# Patient Record
Sex: FEMALE | Race: WHITE | NOT HISPANIC OR LATINO | Employment: OTHER | URBAN - METROPOLITAN AREA
[De-identification: names, ages, dates, MRNs, and addresses within clinical notes are randomized per-mention and may not be internally consistent; named-entity substitution may affect disease eponyms.]

---

## 2017-01-19 ENCOUNTER — HOSPITAL ENCOUNTER (OUTPATIENT)
Dept: RADIOLOGY | Facility: MEDICAL CENTER | Age: 72
End: 2017-01-19
Attending: NURSE PRACTITIONER
Payer: MEDICARE

## 2017-01-19 DIAGNOSIS — E55.9 UNSPECIFIED VITAMIN D DEFICIENCY: ICD-10-CM

## 2017-01-19 DIAGNOSIS — Z13.820 SCREENING FOR OSTEOPOROSIS: ICD-10-CM

## 2017-01-19 PROCEDURE — 77080 DXA BONE DENSITY AXIAL: CPT

## 2017-02-06 ENCOUNTER — HOSPITAL ENCOUNTER (OUTPATIENT)
Dept: RADIOLOGY | Facility: MEDICAL CENTER | Age: 72
End: 2017-02-06
Attending: NURSE PRACTITIONER
Payer: MEDICARE

## 2017-02-06 DIAGNOSIS — R92.8 ABNORMAL MAMMOGRAM OF RIGHT BREAST: ICD-10-CM

## 2017-02-06 PROCEDURE — G0204 DX MAMMO INCL CAD BI: HCPCS

## 2017-08-16 ENCOUNTER — HOSPITAL ENCOUNTER (OUTPATIENT)
Dept: RADIOLOGY | Facility: MEDICAL CENTER | Age: 72
End: 2017-08-16
Attending: NURSE PRACTITIONER
Payer: MEDICARE

## 2017-08-16 DIAGNOSIS — E04.1 NONTOXIC UNINODULAR GOITER: ICD-10-CM

## 2017-08-16 PROCEDURE — 76536 US EXAM OF HEAD AND NECK: CPT

## 2017-08-29 ENCOUNTER — HOSPITAL ENCOUNTER (OUTPATIENT)
Dept: RADIOLOGY | Facility: MEDICAL CENTER | Age: 72
End: 2017-08-29
Attending: NURSE PRACTITIONER
Payer: MEDICARE

## 2017-08-29 DIAGNOSIS — E04.1 THYROID NODULE: ICD-10-CM

## 2017-08-29 PROCEDURE — 10022 IR-FINE NEEDLE ASPIR W/GUIDE(FL): CPT

## 2017-08-29 PROCEDURE — 88112 CYTOPATH CELL ENHANCE TECH: CPT

## 2017-08-29 ASSESSMENT — PAIN SCALES - GENERAL: PAINLEVEL_OUTOF10: 0

## 2017-08-29 NOTE — PROGRESS NOTES
"Patient given [facility] \"Preventing the Spread of Infection\" Brochure upon being checked in.     US guided Bilateral thyroid nodule fine needle aspiration done by Dr. Cunningham; bilateral anterior aspect of neck access site; procedural RN: Raenan; 2 jars of cytolyt obtained and sent to pathology lab; pt tolerated the procedure well; pt hemodynamically stable pre/intra/post procedure; all questions and concerns answered prior to being d/c; patient provided with appropriate education for procedure; pt d/c home.     "

## 2018-08-17 ENCOUNTER — HOSPITAL ENCOUNTER (OUTPATIENT)
Dept: RADIOLOGY | Facility: MEDICAL CENTER | Age: 73
End: 2018-08-17
Attending: FAMILY MEDICINE
Payer: MEDICARE

## 2018-08-17 DIAGNOSIS — E04.9 NON-TOXIC NODULAR GOITER: ICD-10-CM

## 2018-08-17 PROCEDURE — 76536 US EXAM OF HEAD AND NECK: CPT

## 2018-09-13 ENCOUNTER — HOSPITAL ENCOUNTER (OUTPATIENT)
Dept: RADIOLOGY | Facility: MEDICAL CENTER | Age: 73
End: 2018-09-13
Attending: FAMILY MEDICINE
Payer: MEDICARE

## 2018-09-13 DIAGNOSIS — Z12.31 BREAST CANCER SCREENING BY MAMMOGRAM: ICD-10-CM

## 2018-09-13 PROCEDURE — 77067 SCR MAMMO BI INCL CAD: CPT

## 2018-12-18 DIAGNOSIS — Z01.810 PRE-OPERATIVE CARDIOVASCULAR EXAMINATION: ICD-10-CM

## 2018-12-18 DIAGNOSIS — Z01.812 PRE-OPERATIVE LABORATORY EXAMINATION: ICD-10-CM

## 2018-12-18 LAB
ANION GAP SERPL CALC-SCNC: 7 MMOL/L (ref 0–11.9)
BUN SERPL-MCNC: 14 MG/DL (ref 8–22)
CALCIUM SERPL-MCNC: 10.2 MG/DL (ref 8.5–10.5)
CHLORIDE SERPL-SCNC: 107 MMOL/L (ref 96–112)
CO2 SERPL-SCNC: 26 MMOL/L (ref 20–33)
CREAT SERPL-MCNC: 0.82 MG/DL (ref 0.5–1.4)
EKG IMPRESSION: NORMAL
ERYTHROCYTE [DISTWIDTH] IN BLOOD BY AUTOMATED COUNT: 41.5 FL (ref 35.9–50)
GLUCOSE SERPL-MCNC: 108 MG/DL (ref 65–99)
HCT VFR BLD AUTO: 41.2 % (ref 37–47)
HGB BLD-MCNC: 13.5 G/DL (ref 12–16)
MCH RBC QN AUTO: 29.1 PG (ref 27–33)
MCHC RBC AUTO-ENTMCNC: 32.8 G/DL (ref 33.6–35)
MCV RBC AUTO: 88.8 FL (ref 81.4–97.8)
PLATELET # BLD AUTO: 289 K/UL (ref 164–446)
PMV BLD AUTO: 10.1 FL (ref 9–12.9)
POTASSIUM SERPL-SCNC: 4.2 MMOL/L (ref 3.6–5.5)
RBC # BLD AUTO: 4.64 M/UL (ref 4.2–5.4)
SODIUM SERPL-SCNC: 140 MMOL/L (ref 135–145)
WBC # BLD AUTO: 3.9 K/UL (ref 4.8–10.8)

## 2018-12-18 PROCEDURE — 93010 ELECTROCARDIOGRAM REPORT: CPT | Performed by: INTERNAL MEDICINE

## 2018-12-18 PROCEDURE — 93005 ELECTROCARDIOGRAM TRACING: CPT

## 2018-12-18 PROCEDURE — 85027 COMPLETE CBC AUTOMATED: CPT

## 2018-12-18 PROCEDURE — 36415 COLL VENOUS BLD VENIPUNCTURE: CPT

## 2018-12-18 PROCEDURE — 80048 BASIC METABOLIC PNL TOTAL CA: CPT

## 2018-12-18 RX ORDER — FAMOTIDINE 20 MG
TABLET ORAL DAILY
COMMUNITY

## 2018-12-18 RX ORDER — CHLORAL HYDRATE 500 MG
1000 CAPSULE ORAL DAILY
COMMUNITY

## 2018-12-18 RX ORDER — FENOFIBRATE 160 MG/1
160 TABLET ORAL DAILY
COMMUNITY

## 2018-12-18 RX ORDER — LANOLIN ALCOHOL/MO/W.PET/CERES
1000 CREAM (GRAM) TOPICAL DAILY
COMMUNITY

## 2018-12-18 RX ORDER — VIT C/B6/B5/MAGNESIUM/HERB 173 50-5-6-5MG
CAPSULE ORAL DAILY
COMMUNITY

## 2018-12-26 ENCOUNTER — HOSPITAL ENCOUNTER (OUTPATIENT)
Facility: MEDICAL CENTER | Age: 73
End: 2018-12-26
Attending: SURGERY | Admitting: SURGERY
Payer: MEDICARE

## 2018-12-26 VITALS
WEIGHT: 203.48 LBS | BODY MASS INDEX: 33.9 KG/M2 | HEIGHT: 65 IN | TEMPERATURE: 97.4 F | OXYGEN SATURATION: 95 % | RESPIRATION RATE: 16 BRPM | HEART RATE: 72 BPM

## 2018-12-26 DIAGNOSIS — G89.18 POSTOPERATIVE PAIN: ICD-10-CM

## 2018-12-26 LAB — PATHOLOGY CONSULT NOTE: NORMAL

## 2018-12-26 PROCEDURE — 700101 HCHG RX REV CODE 250

## 2018-12-26 PROCEDURE — 160002 HCHG RECOVERY MINUTES (STAT): Performed by: SURGERY

## 2018-12-26 PROCEDURE — A9270 NON-COVERED ITEM OR SERVICE: HCPCS | Performed by: ANESTHESIOLOGY

## 2018-12-26 PROCEDURE — 160036 HCHG PACU - EA ADDL 30 MINS PHASE I: Performed by: SURGERY

## 2018-12-26 PROCEDURE — 700111 HCHG RX REV CODE 636 W/ 250 OVERRIDE (IP)

## 2018-12-26 PROCEDURE — 160028 HCHG SURGERY MINUTES - 1ST 30 MINS LEVEL 3: Performed by: SURGERY

## 2018-12-26 PROCEDURE — 160035 HCHG PACU - 1ST 60 MINS PHASE I: Performed by: SURGERY

## 2018-12-26 PROCEDURE — 501838 HCHG SUTURE GENERAL: Performed by: SURGERY

## 2018-12-26 PROCEDURE — 700111 HCHG RX REV CODE 636 W/ 250 OVERRIDE (IP): Performed by: ANESTHESIOLOGY

## 2018-12-26 PROCEDURE — 502594 HCHG SCISSOR HANDLE, HARMONIC ACE: Performed by: SURGERY

## 2018-12-26 PROCEDURE — 160025 RECOVERY II MINUTES (STATS): Performed by: SURGERY

## 2018-12-26 PROCEDURE — 700102 HCHG RX REV CODE 250 W/ 637 OVERRIDE(OP): Performed by: ANESTHESIOLOGY

## 2018-12-26 PROCEDURE — 160039 HCHG SURGERY MINUTES - EA ADDL 1 MIN LEVEL 3: Performed by: SURGERY

## 2018-12-26 PROCEDURE — 160046 HCHG PACU - 1ST 60 MINS PHASE II: Performed by: SURGERY

## 2018-12-26 PROCEDURE — 160048 HCHG OR STATISTICAL LEVEL 1-5: Performed by: SURGERY

## 2018-12-26 PROCEDURE — 500002 HCHG ADHESIVE, DERMABOND: Performed by: SURGERY

## 2018-12-26 PROCEDURE — 160009 HCHG ANES TIME/MIN: Performed by: SURGERY

## 2018-12-26 PROCEDURE — 88307 TISSUE EXAM BY PATHOLOGIST: CPT

## 2018-12-26 RX ORDER — DIPHENHYDRAMINE HYDROCHLORIDE 50 MG/ML
12.5 INJECTION INTRAMUSCULAR; INTRAVENOUS
Status: DISCONTINUED | OUTPATIENT
Start: 2018-12-26 | End: 2018-12-26 | Stop reason: HOSPADM

## 2018-12-26 RX ORDER — OXYCODONE HCL 5 MG/5 ML
5 SOLUTION, ORAL ORAL
Status: COMPLETED | OUTPATIENT
Start: 2018-12-26 | End: 2018-12-26

## 2018-12-26 RX ORDER — ONDANSETRON 4 MG/1
4 TABLET, ORALLY DISINTEGRATING ORAL EVERY 6 HOURS PRN
Qty: 10 TAB | Refills: 0 | Status: SHIPPED | OUTPATIENT
Start: 2018-12-26

## 2018-12-26 RX ORDER — SODIUM CHLORIDE, SODIUM LACTATE, POTASSIUM CHLORIDE, CALCIUM CHLORIDE 600; 310; 30; 20 MG/100ML; MG/100ML; MG/100ML; MG/100ML
INJECTION, SOLUTION INTRAVENOUS ONCE
Status: COMPLETED | OUTPATIENT
Start: 2018-12-26 | End: 2018-12-26

## 2018-12-26 RX ORDER — HALOPERIDOL 5 MG/ML
1 INJECTION INTRAMUSCULAR
Status: DISCONTINUED | OUTPATIENT
Start: 2018-12-26 | End: 2018-12-26 | Stop reason: HOSPADM

## 2018-12-26 RX ORDER — LIDOCAINE HYDROCHLORIDE 40 MG/ML
SOLUTION TOPICAL
Status: DISCONTINUED
Start: 2018-12-26 | End: 2018-12-26 | Stop reason: HOSPADM

## 2018-12-26 RX ORDER — HYDROCODONE BITARTRATE AND ACETAMINOPHEN 5; 325 MG/1; MG/1
1-2 TABLET ORAL EVERY 6 HOURS PRN
Qty: 20 TAB | Refills: 0 | Status: SHIPPED | OUTPATIENT
Start: 2018-12-26 | End: 2018-12-31

## 2018-12-26 RX ORDER — ONDANSETRON 2 MG/ML
4 INJECTION INTRAMUSCULAR; INTRAVENOUS
Status: DISCONTINUED | OUTPATIENT
Start: 2018-12-26 | End: 2018-12-26 | Stop reason: HOSPADM

## 2018-12-26 RX ORDER — SODIUM CHLORIDE, SODIUM LACTATE, POTASSIUM CHLORIDE, CALCIUM CHLORIDE 600; 310; 30; 20 MG/100ML; MG/100ML; MG/100ML; MG/100ML
INJECTION, SOLUTION INTRAVENOUS CONTINUOUS
Status: DISCONTINUED | OUTPATIENT
Start: 2018-12-26 | End: 2018-12-26 | Stop reason: HOSPADM

## 2018-12-26 RX ORDER — OXYCODONE HCL 5 MG/5 ML
10 SOLUTION, ORAL ORAL
Status: COMPLETED | OUTPATIENT
Start: 2018-12-26 | End: 2018-12-26

## 2018-12-26 RX ORDER — MAGNESIUM SULFATE HEPTAHYDRATE 500 MG/ML
INJECTION, SOLUTION INTRAMUSCULAR; INTRAVENOUS
Status: DISCONTINUED
Start: 2018-12-26 | End: 2018-12-26 | Stop reason: HOSPADM

## 2018-12-26 RX ORDER — BUPIVACAINE HYDROCHLORIDE AND EPINEPHRINE 5; 5 MG/ML; UG/ML
INJECTION, SOLUTION PERINEURAL
Status: DISCONTINUED | OUTPATIENT
Start: 2018-12-26 | End: 2018-12-26 | Stop reason: HOSPADM

## 2018-12-26 RX ADMIN — SODIUM CHLORIDE, SODIUM LACTATE, POTASSIUM CHLORIDE, CALCIUM CHLORIDE: 600; 310; 30; 20 INJECTION, SOLUTION INTRAVENOUS at 08:51

## 2018-12-26 RX ADMIN — OXYCODONE HYDROCHLORIDE 10 MG: 5 SOLUTION ORAL at 11:57

## 2018-12-26 ASSESSMENT — PAIN SCALES - GENERAL
PAINLEVEL_OUTOF10: 4
PAINLEVEL_OUTOF10: 6
PAINLEVEL_OUTOF10: 4
PAINLEVEL_OUTOF10: 5
PAINLEVEL_OUTOF10: 0
PAINLEVEL_OUTOF10: 0

## 2018-12-26 NOTE — OP REPORT
Operative Report    Date: 12/26/2018    Surgeon: Yobani Otero M.D.    Assistant: HÉCTOR Valerio    Anesthesiologist: Dr. Mariee    Pre-operative Diagnosis: left thyroid nodule     Post-operative Diagnosis: same     Procedure:   1. Left thyroid lobectomy  2. unilateral recurrent laryngeal nerve monitoring    Indications: This is a 73 year old female who presented to my office with a 5.5 cm left thyroid nodule. She also had smaller nodules on the right side, for which she elected observation. I discussed risks of surgery including bleeding, infection, hypoparathyroidism, and recurrent laryngeal nerve injury, and she agreed to proceed.       Findings: 5.5 cm left thyroid nodule that appears grossly benign. The left recurrent laryngeal nerve was intact by NIM stimulation. The  left superior and left inferior parathyroid glands were seen and preserved.     Procedure in detail: The patient was identified in the pre-operative holding area and brought to the operating room. Correct side and site were identified.  GETA was smoothly induced. The patient was prepped and draped in the usual sterile fashion.    With the patient in the supine position, the head of the bed slightly elevated, the neck slightly extended, and the patient intubated with a NIM endotracheal tube, the precordial electrodes were placed by the surgical assistant, who then monitored the recurrent laryngeal nerves bilaterally throughout the procedure.     The neck was prepared with betadiene and draped in the usual fashion. The neck was entered through a lower transverse cervical incision and carried down through the platysma. Subplatysmal flaps were dissected superiorly and inferiorly down to the sternal notch. The midline cervical fascia was incised down to the capsule of the thyroid. The strap muscles were mobilized off the left thyroid lobe, and the superior pole vessels progressively divided with the Ligasure. The thyroid veins were divided with  the Ligasure. The recurrent laryngeal nerve and both parathyroids were identified and protected. Branches of the inferior thyroid artery were divided on the capsule of the gland with the Ligasure. Smaller vessels were either divided with the Ligasure or, when near to the recurrent nerve, divided between clamps and suture ligated with 3-0 Vicryl suture as the gland was dissected free from the nerve and off the trachea. The gland was transected at the medial border of the right thyroid lobe and the left thyroid lobe and the isthmus were sent for permanent pathologic exam.     Good hemostasis was assured.     The integrity of the left recurrent laryngeal nerve was documented. Small pieces of Surgicel Fibrillar were placed in  the neck. The midline cervical fascia was reapproximated with running 3-0 Vicryl. Platysma was reapproximated with interrupted 3-0 Vicryl. The skin was closed with a running subcuticular Prolene, and dressed with Dermabond. The Prolene was removed when the Dermabond dried.     The patient was awakened from general anesthetic, and was taken to the recovery room in stable condition.    Sponge and needle counts were correct at the end of the case.     Specimen: left thyroid lobe and isthmus    EBL: 30 cc    Dispo: to PACU    Yobani Otero M.D.  [facility]  [phone]

## 2018-12-26 NOTE — DISCHARGE INSTRUCTIONS
ACTIVITY: Rest and take it easy for the first 24 hours.  A responsible adult is recommended to remain with you during that time.  It is normal to feel sleepy.  We encourage you to not do anything that requires balance, judgment or coordination.    MILD FLU-LIKE SYMPTOMS ARE NORMAL. YOU MAY EXPERIENCE GENERALIZED MUSCLE ACHES, THROAT IRRITATION, HEADACHE AND/OR SOME NAUSEA.    FOR 24 HOURS DO NOT:  Drive, operate machinery or run household appliances.  Drink beer or alcoholic beverages.   Make important decisions or sign legal documents.    SPECIAL INSTRUCTIONS: *Discharge instructions after thyroidectomy:     You had surgery called thyroidectomy. This means that part or all of your thyroid gland was removed. The main job of the thyroid gland is to make thyroid hormone. This hormone controls your body’s metabolism. This is the way your body creates and uses energy. Removing the thyroid gland removes your body’s source of thyroid hormone. So after the surgery, you will need to take thyroid hormone pills daily. This helps keep the level of thyroid hormone in your body steady. This sheet tells you more about how to care for yourself after surgery.     Recovering After Surgery:     Get plenty of rest.     Care for your incision as directed.     Avoid heavy lifting and strenuous activity for 2 weeks.     Walk a few times daily. But don’t push yourself too hard. Slowly increase your pace and distance, as you feel able.     Return to work when your doctor says it’s okay.     Pain Control:  Most patients do well after thyroid surgery. You may take ibuprofen or Tylenol as needed for pain. If you need stronger pain medication, call your doctor's office.         Keeping Your Doctor’s Appointments:     See your doctor for regular visits. These are needed to monitor your health.     Have routine blood tests done. These check the level of thyroid hormone in your body. This helps your doctor know whether to adjust the dosage of  your medication if needed.     Tell your doctor about any signs of further thyroid problems.     Signs that you may have too much thyroid hormone in your body include:                 Restlessness                 Rapid weight loss                 Sweating                 Signs that you may have too little thyroid hormone in your body include:                 Fatigue or sluggishness                 Puffy hands, face, or feet                 Hoarseness                 Muscle pain                 Slow pulse (less than 60 beats per minute)     When to Call Your Doctor:  Call your doctor right away if you have any of the following:  Fever above 101°F  Swelling or bleeding at the incision site  Choking  Trouble breathing  A sore throat that lasts longer than 7 days  Tingling or cramps in your hands, feet, or lips     Calcium Supplementation  After thyroidectomy, you may have low calcium levels. Symptoms of low calcium levels include tingling or numbness in the fingers or lips, or muscle spasms.  To prevent low calcium levels, you should take Citracal Max. The usual dosage is 2 tablets three times daily. You may buy this OTC. If you have symptoms of low calcium, take two extra tablets and call your doctor's office.      Your doctor may also prescribe Rocaltrol to help with your calcium levels. Take this as prescribed.     FOLLOW-UP:  · Please call my office at [phone] to make an appointment in 1 week     Office address:  [place] [address]     Yobani Otero M.D.  [facility]  [phone]**    DIET: To avoid nausea, slowly advance diet as tolerated, avoiding spicy or greasy foods for the first day.  Add more substantial food to your diet according to your physician's instructions.  Babies can be fed formula or breast milk as soon as they are hungry.  INCREASE FLUIDS AND FIBER TO AVOID CONSTIPATION.    SURGICAL DRESSING/BATHING: *shower ok no hot tubs tub baths or swimming until after follow up  appointment and ok per Dr Otero **    FOLLOW-UP APPOINTMENT:  A follow-up appointment should be arranged with your doctor in ***; call to schedule. Follow up as already scheduled     You should CALL YOUR PHYSICIAN if you develop:  Fever greater than 101 degrees F.  Pain not relieved by medication, or persistent nausea or vomiting.  Excessive bleeding (blood soaking through dressing) or unexpected drainage from the wound.  Extreme redness or swelling around the incision site, drainage of pus or foul smelling drainage.  Inability to urinate or empty your bladder within 8 hours.  Problems with breathing or chest pain.    You should call 911 if you develop problems with breathing or chest pain.  If you are unable to contact your doctor or surgical center, you should go to the nearest emergency room or urgent care center.  Physician's telephone #: *[phone]**    If any questions arise, call your doctor.  If your doctor is not available, please feel free to call the Surgical Center at [phone].  The Center is open Monday through Friday from 7AM to 7PM.  You can also call the [employer] HOTLINE open 24 hours/day, 7 days/week and speak to a nurse at [phone], or toll free at [phone].    A registered nurse may call you a few days after your surgery to see how you are doing after your procedure.    MEDICATIONS: Resume taking daily medication.  Take prescribed pain medication with food.  If no medication is prescribed, you may take non-aspirin pain medication if needed.  PAIN MEDICATION CAN BE VERY CONSTIPATING.  Take a stool softener or laxative such as senokot, pericolace, or milk of magnesia if needed.    Prescription given for *norco **.  Last pain medication given at *1200pm **.    If your physician has prescribed pain medication that includes Acetaminophen (Tylenol), do not take additional Acetaminophen (Tylenol) while taking the prescribed medication.    Depression / Suicide Risk    As you are  discharged from this [facility] facility, it is important to learn how to keep safe from harming yourself.    Recognize the warning signs:  · Abrupt changes in personality, positive or negative- including increase in energy   · Giving away possessions  · Change in eating patterns- significant weight changes-  positive or negative  · Change in sleeping patterns- unable to sleep or sleeping all the time   · Unwillingness or inability to communicate  · Depression  · Unusual sadness, discouragement and loneliness  · Talk of wanting to die  · Neglect of personal appearance   · Rebelliousness- reckless behavior  · Withdrawal from people/activities they love  · Confusion- inability to concentrate     If you or a loved one observes any of these behaviors or has concerns about self-harm, here's what you can do:  · Talk about it- your feelings and reasons for harming yourself  · Remove any means that you might use to hurt yourself (examples: pills, rope, extension cords, firearm)  · Get professional help from the community (Mental Health, Substance Abuse, psychological counseling)  · Do not be alone:Call your Safe Contact- someone whom you trust who will be there for you.  · Call your local CRISIS HOTLINE 527-8420 or 741-969-7770  · Call your local Children's Mobile Crisis Response Team [place] (187) 435-7893 or www.[employer]  · Call the toll free National Suicide Prevention Hotlines   · National Suicide Prevention Lifeline 027-407-GLVT (4469)  · National Hope Line Network 800-SUICIDE (596-7079)

## 2018-12-26 NOTE — DISCHARGE SUMMARY
Discharge instructions after thyroidectomy:    You had surgery called thyroidectomy. This means that part or all of your thyroid gland was removed. The main job of the thyroid gland is to make thyroid hormone. This hormone controls your body’s metabolism. This is the way your body creates and uses energy. Removing the thyroid gland removes your body’s source of thyroid hormone. So after the surgery, you will need to take thyroid hormone pills daily. This helps keep the level of thyroid hormone in your body steady. This sheet tells you more about how to care for yourself after surgery.    Recovering After Surgery:    Get plenty of rest.    Care for your incision as directed.    Avoid heavy lifting and strenuous activity for 2 weeks.    Walk a few times daily. But don’t push yourself too hard. Slowly increase your pace and distance, as you feel able.    Return to work when your doctor says it’s okay.    Pain Control:  Most patients do well after thyroid surgery. You may take ibuprofen or Tylenol as needed for pain. If you need stronger pain medication, call your doctor's office.       Keeping Your Doctor’s Appointments:    See your doctor for regular visits. These are needed to monitor your health.    Have routine blood tests done. These check the level of thyroid hormone in your body. This helps your doctor know whether to adjust the dosage of your medication if needed.    Tell your doctor about any signs of further thyroid problems.    Signs that you may have too much thyroid hormone in your body include:     Restlessness     Rapid weight loss     Sweating     Signs that you may have too little thyroid hormone in your body include:     Fatigue or sluggishness     Puffy hands, face, or feet     Hoarseness     Muscle pain     Slow pulse (less than 60 beats per minute)    When to Call Your Doctor:  Call your doctor right away if you have any of the following:  Fever above 101°F  Swelling or bleeding at the incision  site  Choking  Trouble breathing  A sore throat that lasts longer than 7 days  Tingling or cramps in your hands, feet, or lips    Calcium Supplementation  After thyroidectomy, you may have low calcium levels. Symptoms of low calcium levels include tingling or numbness in the fingers or lips, or muscle spasms.  To prevent low calcium levels, you should take Citracal Max. The usual dosage is 2 tablets three times daily. You may buy this OTC. If you have symptoms of low calcium, take two extra tablets and call your doctor's office.     Your doctor may also prescribe Rocaltrol to help with your calcium levels. Take this as prescribed.    FOLLOW-UP:  ? Please call my office at [phone] to make an appointment in 1 week    Office address:  [address] [place] [address]    Yobani Otero M.D.  [facility]  [phone]

## 2018-12-26 NOTE — OR NURSING
1130 to pacu report from dr diggs and Or Rn pt remains asleep with oral airway in place   1145 awake oral airway removed breathing even unlabored o2 decreased to 2 liters sats 95% site to anterior neck inc closed with dermabond well approx clean and dry no bleeding and without redness or swelling   cold pack to site   1200- medicated with oral and iv pain meds rating pain 5-6 on 10 scale   1230son at bedside   1245- still needing 02 at 2 liter sats 87% on roomair   1350- meets dc criteria   Reviewed all dc instructions with pt and pt son   1415 dc to home declines wheelchair ambulates out of unit with steady gait with all belongings accomp with son

## 2019-03-05 ENCOUNTER — APPOINTMENT (RX ONLY)
Dept: URBAN - METROPOLITAN AREA CLINIC 20 | Facility: CLINIC | Age: 74
Setting detail: DERMATOLOGY
End: 2019-03-05

## 2019-03-05 DIAGNOSIS — D18.0 HEMANGIOMA: ICD-10-CM

## 2019-03-05 DIAGNOSIS — L82.1 OTHER SEBORRHEIC KERATOSIS: ICD-10-CM

## 2019-03-05 DIAGNOSIS — Z71.89 OTHER SPECIFIED COUNSELING: ICD-10-CM

## 2019-03-05 DIAGNOSIS — L81.4 OTHER MELANIN HYPERPIGMENTATION: ICD-10-CM

## 2019-03-05 DIAGNOSIS — L82.0 INFLAMED SEBORRHEIC KERATOSIS: ICD-10-CM

## 2019-03-05 DIAGNOSIS — D22 MELANOCYTIC NEVI: ICD-10-CM

## 2019-03-05 PROBLEM — E05.90 THYROTOXICOSIS, UNSPECIFIED WITHOUT THYROTOXIC CRISIS OR STORM: Status: ACTIVE | Noted: 2019-03-05

## 2019-03-05 PROBLEM — D22.5 MELANOCYTIC NEVI OF TRUNK: Status: ACTIVE | Noted: 2019-03-05

## 2019-03-05 PROBLEM — D18.01 HEMANGIOMA OF SKIN AND SUBCUTANEOUS TISSUE: Status: ACTIVE | Noted: 2019-03-05

## 2019-03-05 PROBLEM — D22.62 MELANOCYTIC NEVI OF LEFT UPPER LIMB, INCLUDING SHOULDER: Status: ACTIVE | Noted: 2019-03-05

## 2019-03-05 PROBLEM — D48.5 NEOPLASM OF UNCERTAIN BEHAVIOR OF SKIN: Status: ACTIVE | Noted: 2019-03-05

## 2019-03-05 PROBLEM — D22.61 MELANOCYTIC NEVI OF RIGHT UPPER LIMB, INCLUDING SHOULDER: Status: ACTIVE | Noted: 2019-03-05

## 2019-03-05 PROCEDURE — 99203 OFFICE O/P NEW LOW 30 MIN: CPT | Mod: 25

## 2019-03-05 PROCEDURE — 11103 TANGNTL BX SKIN EA SEP/ADDL: CPT

## 2019-03-05 PROCEDURE — 11102 TANGNTL BX SKIN SINGLE LES: CPT

## 2019-03-05 PROCEDURE — ? BIOPSY BY SHAVE METHOD

## 2019-03-05 PROCEDURE — ? SUNSCREEN RECOMMENDATIONS

## 2019-03-05 PROCEDURE — ? COUNSELING

## 2019-03-05 ASSESSMENT — LOCATION ZONE DERM
LOCATION ZONE: HAND
LOCATION ZONE: ARM
LOCATION ZONE: FACE
LOCATION ZONE: TRUNK

## 2019-03-05 ASSESSMENT — LOCATION DETAILED DESCRIPTION DERM
LOCATION DETAILED: INFERIOR THORACIC SPINE
LOCATION DETAILED: RIGHT INFERIOR UPPER BACK
LOCATION DETAILED: LEFT RADIAL DORSAL HAND
LOCATION DETAILED: LEFT VENTRAL PROXIMAL FOREARM
LOCATION DETAILED: RIGHT MEDIAL UPPER BACK
LOCATION DETAILED: SUPERIOR THORACIC SPINE
LOCATION DETAILED: RIGHT INFERIOR MEDIAL FOREHEAD
LOCATION DETAILED: RIGHT VENTRAL DISTAL FOREARM
LOCATION DETAILED: RIGHT RADIAL DORSAL HAND
LOCATION DETAILED: LEFT PROXIMAL DORSAL FOREARM
LOCATION DETAILED: RIGHT PROXIMAL DORSAL FOREARM

## 2019-03-05 ASSESSMENT — LOCATION SIMPLE DESCRIPTION DERM
LOCATION SIMPLE: RIGHT HAND
LOCATION SIMPLE: RIGHT UPPER BACK
LOCATION SIMPLE: RIGHT FOREHEAD
LOCATION SIMPLE: LEFT FOREARM
LOCATION SIMPLE: LEFT HAND
LOCATION SIMPLE: RIGHT FOREARM
LOCATION SIMPLE: UPPER BACK

## 2019-03-05 NOTE — PROCEDURE: BIOPSY BY SHAVE METHOD
Detail Level: Detailed
Lab: [id number]
Consent: Written consent was obtained and risks were reviewed including but not limited to scarring, infection, bleeding, scabbing, incomplete removal, nerve damage and allergy to anesthesia.
Bill For Surgical Tray: no
Biopsy Method: Personna blade
Hemostasis: Drysol and Electrocautery
Additional Anesthesia Volume In Cc (Will Not Render If 0): 0
Wound Care: Bacitracin
Lab Facility: 
Render Post-Care Instructions In Note?: yes
Anesthesia Type: 1% lidocaine with 1:100,000 epinephrine and a 1:12 solution of 8.4% sodium bicarbonate
Billing Type: Third-Party Bill
Type Of Destruction Used: Curettage
Biopsy Type: H and E
Depth Of Biopsy: dermis
Notification Instructions: Patient will be notified of biopsy results. However, patient instructed to call the office if not contacted within 2 weeks.
Dressing: Band-Aid
Anesthesia Volume In Cc: 1
Post-Care Instructions: I reviewed with the patient in detail post-care instructions. Patient is to keep the biopsy site clean once daily and then apply petroleum and bandaid  until healed.

## 2019-03-05 NOTE — HPI: SKIN LESION
Is This A New Presentation, Or A Follow-Up?: Growth
What Type Of Note Output Would You Prefer (Optional)?: Standard Output
How Severe Is Your Skin Lesion?: moderate
Has Your Skin Lesion Been Treated?: not been treated
Which Family Member (Optional)?: Mother

## 2020-07-29 ENCOUNTER — APPOINTMENT (RX ONLY)
Dept: URBAN - METROPOLITAN AREA CLINIC 20 | Facility: CLINIC | Age: 75
Setting detail: DERMATOLOGY
End: 2020-07-29

## 2020-07-29 DIAGNOSIS — I83.9 ASYMPTOMATIC VARICOSE VEINS OF LOWER EXTREMITIES: ICD-10-CM

## 2020-07-29 DIAGNOSIS — L82.0 INFLAMED SEBORRHEIC KERATOSIS: ICD-10-CM

## 2020-07-29 DIAGNOSIS — D22 MELANOCYTIC NEVI: ICD-10-CM

## 2020-07-29 DIAGNOSIS — D18.0 HEMANGIOMA: ICD-10-CM

## 2020-07-29 DIAGNOSIS — Z71.89 OTHER SPECIFIED COUNSELING: ICD-10-CM

## 2020-07-29 DIAGNOSIS — L82.1 OTHER SEBORRHEIC KERATOSIS: ICD-10-CM

## 2020-07-29 DIAGNOSIS — L81.4 OTHER MELANIN HYPERPIGMENTATION: ICD-10-CM

## 2020-07-29 PROBLEM — D22.61 MELANOCYTIC NEVI OF RIGHT UPPER LIMB, INCLUDING SHOULDER: Status: ACTIVE | Noted: 2020-07-29

## 2020-07-29 PROBLEM — D18.01 HEMANGIOMA OF SKIN AND SUBCUTANEOUS TISSUE: Status: ACTIVE | Noted: 2020-07-29

## 2020-07-29 PROBLEM — D22.5 MELANOCYTIC NEVI OF TRUNK: Status: ACTIVE | Noted: 2020-07-29

## 2020-07-29 PROBLEM — I83.91 ASYMPTOMATIC VARICOSE VEINS OF RIGHT LOWER EXTREMITY: Status: ACTIVE | Noted: 2020-07-29

## 2020-07-29 PROBLEM — D22.62 MELANOCYTIC NEVI OF LEFT UPPER LIMB, INCLUDING SHOULDER: Status: ACTIVE | Noted: 2020-07-29

## 2020-07-29 PROCEDURE — ? COUNSELING

## 2020-07-29 PROCEDURE — ? SUNSCREEN RECOMMENDATIONS

## 2020-07-29 PROCEDURE — ? ADDITIONAL NOTES

## 2020-07-29 PROCEDURE — 99214 OFFICE O/P EST MOD 30 MIN: CPT

## 2020-07-29 ASSESSMENT — LOCATION DETAILED DESCRIPTION DERM
LOCATION DETAILED: RIGHT PROXIMAL DORSAL FOREARM
LOCATION DETAILED: LEFT PROXIMAL DORSAL FOREARM
LOCATION DETAILED: RIGHT CENTRAL MALAR CHEEK
LOCATION DETAILED: RIGHT RADIAL DORSAL HAND
LOCATION DETAILED: RIGHT INFERIOR MEDIAL FOREHEAD
LOCATION DETAILED: LEFT VENTRAL PROXIMAL FOREARM
LOCATION DETAILED: INFERIOR THORACIC SPINE
LOCATION DETAILED: SUPERIOR THORACIC SPINE
LOCATION DETAILED: LEFT INFERIOR CENTRAL MALAR CHEEK
LOCATION DETAILED: RIGHT MEDIAL UPPER BACK
LOCATION DETAILED: RIGHT VENTRAL DISTAL FOREARM
LOCATION DETAILED: RIGHT UPPER CUTANEOUS LIP
LOCATION DETAILED: RIGHT INFERIOR UPPER BACK
LOCATION DETAILED: LEFT RADIAL DORSAL HAND

## 2020-07-29 ASSESSMENT — LOCATION SIMPLE DESCRIPTION DERM
LOCATION SIMPLE: UPPER BACK
LOCATION SIMPLE: RIGHT UPPER BACK
LOCATION SIMPLE: RIGHT FOREARM
LOCATION SIMPLE: LEFT HAND
LOCATION SIMPLE: LEFT FOREARM
LOCATION SIMPLE: RIGHT FOREHEAD
LOCATION SIMPLE: RIGHT LIP
LOCATION SIMPLE: RIGHT CHEEK
LOCATION SIMPLE: RIGHT HAND
LOCATION SIMPLE: LEFT CHEEK

## 2020-07-29 ASSESSMENT — LOCATION ZONE DERM
LOCATION ZONE: LIP
LOCATION ZONE: ARM
LOCATION ZONE: HAND
LOCATION ZONE: FACE
LOCATION ZONE: TRUNK

## 2020-07-29 NOTE — PROCEDURE: COUNSELING
Detail Level: Detailed
Patient Specific Counseling (Will Not Stick From Patient To Patient): pt reports she had an excision in the spot many years ago and the dilated vessel has remained. Wants cosmetic treatment.
Detail Level: Zone

## 2020-07-29 NOTE — PROCEDURE: ADDITIONAL NOTES
Additional Notes: Provided patient with Hanane’s card to schedule cosmetic consultation for scar treatment
Detail Level: Detailed

## 2021-01-14 DIAGNOSIS — Z23 NEED FOR VACCINATION: ICD-10-CM

## 2021-07-15 ENCOUNTER — HOSPITAL ENCOUNTER (OUTPATIENT)
Dept: RADIOLOGY | Facility: MEDICAL CENTER | Age: 76
End: 2021-07-15
Attending: FAMILY MEDICINE
Payer: MEDICARE

## 2021-07-15 DIAGNOSIS — Z12.31 ENCOUNTER FOR MAMMOGRAM TO ESTABLISH BASELINE MAMMOGRAM: ICD-10-CM

## 2021-07-15 PROCEDURE — 77063 BREAST TOMOSYNTHESIS BI: CPT

## 2021-07-26 ENCOUNTER — APPOINTMENT (RX ONLY)
Dept: URBAN - METROPOLITAN AREA CLINIC 20 | Facility: CLINIC | Age: 76
Setting detail: DERMATOLOGY
End: 2021-07-26

## 2021-07-26 DIAGNOSIS — L82.1 OTHER SEBORRHEIC KERATOSIS: ICD-10-CM

## 2021-07-26 DIAGNOSIS — D22 MELANOCYTIC NEVI: ICD-10-CM

## 2021-07-26 DIAGNOSIS — D18.0 HEMANGIOMA: ICD-10-CM

## 2021-07-26 DIAGNOSIS — Z71.89 OTHER SPECIFIED COUNSELING: ICD-10-CM

## 2021-07-26 DIAGNOSIS — L81.4 OTHER MELANIN HYPERPIGMENTATION: ICD-10-CM

## 2021-07-26 PROBLEM — D22.62 MELANOCYTIC NEVI OF LEFT UPPER LIMB, INCLUDING SHOULDER: Status: ACTIVE | Noted: 2021-07-26

## 2021-07-26 PROBLEM — D22.72 MELANOCYTIC NEVI OF LEFT LOWER LIMB, INCLUDING HIP: Status: ACTIVE | Noted: 2021-07-26

## 2021-07-26 PROBLEM — D22.61 MELANOCYTIC NEVI OF RIGHT UPPER LIMB, INCLUDING SHOULDER: Status: ACTIVE | Noted: 2021-07-26

## 2021-07-26 PROBLEM — D22.39 MELANOCYTIC NEVI OF OTHER PARTS OF FACE: Status: ACTIVE | Noted: 2021-07-26

## 2021-07-26 PROBLEM — D22.5 MELANOCYTIC NEVI OF TRUNK: Status: ACTIVE | Noted: 2021-07-26

## 2021-07-26 PROBLEM — D22.71 MELANOCYTIC NEVI OF RIGHT LOWER LIMB, INCLUDING HIP: Status: ACTIVE | Noted: 2021-07-26

## 2021-07-26 PROBLEM — D18.01 HEMANGIOMA OF SKIN AND SUBCUTANEOUS TISSUE: Status: ACTIVE | Noted: 2021-07-26

## 2021-07-26 PROCEDURE — ? COUNSELING

## 2021-07-26 PROCEDURE — ? SUNSCREEN RECOMMENDATIONS

## 2021-07-26 PROCEDURE — 99213 OFFICE O/P EST LOW 20 MIN: CPT

## 2021-07-26 ASSESSMENT — LOCATION DETAILED DESCRIPTION DERM
LOCATION DETAILED: LEFT VENTRAL PROXIMAL FOREARM
LOCATION DETAILED: RIGHT SUPERIOR UPPER BACK
LOCATION DETAILED: RIGHT DISTAL POSTERIOR THIGH
LOCATION DETAILED: RIGHT INFERIOR CENTRAL MALAR CHEEK
LOCATION DETAILED: LEFT PROXIMAL POSTERIOR UPPER ARM
LOCATION DETAILED: RIGHT DISTAL POSTERIOR UPPER ARM
LOCATION DETAILED: RIGHT INFERIOR FOREHEAD
LOCATION DETAILED: RIGHT INFERIOR MEDIAL MIDBACK
LOCATION DETAILED: INFERIOR THORACIC SPINE
LOCATION DETAILED: LEFT LATERAL PROXIMAL PRETIBIAL REGION
LOCATION DETAILED: RIGHT VENTRAL PROXIMAL FOREARM
LOCATION DETAILED: RIGHT PROXIMAL PRETIBIAL REGION
LOCATION DETAILED: RIGHT INFERIOR MEDIAL UPPER BACK
LOCATION DETAILED: LEFT DISTAL POSTERIOR THIGH

## 2021-07-26 ASSESSMENT — LOCATION ZONE DERM
LOCATION ZONE: TRUNK
LOCATION ZONE: FACE
LOCATION ZONE: ARM
LOCATION ZONE: LEG

## 2021-07-26 ASSESSMENT — LOCATION SIMPLE DESCRIPTION DERM
LOCATION SIMPLE: RIGHT POSTERIOR UPPER ARM
LOCATION SIMPLE: LEFT POSTERIOR THIGH
LOCATION SIMPLE: LEFT PRETIBIAL REGION
LOCATION SIMPLE: RIGHT FOREARM
LOCATION SIMPLE: RIGHT PRETIBIAL REGION
LOCATION SIMPLE: RIGHT POSTERIOR THIGH
LOCATION SIMPLE: RIGHT FOREHEAD
LOCATION SIMPLE: RIGHT CHEEK
LOCATION SIMPLE: RIGHT UPPER BACK
LOCATION SIMPLE: RIGHT LOWER BACK
LOCATION SIMPLE: LEFT POSTERIOR UPPER ARM
LOCATION SIMPLE: LEFT FOREARM
LOCATION SIMPLE: UPPER BACK

## 2022-08-25 ENCOUNTER — HOSPITAL ENCOUNTER (OUTPATIENT)
Dept: RADIOLOGY | Facility: MEDICAL CENTER | Age: 77
End: 2022-08-25
Attending: FAMILY MEDICINE
Payer: MEDICARE

## 2022-08-25 DIAGNOSIS — Z12.31 VISIT FOR SCREENING MAMMOGRAM: ICD-10-CM

## 2022-08-25 PROCEDURE — 77063 BREAST TOMOSYNTHESIS BI: CPT

## 2022-11-07 ENCOUNTER — PATIENT MESSAGE (OUTPATIENT)
Dept: HEALTH INFORMATION MANAGEMENT | Facility: OTHER | Age: 77
End: 2022-11-07

## 2024-01-17 ENCOUNTER — HOSPITAL ENCOUNTER (OUTPATIENT)
Dept: RADIOLOGY | Facility: MEDICAL CENTER | Age: 79
End: 2024-01-17
Attending: FAMILY MEDICINE
Payer: MEDICARE

## 2024-01-17 DIAGNOSIS — M25.532 LEFT WRIST PAIN: ICD-10-CM

## 2024-01-17 PROCEDURE — 73130 X-RAY EXAM OF HAND: CPT | Mod: LT

## 2024-01-17 PROCEDURE — 73110 X-RAY EXAM OF WRIST: CPT | Mod: LT

## 2024-10-28 ENCOUNTER — HOSPITAL ENCOUNTER (OUTPATIENT)
Dept: RADIOLOGY | Facility: MEDICAL CENTER | Age: 79
End: 2024-10-28
Attending: FAMILY MEDICINE
Payer: MEDICARE

## 2024-10-28 DIAGNOSIS — Z12.31 VISIT FOR SCREENING MAMMOGRAM: ICD-10-CM

## 2024-10-28 PROCEDURE — 77067 SCR MAMMO BI INCL CAD: CPT

## 2025-06-23 ENCOUNTER — HOSPITAL ENCOUNTER (OUTPATIENT)
Dept: RADIOLOGY | Facility: MEDICAL CENTER | Age: 80
End: 2025-06-23
Attending: FAMILY MEDICINE
Payer: MEDICARE

## 2025-06-23 DIAGNOSIS — R05.2 SUBACUTE COUGH: ICD-10-CM

## 2025-06-23 PROCEDURE — 71046 X-RAY EXAM CHEST 2 VIEWS: CPT

## 2025-06-25 ENCOUNTER — APPOINTMENT (OUTPATIENT)
Dept: RADIOLOGY | Facility: MEDICAL CENTER | Age: 80
DRG: 374 | End: 2025-06-25
Attending: EMERGENCY MEDICINE
Payer: MEDICARE

## 2025-06-25 ENCOUNTER — HOSPITAL ENCOUNTER (INPATIENT)
Facility: MEDICAL CENTER | Age: 80
LOS: 5 days | End: 2025-06-30
Attending: EMERGENCY MEDICINE | Admitting: STUDENT IN AN ORGANIZED HEALTH CARE EDUCATION/TRAINING PROGRAM
Payer: MEDICARE

## 2025-06-25 DIAGNOSIS — R19.09 OTHER INTRA-ABDOMINAL AND PELVIC SWELLING, MASS AND LUMP: ICD-10-CM

## 2025-06-25 DIAGNOSIS — J91.0 MALIGNANT PLEURAL EFFUSION: ICD-10-CM

## 2025-06-25 DIAGNOSIS — R97.1 ELEVATED CANCER ANTIGEN 125 (CA 125): ICD-10-CM

## 2025-06-25 DIAGNOSIS — Z71.89 ACP (ADVANCE CARE PLANNING): ICD-10-CM

## 2025-06-25 DIAGNOSIS — J90 PLEURAL EFFUSION: ICD-10-CM

## 2025-06-25 DIAGNOSIS — I10 PRIMARY HYPERTENSION: ICD-10-CM

## 2025-06-25 DIAGNOSIS — J96.01 ACUTE RESPIRATORY FAILURE WITH HYPOXIA (HCC): Primary | ICD-10-CM

## 2025-06-25 DIAGNOSIS — R18.0 MALIGNANT ASCITES: ICD-10-CM

## 2025-06-25 DIAGNOSIS — C78.6 PERITONEAL CARCINOMATOSIS (HCC): ICD-10-CM

## 2025-06-25 PROBLEM — J18.9 PNEUMONIA DUE TO INFECTIOUS ORGANISM: Status: ACTIVE | Noted: 2025-06-25

## 2025-06-25 PROBLEM — G89.3 CANCER ASSOCIATED PAIN: Status: ACTIVE | Noted: 2025-06-25

## 2025-06-25 PROBLEM — I25.10 CAD (CORONARY ARTERY DISEASE): Status: ACTIVE | Noted: 2025-06-25

## 2025-06-25 PROBLEM — R60.0 BILATERAL LEG EDEMA: Status: ACTIVE | Noted: 2025-06-25

## 2025-06-25 LAB
ALBUMIN SERPL BCP-MCNC: 3.3 G/DL (ref 3.2–4.9)
ALBUMIN/GLOB SERPL: 0.9 G/DL
ALP SERPL-CCNC: 128 U/L (ref 30–99)
ALT SERPL-CCNC: 32 U/L (ref 2–50)
ANION GAP SERPL CALC-SCNC: 15 MMOL/L (ref 7–16)
APPEARANCE UR: CLEAR
AST SERPL-CCNC: 46 U/L (ref 12–45)
BASOPHILS # BLD AUTO: 0.4 % (ref 0–1.8)
BASOPHILS # BLD: 0.04 K/UL (ref 0–0.12)
BILIRUB SERPL-MCNC: 0.3 MG/DL (ref 0.1–1.5)
BILIRUB UR QL STRIP.AUTO: NEGATIVE
BUN SERPL-MCNC: 8 MG/DL (ref 8–22)
CALCIUM ALBUM COR SERPL-MCNC: 9.6 MG/DL (ref 8.5–10.5)
CALCIUM SERPL-MCNC: 9 MG/DL (ref 8.5–10.5)
CHLORIDE SERPL-SCNC: 97 MMOL/L (ref 96–112)
CO2 SERPL-SCNC: 21 MMOL/L (ref 20–33)
COLOR UR: YELLOW
CREAT SERPL-MCNC: 0.48 MG/DL (ref 0.5–1.4)
CRP SERPL HS-MCNC: 17.2 MG/DL (ref 0–0.75)
EKG IMPRESSION: NORMAL
EOSINOPHIL # BLD AUTO: 0.03 K/UL (ref 0–0.51)
EOSINOPHIL NFR BLD: 0.3 % (ref 0–6.9)
ERYTHROCYTE [DISTWIDTH] IN BLOOD BY AUTOMATED COUNT: 43.1 FL (ref 35.9–50)
ERYTHROCYTE [SEDIMENTATION RATE] IN BLOOD BY WESTERGREN METHOD: 45 MM/HOUR (ref 0–25)
FLUAV RNA SPEC QL NAA+PROBE: NEGATIVE
FLUBV RNA SPEC QL NAA+PROBE: NEGATIVE
GFR SERPLBLD CREATININE-BSD FMLA CKD-EPI: 96 ML/MIN/1.73 M 2
GLOBULIN SER CALC-MCNC: 3.5 G/DL (ref 1.9–3.5)
GLUCOSE SERPL-MCNC: 130 MG/DL (ref 65–99)
GLUCOSE UR STRIP.AUTO-MCNC: NEGATIVE MG/DL
HCT VFR BLD AUTO: 38.4 % (ref 37–47)
HGB BLD-MCNC: 12.4 G/DL (ref 12–16)
IMM GRANULOCYTES # BLD AUTO: 0.04 K/UL (ref 0–0.11)
IMM GRANULOCYTES NFR BLD AUTO: 0.4 % (ref 0–0.9)
INR PPP: 1.12 (ref 0.87–1.13)
KETONES UR STRIP.AUTO-MCNC: 40 MG/DL
LACTATE SERPL-SCNC: 1.7 MMOL/L (ref 0.5–2)
LEUKOCYTE ESTERASE UR QL STRIP.AUTO: NEGATIVE
LYMPHOCYTES # BLD AUTO: 0.76 K/UL (ref 1–4.8)
LYMPHOCYTES NFR BLD: 8 % (ref 22–41)
MCH RBC QN AUTO: 25.5 PG (ref 27–33)
MCHC RBC AUTO-ENTMCNC: 32.3 G/DL (ref 32.2–35.5)
MCV RBC AUTO: 78.9 FL (ref 81.4–97.8)
MICRO URNS: ABNORMAL
MONOCYTES # BLD AUTO: 0.75 K/UL (ref 0–0.85)
MONOCYTES NFR BLD AUTO: 7.9 % (ref 0–13.4)
NEUTROPHILS # BLD AUTO: 7.9 K/UL (ref 1.82–7.42)
NEUTROPHILS NFR BLD: 83 % (ref 44–72)
NITRITE UR QL STRIP.AUTO: NEGATIVE
NRBC # BLD AUTO: 0 K/UL
NRBC BLD-RTO: 0 /100 WBC (ref 0–0.2)
NT-PROBNP SERPL IA-MCNC: 284 PG/ML (ref 0–125)
PH UR STRIP.AUTO: 6 [PH] (ref 5–8)
PLATELET # BLD AUTO: 733 K/UL (ref 164–446)
PMV BLD AUTO: 9 FL (ref 9–12.9)
POTASSIUM SERPL-SCNC: 4.4 MMOL/L (ref 3.6–5.5)
PROCALCITONIN SERPL-MCNC: 0.18 NG/ML
PROT SERPL-MCNC: 6.8 G/DL (ref 6–8.2)
PROT UR QL STRIP: NEGATIVE MG/DL
PROTHROMBIN TIME: 14.5 SEC (ref 12–14.6)
RBC # BLD AUTO: 4.87 M/UL (ref 4.2–5.4)
RBC UR QL AUTO: NEGATIVE
RSV RNA SPEC QL NAA+PROBE: NEGATIVE
SARS-COV-2 RNA RESP QL NAA+PROBE: NEGATIVE
SODIUM SERPL-SCNC: 133 MMOL/L (ref 135–145)
SP GR UR STRIP.AUTO: >1.045
T4 FREE SERPL-MCNC: 1.34 NG/DL (ref 0.93–1.7)
TROPONIN T SERPL-MCNC: 12 NG/L (ref 6–19)
TSH SERPL-ACNC: 3.28 UIU/ML (ref 0.38–5.33)
UROBILINOGEN UR STRIP.AUTO-MCNC: 0.2 EU/DL
WBC # BLD AUTO: 9.5 K/UL (ref 4.8–10.8)

## 2025-06-25 PROCEDURE — 770004 HCHG ROOM/CARE - ONCOLOGY PRIVATE *

## 2025-06-25 PROCEDURE — 84439 ASSAY OF FREE THYROXINE: CPT

## 2025-06-25 PROCEDURE — 93005 ELECTROCARDIOGRAM TRACING: CPT | Mod: TC

## 2025-06-25 PROCEDURE — 84484 ASSAY OF TROPONIN QUANT: CPT

## 2025-06-25 PROCEDURE — 87040 BLOOD CULTURE FOR BACTERIA: CPT | Mod: 91

## 2025-06-25 PROCEDURE — 84443 ASSAY THYROID STIM HORMONE: CPT

## 2025-06-25 PROCEDURE — 84145 PROCALCITONIN (PCT): CPT

## 2025-06-25 PROCEDURE — 94760 N-INVAS EAR/PLS OXIMETRY 1: CPT

## 2025-06-25 PROCEDURE — 80053 COMPREHEN METABOLIC PANEL: CPT

## 2025-06-25 PROCEDURE — 87086 URINE CULTURE/COLONY COUNT: CPT

## 2025-06-25 PROCEDURE — 93005 ELECTROCARDIOGRAM TRACING: CPT | Mod: TC | Performed by: EMERGENCY MEDICINE

## 2025-06-25 PROCEDURE — A9270 NON-COVERED ITEM OR SERVICE: HCPCS | Performed by: STUDENT IN AN ORGANIZED HEALTH CARE EDUCATION/TRAINING PROGRAM

## 2025-06-25 PROCEDURE — 700102 HCHG RX REV CODE 250 W/ 637 OVERRIDE(OP): Performed by: STUDENT IN AN ORGANIZED HEALTH CARE EDUCATION/TRAINING PROGRAM

## 2025-06-25 PROCEDURE — 700111 HCHG RX REV CODE 636 W/ 250 OVERRIDE (IP): Mod: JZ | Performed by: STUDENT IN AN ORGANIZED HEALTH CARE EDUCATION/TRAINING PROGRAM

## 2025-06-25 PROCEDURE — 71275 CT ANGIOGRAPHY CHEST: CPT

## 2025-06-25 PROCEDURE — 83605 ASSAY OF LACTIC ACID: CPT

## 2025-06-25 PROCEDURE — 700117 HCHG RX CONTRAST REV CODE 255: Performed by: EMERGENCY MEDICINE

## 2025-06-25 PROCEDURE — 99285 EMERGENCY DEPT VISIT HI MDM: CPT

## 2025-06-25 PROCEDURE — 81003 URINALYSIS AUTO W/O SCOPE: CPT

## 2025-06-25 PROCEDURE — 83880 ASSAY OF NATRIURETIC PEPTIDE: CPT

## 2025-06-25 PROCEDURE — 0241U POC COV-2, FLU A/B, RSV BY PCR: CPT | Performed by: EMERGENCY MEDICINE

## 2025-06-25 PROCEDURE — 85652 RBC SED RATE AUTOMATED: CPT

## 2025-06-25 PROCEDURE — 99223 1ST HOSP IP/OBS HIGH 75: CPT | Mod: 25,AI | Performed by: STUDENT IN AN ORGANIZED HEALTH CARE EDUCATION/TRAINING PROGRAM

## 2025-06-25 PROCEDURE — 86140 C-REACTIVE PROTEIN: CPT

## 2025-06-25 PROCEDURE — 71045 X-RAY EXAM CHEST 1 VIEW: CPT

## 2025-06-25 PROCEDURE — 85610 PROTHROMBIN TIME: CPT

## 2025-06-25 PROCEDURE — 700105 HCHG RX REV CODE 258: Performed by: STUDENT IN AN ORGANIZED HEALTH CARE EDUCATION/TRAINING PROGRAM

## 2025-06-25 PROCEDURE — 85025 COMPLETE CBC W/AUTO DIFF WBC: CPT

## 2025-06-25 PROCEDURE — 94660 CPAP INITIATION&MGMT: CPT

## 2025-06-25 PROCEDURE — 74177 CT ABD & PELVIS W/CONTRAST: CPT

## 2025-06-25 PROCEDURE — 36415 COLL VENOUS BLD VENIPUNCTURE: CPT

## 2025-06-25 PROCEDURE — 99497 ADVNCD CARE PLAN 30 MIN: CPT | Performed by: STUDENT IN AN ORGANIZED HEALTH CARE EDUCATION/TRAINING PROGRAM

## 2025-06-25 RX ORDER — ALBUMIN (HUMAN) 12.5 G/50ML
50 SOLUTION INTRAVENOUS ONCE
Status: DISPENSED | OUTPATIENT
Start: 2025-06-26 | End: 2025-06-27

## 2025-06-25 RX ORDER — ONDANSETRON 2 MG/ML
4 INJECTION INTRAMUSCULAR; INTRAVENOUS EVERY 4 HOURS PRN
Status: DISCONTINUED | OUTPATIENT
Start: 2025-06-25 | End: 2025-06-30 | Stop reason: HOSPADM

## 2025-06-25 RX ORDER — MONTELUKAST SODIUM 10 MG/1
10 TABLET ORAL
COMMUNITY
Start: 2025-05-06

## 2025-06-25 RX ORDER — ACETAMINOPHEN 325 MG/1
650 TABLET ORAL EVERY 6 HOURS PRN
Status: DISCONTINUED | OUTPATIENT
Start: 2025-06-25 | End: 2025-06-26

## 2025-06-25 RX ORDER — GUAIFENESIN/DEXTROMETHORPHAN 100-10MG/5
10 SYRUP ORAL EVERY 6 HOURS PRN
Status: DISCONTINUED | OUTPATIENT
Start: 2025-06-25 | End: 2025-06-30 | Stop reason: HOSPADM

## 2025-06-25 RX ORDER — MECLIZINE HYDROCHLORIDE 25 MG/1
25 TABLET ORAL 2 TIMES DAILY PRN
Status: ON HOLD | COMMUNITY
End: 2025-06-30

## 2025-06-25 RX ORDER — OXYCODONE HYDROCHLORIDE 10 MG/1
10 TABLET ORAL
Refills: 0 | Status: DISCONTINUED | OUTPATIENT
Start: 2025-06-25 | End: 2025-06-30 | Stop reason: HOSPADM

## 2025-06-25 RX ORDER — POLYETHYLENE GLYCOL 3350 17 G/17G
1 POWDER, FOR SOLUTION ORAL
Status: DISCONTINUED | OUTPATIENT
Start: 2025-06-25 | End: 2025-06-30 | Stop reason: HOSPADM

## 2025-06-25 RX ORDER — EZETIMIBE 10 MG/1
10 TABLET ORAL DAILY
Status: DISCONTINUED | OUTPATIENT
Start: 2025-06-26 | End: 2025-06-30 | Stop reason: HOSPADM

## 2025-06-25 RX ORDER — ACETAMINOPHEN 325 MG/1
650 TABLET ORAL EVERY 6 HOURS
Status: DISCONTINUED | OUTPATIENT
Start: 2025-06-25 | End: 2025-06-26

## 2025-06-25 RX ORDER — HYDROMORPHONE HYDROCHLORIDE 1 MG/ML
0.5 INJECTION, SOLUTION INTRAMUSCULAR; INTRAVENOUS; SUBCUTANEOUS
Status: DISCONTINUED | OUTPATIENT
Start: 2025-06-25 | End: 2025-06-30 | Stop reason: HOSPADM

## 2025-06-25 RX ORDER — LORAZEPAM 1 MG/1
0.5 TABLET ORAL EVERY 4 HOURS PRN
Status: DISCONTINUED | OUTPATIENT
Start: 2025-06-25 | End: 2025-06-30 | Stop reason: HOSPADM

## 2025-06-25 RX ORDER — AMLODIPINE BESYLATE 5 MG/1
5 TABLET ORAL DAILY
COMMUNITY

## 2025-06-25 RX ORDER — AMOXICILLIN 250 MG
2 CAPSULE ORAL EVERY EVENING
Status: DISCONTINUED | OUTPATIENT
Start: 2025-06-25 | End: 2025-06-30 | Stop reason: HOSPADM

## 2025-06-25 RX ORDER — SODIUM CHLORIDE, SODIUM LACTATE, POTASSIUM CHLORIDE, AND CALCIUM CHLORIDE .6; .31; .03; .02 G/100ML; G/100ML; G/100ML; G/100ML
500 INJECTION, SOLUTION INTRAVENOUS
Status: DISCONTINUED | OUTPATIENT
Start: 2025-06-25 | End: 2025-06-26

## 2025-06-25 RX ORDER — OXYCODONE HYDROCHLORIDE 5 MG/1
5 TABLET ORAL
Refills: 0 | Status: DISCONTINUED | OUTPATIENT
Start: 2025-06-25 | End: 2025-06-30 | Stop reason: HOSPADM

## 2025-06-25 RX ORDER — ROSUVASTATIN CALCIUM 20 MG/1
20 TABLET, COATED ORAL EVERY EVENING
COMMUNITY
Start: 2025-04-10

## 2025-06-25 RX ORDER — IPRATROPIUM BROMIDE AND ALBUTEROL SULFATE 2.5; .5 MG/3ML; MG/3ML
3 SOLUTION RESPIRATORY (INHALATION)
Status: DISCONTINUED | OUTPATIENT
Start: 2025-06-25 | End: 2025-06-30 | Stop reason: HOSPADM

## 2025-06-25 RX ORDER — ROSUVASTATIN CALCIUM 20 MG/1
20 TABLET, COATED ORAL EVERY EVENING
Status: DISCONTINUED | OUTPATIENT
Start: 2025-06-25 | End: 2025-06-30 | Stop reason: HOSPADM

## 2025-06-25 RX ORDER — ASPIRIN 81 MG/1
81 TABLET ORAL DAILY
Status: DISCONTINUED | OUTPATIENT
Start: 2025-06-26 | End: 2025-06-30 | Stop reason: HOSPADM

## 2025-06-25 RX ORDER — FUROSEMIDE 10 MG/ML
40 INJECTION INTRAMUSCULAR; INTRAVENOUS 2 TIMES DAILY
Status: COMPLETED | OUTPATIENT
Start: 2025-06-25 | End: 2025-06-27

## 2025-06-25 RX ORDER — AMLODIPINE BESYLATE 5 MG/1
5 TABLET ORAL DAILY
Status: DISCONTINUED | OUTPATIENT
Start: 2025-06-26 | End: 2025-06-30 | Stop reason: HOSPADM

## 2025-06-25 RX ORDER — LABETALOL HYDROCHLORIDE 5 MG/ML
10 INJECTION, SOLUTION INTRAVENOUS EVERY 4 HOURS PRN
Status: DISCONTINUED | OUTPATIENT
Start: 2025-06-25 | End: 2025-06-30 | Stop reason: HOSPADM

## 2025-06-25 RX ORDER — CEFDINIR 300 MG/1
300 CAPSULE ORAL 2 TIMES DAILY
Status: ON HOLD | COMMUNITY
Start: 2025-06-24 | End: 2025-06-30

## 2025-06-25 RX ORDER — EZETIMIBE 10 MG/1
10 TABLET ORAL DAILY
COMMUNITY

## 2025-06-25 RX ORDER — ALBUTEROL SULFATE 90 UG/1
2 INHALANT RESPIRATORY (INHALATION)
Status: DISCONTINUED | OUTPATIENT
Start: 2025-06-25 | End: 2025-06-30 | Stop reason: HOSPADM

## 2025-06-25 RX ORDER — METHOCARBAMOL 750 MG/1
750 TABLET, FILM COATED ORAL 3 TIMES DAILY
Status: COMPLETED | OUTPATIENT
Start: 2025-06-25 | End: 2025-06-28

## 2025-06-25 RX ORDER — ACETAMINOPHEN 325 MG/1
650 TABLET ORAL EVERY 6 HOURS PRN
Status: DISCONTINUED | OUTPATIENT
Start: 2025-06-30 | End: 2025-06-26

## 2025-06-25 RX ORDER — MONTELUKAST SODIUM 10 MG/1
10 TABLET ORAL
Status: DISCONTINUED | OUTPATIENT
Start: 2025-06-25 | End: 2025-06-30 | Stop reason: HOSPADM

## 2025-06-25 RX ORDER — ONDANSETRON 4 MG/1
4 TABLET, ORALLY DISINTEGRATING ORAL EVERY 4 HOURS PRN
Status: DISCONTINUED | OUTPATIENT
Start: 2025-06-25 | End: 2025-06-30 | Stop reason: HOSPADM

## 2025-06-25 RX ADMIN — ACETAMINOPHEN 650 MG: 325 TABLET ORAL at 19:51

## 2025-06-25 RX ADMIN — PIPERACILLIN AND TAZOBACTAM 3.38 G: 3; .375 INJECTION, POWDER, FOR SOLUTION INTRAVENOUS at 22:10

## 2025-06-25 RX ADMIN — ROSUVASTATIN CALCIUM 20 MG: 20 TABLET, FILM COATED ORAL at 19:51

## 2025-06-25 RX ADMIN — DOCUSATE SODIUM 50 MG AND SENNOSIDES 8.6 MG 2 TABLET: 8.6; 5 TABLET, FILM COATED ORAL at 19:51

## 2025-06-25 RX ADMIN — IOHEXOL 85 ML: 350 INJECTION, SOLUTION INTRAVENOUS at 17:15

## 2025-06-25 RX ADMIN — FUROSEMIDE 40 MG: 10 INJECTION, SOLUTION INTRAVENOUS at 19:52

## 2025-06-25 RX ADMIN — PIPERACILLIN AND TAZOBACTAM 3.38 G: 3; .375 INJECTION, POWDER, FOR SOLUTION INTRAVENOUS at 20:03

## 2025-06-25 RX ADMIN — METHOCARBAMOL 750 MG: 750 TABLET ORAL at 19:51

## 2025-06-25 RX ADMIN — MONTELUKAST 10 MG: 10 TABLET, FILM COATED ORAL at 19:51

## 2025-06-25 ASSESSMENT — ENCOUNTER SYMPTOMS
FOCAL WEAKNESS: 0
NAUSEA: 0
WEAKNESS: 1
DOUBLE VISION: 0
MYALGIAS: 1
BRUISES/BLEEDS EASILY: 0
HEARTBURN: 0
CHILLS: 1
HEADACHES: 0
SHORTNESS OF BREATH: 1
BLURRED VISION: 0
SPUTUM PRODUCTION: 1
COUGH: 1
FEVER: 0
DEPRESSION: 0
ABDOMINAL PAIN: 0
ORTHOPNEA: 1
VOMITING: 0
PALPITATIONS: 0
DIZZINESS: 0

## 2025-06-25 ASSESSMENT — PATIENT HEALTH QUESTIONNAIRE - PHQ9
2. FEELING DOWN, DEPRESSED, IRRITABLE, OR HOPELESS: NOT AT ALL
SUM OF ALL RESPONSES TO PHQ9 QUESTIONS 1 AND 2: 0
1. LITTLE INTEREST OR PLEASURE IN DOING THINGS: NOT AT ALL

## 2025-06-25 ASSESSMENT — LIFESTYLE VARIABLES: SUBSTANCE_ABUSE: 0

## 2025-06-25 ASSESSMENT — PULMONARY FUNCTION TESTS: EPAP_CMH2O: 5

## 2025-06-25 ASSESSMENT — PAIN DESCRIPTION - PAIN TYPE: TYPE: ACUTE PAIN

## 2025-06-25 NOTE — ED TRIAGE NOTES
"Chief Complaint   Patient presents with    Shortness of Breath     Pt reports recent diagnosis of \"fluid on lung\", taking ATB. Pt reports worsening SOB and cough.     Pt to triage via wheelchair for above complaint.     Pt is alert & oriented and follows commands. Pt speaking in full sentences and responds appropriately to questions. No acute distress noted in triage. Respirations are even and unlabored. Skin is pink/warm/dry.    Pt brought back to [facility]. Report to primary RN.  "

## 2025-06-25 NOTE — ED PROVIDER NOTES
"ER Provider Note    Scribed for Erasmo Johns M.D. by Charlene Jones. 6/25/2025   3:16 PM    Primary Care Provider: Kita Miner M.D.    CHIEF COMPLAINT  Chief Complaint   Patient presents with    Shortness of Breath     Pt reports recent diagnosis of \"fluid on lung\", taking ATB. Pt reports worsening SOB and cough.     EXTERNAL RECORDS REVIEWED  Outpatient Notes The patient has a history of CAD. She had a x-ray two days ago that showed new bilateral pleural effusions.     HPI/ROS  LIMITATION TO HISTORY   Select: : None  OUTSIDE HISTORIAN(S):  Family members are present at bedside and provide additional information regarding the patient's history.    Sheree Garcia is a 80 y.o. female who presents to the ED for evaluation of shortness of breath. Patient was told that she had fluid on her lungs two days ago and adds that she has been having a productive cough for the past 3 weeks, adding that she also has an ear infection. This morning her symptoms had worsened, adding that her heart feels like it is beating fast. She denies any fever, nausea or vomiting. The patient has been on three rounds of antibiotics, noting that it went from a 5 day course to a 10 day course to another 10 day course. She does not remember the names of the antibiotics, but family reports that the second course was different than the first. She reports that her shortness of breath is alleviated with laying down flat. The patient denies any chest pain, but reports that her ribs are sore from coughing. She also reports that her legs appeared swollen this morning along with her abdomen, adding that her abdomen also feels hard. Her last bowel movement was this morning, however she notes that it was a small amount. There were no alleviating factors reported. She states that she has not seen Dr. Coleman recently. The patient denies any history of blood clots in her lungs or her legs. Denies any recent syncope episodes. The patient has " "an allergy to Diphenhydramine.     PAST MEDICAL HISTORY  Past Medical History[1]  Coronary artery disease, dyslipidemia, palpitations, hypertension      SURGICAL HISTORY  Past Surgical History[2]    FAMILY HISTORY  Family History   Problem Relation Age of Onset    Cancer Maternal Aunt         breast     SOCIAL HISTORY   reports that she has never smoked. She has never used smokeless tobacco. She reports current alcohol use.    CURRENT MEDICATIONS  Previous Medications    ALBUTEROL 108 (90 BASE) MCG/ACT AERO SOLN INHALATION AEROSOL    albuterol sulfate HFA 90 mcg/actuation aerosol inhaler   Inhale 2 puffs every 6-8 hours by inhalation route.    AMOXICILLIN (AMOXIL) 500 MG CAP        ASPIRIN 81 MG EC TABLET    Take 81 mg by mouth every day.    AZITHROMYCIN (ZITHROMAX) 250 MG TAB    azithromycin 250 mg tablet    CYANOCOBALAMIN (VITAMIN B-12) 1000 MCG TAB    Take 1,000 mcg by mouth every day.    FENOFIBRATE (TRIGLIDE) 160 MG TABLET    Take 160 mg by mouth every day.    MISC NATURAL PRODUCTS (GINSENG COMPLEX PO)    Take  by mouth every day.    MULTIPLE VITAMINS-MINERALS (HAIR SKIN NAILS PO)    Take  by mouth.    NON FORMULARY REQUEST    aller yoel   bid    OMEGA-3 FATTY ACIDS (FISH OIL) 1000 MG CAP CAPSULE    Take 1,000 mg by mouth every day.    ONDANSETRON (ZOFRAN ODT) 4 MG TABLET DISPERSIBLE    Take 1 Tab by mouth every 6 hours as needed for Nausea.    TURMERIC 500 MG CAP    Take  by mouth every day.    VITAMIN D, CHOLECALCIFEROL, 1000 UNITS CAP    Take  by mouth every day.       ALLERGIES  Allergies[3]     PHYSICAL EXAM  BP (!) 145/86   Pulse (!) 129   Temp 36.1 °C (97 °F) (Temporal)   Resp (!) 27   Ht 1.626 m (5' 4\")   Wt 78 kg (172 lb)   SpO2 93%   BMI 29.52 kg/m²      Constitutional: Ill-appearing, Well nourished, No distress,    HENT: Normocephalic, Atraumatic, Dry mucous membranes.   Eyes: PERRLA, EOMI, Conjunctiva normal, No discharge.   Neck: No tenderness, Supple, No stridor.   Cardiovascular: " Tachycardic, Normal rhythm.   Thorax & Lungs: Decreased breath sounds bilaterally at bases, No respiratory distress.   Abdomen: Soft, No tenderness, No masses.   Skin: Warm, Dry, No rash.    Musculoskeletal: 2+ lower extremity edema. No major deformities noted.  Neurologic: Awake, alert. Moves all extremities spontaneously.  Psychiatric: Affect normal, Judgment normal, Mood normal.        DIAGNOSTIC STUDIES    Labs/EKG:   Results for orders placed or performed during the hospital encounter of 06/25/25   Lactic Acid    Collection Time: 06/25/25  3:14 PM   Result Value Ref Range    Lactic Acid 1.7 0.5 - 2.0 mmol/L   Blood Culture - Draw one from central line and one from peripheral site    Collection Time: 06/25/25  3:14 PM    Specimen: Line; Blood   Result Value Ref Range    Significant Indicator NEG     Source BLD     Site Peripheral     Culture Result       No Growth  Note: Blood cultures are incubated for 5 days and  are monitored continuously.Positive blood cultures  are called to the RN and reported as soon as  they are identified.     proBrain Natriuretic Peptide, NT    Collection Time: 06/25/25  3:14 PM   Result Value Ref Range    NT-proBNP 284 (H) 0 - 125 pg/mL   Troponin    Collection Time: 06/25/25  3:14 PM   Result Value Ref Range    Troponin T 12 6 - 19 ng/L   CBC WITH DIFFERENTIAL    Collection Time: 06/25/25  3:14 PM   Result Value Ref Range    WBC 9.5 4.8 - 10.8 K/uL    RBC 4.87 4.20 - 5.40 M/uL    Hemoglobin 12.4 12.0 - 16.0 g/dL    Hematocrit 38.4 37.0 - 47.0 %    MCV 78.9 (L) 81.4 - 97.8 fL    MCH 25.5 (L) 27.0 - 33.0 pg    MCHC 32.3 32.2 - 35.5 g/dL    RDW 43.1 35.9 - 50.0 fL    Platelet Count 733 (H) 164 - 446 K/uL    MPV 9.0 9.0 - 12.9 fL    Neutrophils-Polys 83.00 (H) 44.00 - 72.00 %    Lymphocytes 8.00 (L) 22.00 - 41.00 %    Monocytes 7.90 0.00 - 13.40 %    Eosinophils 0.30 0.00 - 6.90 %    Basophils 0.40 0.00 - 1.80 %    Immature Granulocytes 0.40 0.00 - 0.90 %    Nucleated RBC 0.00 0.00 -  0.20 /100 WBC    Neutrophils (Absolute) 7.90 (H) 1.82 - 7.42 K/uL    Lymphs (Absolute) 0.76 (L) 1.00 - 4.80 K/uL    Monos (Absolute) 0.75 0.00 - 0.85 K/uL    Eos (Absolute) 0.03 0.00 - 0.51 K/uL    Baso (Absolute) 0.04 0.00 - 0.12 K/uL    Immature Granulocytes (abs) 0.04 0.00 - 0.11 K/uL    NRBC (Absolute) 0.00 K/uL   Comp Metabolic Panel    Collection Time: 25  3:14 PM   Result Value Ref Range    Sodium 133 (L) 135 - 145 mmol/L    Potassium 4.4 3.6 - 5.5 mmol/L    Chloride 97 96 - 112 mmol/L    Co2 21 20 - 33 mmol/L    Anion Gap 15.0 7.0 - 16.0    Glucose 130 (H) 65 - 99 mg/dL    Bun 8 8 - 22 mg/dL    Creatinine 0.48 (L) 0.50 - 1.40 mg/dL    Calcium 9.0 8.5 - 10.5 mg/dL    Correct Calcium 9.6 8.5 - 10.5 mg/dL    AST(SGOT) 46 (H) 12 - 45 U/L    ALT(SGPT) 32 2 - 50 U/L    Alkaline Phosphatase 128 (H) 30 - 99 U/L    Total Bilirubin 0.3 0.1 - 1.5 mg/dL    Albumin 3.3 3.2 - 4.9 g/dL    Total Protein 6.8 6.0 - 8.2 g/dL    Globulin 3.5 1.9 - 3.5 g/dL    A-G Ratio 0.9 g/dL   FREE THYROXINE    Collection Time: 25  3:14 PM   Result Value Ref Range    Free T-4 1.34 0.93 - 1.70 ng/dL   TSH    Collection Time: 25  3:14 PM   Result Value Ref Range    TSH 3.280 0.380 - 5.330 uIU/mL   ESTIMATED GFR    Collection Time: 25  3:14 PM   Result Value Ref Range    GFR (CKD-EPI) 96 >60 mL/min/1.73 m 2   EKG    Collection Time: 25  3:22 PM   Result Value Ref Range    Report       [facility] Emergency Dept.    Test Date:  2025  Pt Name:    JUSTINO SUN           Department: ER  MRN:        [id number]                      Room:  Gender:     Female                       Technician: [id number]  :        [id number]                   Requested By:ER TRIAGE PROTOCOL  Order #:    [id number]                    Reading MD: MELISSA SILVERIO MD    Measurements  Intervals                                Axis  Rate:       136                          P:          23  LA:         126                           QRS:        65  QRSD:       78                           T:          60  QT:         296  QTc:        446    Interpretive Statements  Sinus tachycardia  Low voltage, precordial leads  Compared to ECG 12/18/2018 10:10:52  Low QRS voltage now present  Sinus bradycardia no longer present  Electronically Signed On 06- 15:22:56 PDT by MELISSA SILVERIO MD     Blood Culture - Draw one from central line and one from peripheral site    Collection Time: 06/25/25  3:25 PM    Specimen: Peripheral; Blood   Result Value Ref Range    Significant Indicator NEG     Source BLD     Site PERIPHERAL     Culture Result       No Growth  Note: Blood cultures are incubated for 5 days and  are monitored continuously.Positive blood cultures  are called to the RN and reported as soon as  they are identified.     POC CoV-2, FLU A/B, RSV by PCR    Collection Time: 06/25/25  4:04 PM   Result Value Ref Range    POC Influenza A RNA, PCR NEGATIVE NEGATIVE    POC Influenza B RNA, PCR NEGATIVE NEGATIVE    POC RSV, by PCR NEGATIVE NEGATIVE    POC SARS-CoV-2, PCR NEGATIVE NEGATIVE       Radiology:   This attending emergency physician has independently interpreted the diagnostic imaging associated with this visit and is awaiting the final reading from the radiologist.   Preliminary interpretation is a follows: Large pleural effusions with ascites  Radiologist interpretation:   CT-ABDOMEN-PELVIS WITH   Final Result      1.  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   2.  Extensive peritoneal carcinomatosis with large volume ascites.   3.  Partially visualized large bilateral pleural effusions. Malignant pleural effusions are possible.      CT-CTA CHEST PULMONARY ARTERY W/ RECONS   Final Result      1.  No CT evidence for pulmonary emboli.   2.  Large bilateral pleural effusions with associated atelectasis.   3.  Ascites with findings concerning for peritoneal tumor implants in the upper abdomen.                DX-CHEST-PORTABLE (1 VIEW)   Final Result      1.  Worsening pulmonary edema.   2.  Worsening bilateral pleural effusions, larger on the RIGHT.   3.  No pneumothorax.      US-THORACENTESIS PUNCTURE RIGHT    (Results Pending)   US-THORACENTESIS PUNCTURE LEFT    (Results Pending)   US-PARACENTESIS, ABD WITH IMAGING    (Results Pending)          COURSE & MEDICAL DECISION MAKING     Sepsis: Infection was suspected 4:00 PM  (Time). Sepsis pathway was initiated. Fluids not needed (no hypotension or lactate greater than or equal to 4). Antibiotics were given per protocol.    INITIAL ASSESSMENT, COURSE AND PLAN  Differential diagnoses include but not limited to: Pleural effusion, Cancer, Atelectasis, Liver failure.      Care Narrative: Patient is coming in with moderate severe respiratory distress, the patient is very tachypneic, decreased breath sounds throughout.  The patient is hypoxic.  Put the patient on oxygen.  Did the patient tachypnea and tachycardia elected to get a CT PE study that was negative but showed bilateral large pleural effusions, also elected to get a CT scan abdomen pelvis that shows a large ovarian mass with ascites.  Had the patient on BiPAP which improved her symptoms, updated the patient on her diagnosis, discussed case with the pulmonologist for possible thoracentesis.  Discussed the case with the nurse practitioner covering for Dr. Caldwell, they will consult on the case, discussed case with the hospitalist for hospitalization.    3:16 PM - Patient was evaluated at bedside. Ordered for CT-CTA Chest Pulmonary Artery w/ recons, CT-Abdomen-Pelvis with, DX-chest, TSH, Free Thyroxine, CBC with diff, CMP, Free Thyroxine, TSH, BNP, Troponin, Lactic acid, UA, Urine culture, Blood culture x2, EKG to evaluate. Patient verbalizes understanding and support with my plan of care.     3:58 PM - Patient was reevaluated at bedside. Discussed plan of care ,including ordering labs to further evaluate. Patient  verbalizes understanding and agreement to this plan of care. Ordered SARS-CoV-2, Flu A/B, and RSV.     CRITICAL CARE  The very real possibilty of a deterioration of this patient's condition required the highest level of my preparedness for sudden, emergent intervention.  I provided critical care services, which included medication orders, frequent reevaluations of the patient's condition and response to treatment, ordering and reviewing test results, and discussing the case with various consultants.  The critical care time associated with the care of the patient was 35 minutes. Review chart for interventions. This time is exclusive of any other billable procedures.       DISPOSITION AND DISCUSSIONS    I have discussed management of the patient with the following physicians and LORI's: Pulmonology, gynecology oncology, hospitalist    FINAL DIAGNOSIS  1. Acute respiratory failure with hypoxia (HCC)    2. Malignant ascites    3. Malignant pleural effusion         ICharlene (Scribe), am scribing for, and in the presence of, Erasmo Johns M.D..    Electronically signed by: Charlene Jones (Scribe), 6/25/2025    IErasmo M.D. personally performed the services described in this documentation, as scribed by Charlene Jones in my presence, and it is both accurate and complete.      The note accurately reflects work and decisions made by me.  Erasmo Johns M.D.  6/25/2025  6:14 PM         [1]   Past Medical History:  Diagnosis Date    Arthritis     CAD (coronary artery disease)     Cataract     IOL    Heart burn     Indigestion    [2]   Past Surgical History:  Procedure Laterality Date    THYROID LOBECTOMY Left 12/26/2018    Procedure: THYROID LOBECTOMY - AND ISTHMUSECTOMY;  Surgeon: Yobani Otero M.D.;  Location: SURGERY SAME DAY [facility];  Service: General    HYSTERECTOMY LAPAROSCOPY      OTHER      mass in breast    OTHER ORTHOPEDIC SURGERY      broken ankle   [3]   Allergies  Allergen  Reactions    Diphenhydramine Unspecified

## 2025-06-26 ENCOUNTER — APPOINTMENT (OUTPATIENT)
Dept: RADIOLOGY | Facility: MEDICAL CENTER | Age: 80
DRG: 374 | End: 2025-06-26
Attending: EMERGENCY MEDICINE
Payer: MEDICARE

## 2025-06-26 ENCOUNTER — APPOINTMENT (OUTPATIENT)
Dept: RADIOLOGY | Facility: MEDICAL CENTER | Age: 80
End: 2025-06-26
Attending: STUDENT IN AN ORGANIZED HEALTH CARE EDUCATION/TRAINING PROGRAM
Payer: MEDICARE

## 2025-06-26 LAB
ALBUMIN FLD-MCNC: 2.4 G/DL
ALBUMIN SERPL BCP-MCNC: 3 G/DL (ref 3.2–4.9)
ALBUMIN/GLOB SERPL: 0.9 G/DL
ALP SERPL-CCNC: 116 U/L (ref 30–99)
ALT SERPL-CCNC: 31 U/L (ref 2–50)
AMYLASE FLD-CCNC: 27 U/L
AMYLASE FLD-CCNC: 34 U/L
ANION GAP SERPL CALC-SCNC: 13 MMOL/L (ref 7–16)
APPEARANCE FLD: NORMAL
APPEARANCE FLD: NORMAL
AST SERPL-CCNC: 39 U/L (ref 12–45)
BASOPHILS # BLD AUTO: 0.4 % (ref 0–1.8)
BASOPHILS # BLD: 0.03 K/UL (ref 0–0.12)
BASOPHILS NFR FLD: 1 %
BILIRUB SERPL-MCNC: 0.3 MG/DL (ref 0.1–1.5)
BODY FLD TYPE: NORMAL
BUN SERPL-MCNC: 8 MG/DL (ref 8–22)
CALCIUM ALBUM COR SERPL-MCNC: 9.6 MG/DL (ref 8.5–10.5)
CALCIUM SERPL-MCNC: 8.8 MG/DL (ref 8.5–10.5)
CANCER AG125 SERPL-ACNC: 1392 U/ML (ref 0–35)
CELLS FLD: 8
CHLORIDE SERPL-SCNC: 98 MMOL/L (ref 96–112)
CO2 SERPL-SCNC: 24 MMOL/L (ref 20–33)
COLOR FLD: NORMAL
COLOR FLD: NORMAL
CREAT SERPL-MCNC: 0.54 MG/DL (ref 0.5–1.4)
EOSINOPHIL # BLD AUTO: 0.05 K/UL (ref 0–0.51)
EOSINOPHIL NFR BLD: 0.7 % (ref 0–6.9)
ERYTHROCYTE [DISTWIDTH] IN BLOOD BY AUTOMATED COUNT: 44.5 FL (ref 35.9–50)
GFR SERPLBLD CREATININE-BSD FMLA CKD-EPI: 93 ML/MIN/1.73 M 2
GLOBULIN SER CALC-MCNC: 3.4 G/DL (ref 1.9–3.5)
GLUCOSE FLD-MCNC: 103 MG/DL
GLUCOSE FLD-MCNC: 84 MG/DL
GLUCOSE SERPL-MCNC: 104 MG/DL (ref 65–99)
GRAM STN SPEC: NORMAL
GRAM STN SPEC: NORMAL
HCT VFR BLD AUTO: 38.1 % (ref 37–47)
HGB BLD-MCNC: 11.8 G/DL (ref 12–16)
IMM GRANULOCYTES # BLD AUTO: 0.02 K/UL (ref 0–0.11)
IMM GRANULOCYTES NFR BLD AUTO: 0.3 % (ref 0–0.9)
LDH FLD L TO P-CCNC: 218 U/L
LDH SERPL L TO P-CCNC: 179 U/L (ref 107–266)
LYMPHOCYTES # BLD AUTO: 0.72 K/UL (ref 1–4.8)
LYMPHOCYTES NFR BLD: 9.9 % (ref 22–41)
LYMPHOCYTES NFR FLD: 74 %
LYMPHOCYTES NFR FLD: 77 %
MAGNESIUM SERPL-MCNC: 2 MG/DL (ref 1.5–2.5)
MCH RBC QN AUTO: 25 PG (ref 27–33)
MCHC RBC AUTO-ENTMCNC: 31 G/DL (ref 32.2–35.5)
MCV RBC AUTO: 80.7 FL (ref 81.4–97.8)
MONOCYTES # BLD AUTO: 0.68 K/UL (ref 0–0.85)
MONOCYTES NFR BLD AUTO: 9.3 % (ref 0–13.4)
MONOS+MACROS NFR FLD MANUAL: 15 %
MONOS+MACROS NFR FLD MANUAL: 6 %
NEUTROPHILS # BLD AUTO: 5.79 K/UL (ref 1.82–7.42)
NEUTROPHILS NFR BLD: 79.4 % (ref 44–72)
NEUTROPHILS NFR FLD: 16 %
NEUTROPHILS NFR FLD: 3 %
NRBC # BLD AUTO: 0 K/UL
NRBC BLD-RTO: 0 /100 WBC (ref 0–0.2)
NUC CELL # FLD: 1084 CELLS/UL
NUC CELL # FLD: 1612 CELLS/UL
PH FLD: 8 [PH]
PHOSPHATE SERPL-MCNC: 4.3 MG/DL (ref 2.5–4.5)
PLATELET # BLD AUTO: 594 K/UL (ref 164–446)
PMV BLD AUTO: 8.9 FL (ref 9–12.9)
POTASSIUM SERPL-SCNC: 4.3 MMOL/L (ref 3.6–5.5)
PROT FLD-MCNC: 3.9 G/DL
PROT SERPL-MCNC: 6.4 G/DL (ref 6–8.2)
RBC # BLD AUTO: 4.72 M/UL (ref 4.2–5.4)
RBC # FLD: NORMAL CELLS/UL
RBC # FLD: NORMAL CELLS/UL
SIGNIFICANT IND 70042: NORMAL
SIGNIFICANT IND 70042: NORMAL
SITE SITE: NORMAL
SITE SITE: NORMAL
SODIUM SERPL-SCNC: 135 MMOL/L (ref 135–145)
SOURCE SOURCE: NORMAL
SOURCE SOURCE: NORMAL
WBC # BLD AUTO: 7.3 K/UL (ref 4.8–10.8)

## 2025-06-26 PROCEDURE — 88112 CYTOPATH CELL ENHANCE TECH: CPT | Performed by: PATHOLOGY

## 2025-06-26 PROCEDURE — 83615 LACTATE (LD) (LDH) ENZYME: CPT | Mod: 91

## 2025-06-26 PROCEDURE — 36415 COLL VENOUS BLD VENIPUNCTURE: CPT

## 2025-06-26 PROCEDURE — C1729 CATH, DRAINAGE: HCPCS

## 2025-06-26 PROCEDURE — 83986 ASSAY PH BODY FLUID NOS: CPT

## 2025-06-26 PROCEDURE — 88305 TISSUE EXAM BY PATHOLOGIST: CPT | Mod: 26 | Performed by: PATHOLOGY

## 2025-06-26 PROCEDURE — 93970 EXTREMITY STUDY: CPT

## 2025-06-26 PROCEDURE — 87116 MYCOBACTERIA CULTURE: CPT

## 2025-06-26 PROCEDURE — 89051 BODY FLUID CELL COUNT: CPT

## 2025-06-26 PROCEDURE — 0W993ZZ DRAINAGE OF RIGHT PLEURAL CAVITY, PERCUTANEOUS APPROACH: ICD-10-PCS | Performed by: NURSE PRACTITIONER

## 2025-06-26 PROCEDURE — 85025 COMPLETE CBC W/AUTO DIFF WBC: CPT

## 2025-06-26 PROCEDURE — 770004 HCHG ROOM/CARE - ONCOLOGY PRIVATE *

## 2025-06-26 PROCEDURE — 700111 HCHG RX REV CODE 636 W/ 250 OVERRIDE (IP): Mod: JZ | Performed by: STUDENT IN AN ORGANIZED HEALTH CARE EDUCATION/TRAINING PROGRAM

## 2025-06-26 PROCEDURE — 0W9G3ZZ DRAINAGE OF PERITONEAL CAVITY, PERCUTANEOUS APPROACH: ICD-10-PCS | Performed by: NURSE PRACTITIONER

## 2025-06-26 PROCEDURE — 83735 ASSAY OF MAGNESIUM: CPT

## 2025-06-26 PROCEDURE — 71045 X-RAY EXAM CHEST 1 VIEW: CPT

## 2025-06-26 PROCEDURE — 87102 FUNGUS ISOLATION CULTURE: CPT

## 2025-06-26 PROCEDURE — 82247 BILIRUBIN TOTAL: CPT

## 2025-06-26 PROCEDURE — 82042 OTHER SOURCE ALBUMIN QUAN EA: CPT

## 2025-06-26 PROCEDURE — 82945 GLUCOSE OTHER FLUID: CPT | Mod: 91

## 2025-06-26 PROCEDURE — 87205 SMEAR GRAM STAIN: CPT

## 2025-06-26 PROCEDURE — 87070 CULTURE OTHR SPECIMN AEROBIC: CPT

## 2025-06-26 PROCEDURE — 700102 HCHG RX REV CODE 250 W/ 637 OVERRIDE(OP): Performed by: STUDENT IN AN ORGANIZED HEALTH CARE EDUCATION/TRAINING PROGRAM

## 2025-06-26 PROCEDURE — 99233 SBSQ HOSP IP/OBS HIGH 50: CPT | Mod: 25 | Performed by: STUDENT IN AN ORGANIZED HEALTH CARE EDUCATION/TRAINING PROGRAM

## 2025-06-26 PROCEDURE — 84157 ASSAY OF PROTEIN OTHER: CPT

## 2025-06-26 PROCEDURE — 86304 IMMUNOASSAY TUMOR CA 125: CPT

## 2025-06-26 PROCEDURE — 700105 HCHG RX REV CODE 258: Performed by: STUDENT IN AN ORGANIZED HEALTH CARE EDUCATION/TRAINING PROGRAM

## 2025-06-26 PROCEDURE — 80053 COMPREHEN METABOLIC PANEL: CPT

## 2025-06-26 PROCEDURE — 88112 CYTOPATH CELL ENHANCE TECH: CPT | Mod: 26 | Performed by: PATHOLOGY

## 2025-06-26 PROCEDURE — A9270 NON-COVERED ITEM OR SERVICE: HCPCS | Performed by: STUDENT IN AN ORGANIZED HEALTH CARE EDUCATION/TRAINING PROGRAM

## 2025-06-26 PROCEDURE — 88305 TISSUE EXAM BY PATHOLOGIST: CPT | Performed by: PATHOLOGY

## 2025-06-26 PROCEDURE — 82150 ASSAY OF AMYLASE: CPT

## 2025-06-26 PROCEDURE — 93970 EXTREMITY STUDY: CPT | Mod: 26 | Performed by: INTERNAL MEDICINE

## 2025-06-26 PROCEDURE — 84100 ASSAY OF PHOSPHORUS: CPT

## 2025-06-26 PROCEDURE — 99497 ADVNCD CARE PLAN 30 MIN: CPT | Performed by: STUDENT IN AN ORGANIZED HEALTH CARE EDUCATION/TRAINING PROGRAM

## 2025-06-26 RX ORDER — ACETAMINOPHEN 325 MG/1
650 TABLET ORAL EVERY 4 HOURS PRN
Status: DISCONTINUED | OUTPATIENT
Start: 2025-06-26 | End: 2025-06-30 | Stop reason: HOSPADM

## 2025-06-26 RX ORDER — ENOXAPARIN SODIUM 100 MG/ML
40 INJECTION SUBCUTANEOUS DAILY
Status: DISCONTINUED | OUTPATIENT
Start: 2025-06-26 | End: 2025-06-30 | Stop reason: HOSPADM

## 2025-06-26 RX ADMIN — METHOCARBAMOL 750 MG: 750 TABLET ORAL at 16:05

## 2025-06-26 RX ADMIN — PIPERACILLIN AND TAZOBACTAM 3.38 G: 3; .375 INJECTION, POWDER, FOR SOLUTION INTRAVENOUS at 13:13

## 2025-06-26 RX ADMIN — OXYCODONE 5 MG: 5 TABLET ORAL at 17:34

## 2025-06-26 RX ADMIN — EZETIMIBE 10 MG: 10 TABLET ORAL at 05:08

## 2025-06-26 RX ADMIN — ACETAMINOPHEN 650 MG: 325 TABLET ORAL at 00:10

## 2025-06-26 RX ADMIN — MONTELUKAST 10 MG: 10 TABLET, FILM COATED ORAL at 21:41

## 2025-06-26 RX ADMIN — METHOCARBAMOL 750 MG: 750 TABLET ORAL at 05:08

## 2025-06-26 RX ADMIN — METHOCARBAMOL 750 MG: 750 TABLET ORAL at 13:10

## 2025-06-26 RX ADMIN — ENOXAPARIN SODIUM 40 MG: 100 INJECTION SUBCUTANEOUS at 18:00

## 2025-06-26 RX ADMIN — ROSUVASTATIN CALCIUM 20 MG: 20 TABLET, FILM COATED ORAL at 16:05

## 2025-06-26 RX ADMIN — ACETAMINOPHEN 650 MG: 325 TABLET ORAL at 05:09

## 2025-06-26 RX ADMIN — OXYCODONE 5 MG: 5 TABLET ORAL at 14:33

## 2025-06-26 RX ADMIN — FUROSEMIDE 40 MG: 10 INJECTION, SOLUTION INTRAVENOUS at 16:05

## 2025-06-26 RX ADMIN — FUROSEMIDE 40 MG: 10 INJECTION, SOLUTION INTRAVENOUS at 05:09

## 2025-06-26 RX ADMIN — OXYCODONE 5 MG: 5 TABLET ORAL at 21:41

## 2025-06-26 RX ADMIN — PIPERACILLIN AND TAZOBACTAM 3.38 G: 3; .375 INJECTION, POWDER, FOR SOLUTION INTRAVENOUS at 05:10

## 2025-06-26 RX ADMIN — DOCUSATE SODIUM 50 MG AND SENNOSIDES 8.6 MG 2 TABLET: 8.6; 5 TABLET, FILM COATED ORAL at 16:05

## 2025-06-26 ASSESSMENT — ENCOUNTER SYMPTOMS
FEVER: 0
FOCAL WEAKNESS: 0
HEADACHES: 1
COUGH: 1
CONSTIPATION: 1
NAUSEA: 0
ABDOMINAL PAIN: 1
SHORTNESS OF BREATH: 1
MYALGIAS: 0
VOMITING: 0
SORE THROAT: 0
SENSORY CHANGE: 0
CHILLS: 1

## 2025-06-26 ASSESSMENT — PAIN DESCRIPTION - PAIN TYPE
TYPE: ACUTE PAIN

## 2025-06-26 ASSESSMENT — LIFESTYLE VARIABLES
HOW MANY TIMES IN THE PAST YEAR HAVE YOU HAD 5 OR MORE DRINKS IN A DAY: 0
HAVE YOU EVER FELT YOU SHOULD CUT DOWN ON YOUR DRINKING: NO
EVER FELT BAD OR GUILTY ABOUT YOUR DRINKING: NO
AVERAGE NUMBER OF DAYS PER WEEK YOU HAVE A DRINK CONTAINING ALCOHOL: 0
DOES PATIENT WANT TO STOP DRINKING: CANNOT ASSESS
ALCOHOL_USE: NO
HAVE PEOPLE ANNOYED YOU BY CRITICIZING YOUR DRINKING: NO
ON A TYPICAL DAY WHEN YOU DRINK ALCOHOL HOW MANY DRINKS DO YOU HAVE: 0
CONSUMPTION TOTAL: INCOMPLETE

## 2025-06-26 NOTE — ED NOTES
Med Rec complete per pharmacy   Allergies reviewed  Anti-MICROBIAL (antivirals/antibiotics/antifungal) in past 30 days: yes  Anticoagulant in past 14 days: no  Dispense history available in EPIC: yes  Pharmacy patient utilizes:[facility] on [address]    Patient on daily Aspirin 81 mg.     Patient and family member at bedside unable to verify names of medications. Per family member patient started Abx on 6/24/25.     Per family member patient took her medications today at noon (unable to specify names of which ones).     Verified current medications with pharmacy.

## 2025-06-26 NOTE — CARE PLAN
The patient is Watcher - Medium risk of patient condition declining or worsening    Shift Goals  Clinical Goals: Complete paracentesis and thoracentesis  Patient Goals: Updates on POC  Family Goals: Updates on POC    Progress made toward(s) clinical / shift goals:  Patient completed procedure today. Patient and family updated on POC with no additional questions at this time.

## 2025-06-26 NOTE — CONSULTS
RCC    I have been contacted regarding appropriate disposition of Sheree Garcia. I have reviewed the patient's case including chart review, review of labs, imaging, vital signs/limited exam in the ER, conversation with ER physician, hospitalist, family and patient including discussion of assessment and plan. This patient is deemed safe to be admitted to the [facility] for ongoing care and remains under the care of hospitalist team (who all questions and concerns should be relayed to).  While a critical care consultation has not been requested, we are immediately available if the patient requires critical care in the future.    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 Lancaster has been consulted and this is an excellent idea   Palliative care consultation may clinically be appropriate as well

## 2025-06-26 NOTE — ASSESSMENT & PLAN NOTE
Followed by Dr. Coleman  No history of stent or CABG  - Holding ASA for possible surgery  - Continue statin and Zetia

## 2025-06-26 NOTE — CARE PLAN
The patient is Watcher - Medium risk of patient condition declining or worsening    Shift Goals  Clinical Goals: talk about plan of care with pt and pt family  Patient Goals: get updated on plan of car    Progress made toward(s) clinical / shift goals:      Problem: Knowledge Deficit - Standard  Goal: Patient and family/care givers will demonstrate understanding of plan of care, disease process/condition, diagnostic tests and medications  Outcome: Progressing  Note: Patient is AxO x4 and understands plan of care, all questions answered at this time. Call light and personal belongings are within reach. Pt calls appropriately for nursing needs. Frequent rounding in place. Bed is locked and in lowest position.      Problem: Pain - Standard  Goal: Alleviation of pain or a reduction in pain to the patient’s comfort goal  Outcome: Progressing  Note: Pt rates pain using 1-10 pain scale. Pt medicated per mar. Pt requests pain medication appropriately. Patients pain reassessed within 1-2 hours of giving pain medication. Pt educated on non-pharmacological techniques to decrease pain.    Pt receiving scheduled tylenol and robaxin which has helped keep her pain under a tolerable level for her. Pt does not like to take pain medication usually.        Patient is not progressing towards the following goals:

## 2025-06-26 NOTE — ASSESSMENT & PLAN NOTE
Secondary to bilateral pleural effusions  Initially requiring BiPAP in the ED  Currently on 2 L/min supplemental O2  - Patient does not In Place

## 2025-06-26 NOTE — CONSULTS
Gynecologic Oncology Consultation    Date: 2025    Requesting Physician: Dr. Erasmo Johns     Consulting Physician: Dr. Agapito Lancaster/MARJORIE Linn     Reason for consultation: Pelvic mass    CC: Shortness of breath     HPI: This is a  80 y.o. female which hx of CAD and hysterectomy  who presented to the ER yesterday with complaint of shortness of breath and productive cough for 3 weeks.  She had been on outpatient antibiotics without relief from her cough.  In the ER she had a CTA which was negative for PE but did show large bilateral pleural effusions.  CT abdomen pelvis also obtained in the ER is notable for a 20 cm multiloculated cystic ovarian neoplasm in the mid pelvis, extensive peritoneal carcinomatosis with large volume ascites.  Given these findings GYN oncology was consulted.      Patient was seen and examined at bedside with her daughter present.  Patient reports that she was in her usual state of health until approximately 6 weeks ago when she developed an ear infection.  Around that same time she noticed a nonproductive cough.  She was treated with multiple rounds of antibiotics for her ear infection and cough with no improvement in her cough.  She then developed shortness of breath and was told that she had fluid in her lungs after having a chest x-ray.  She also noted increasing abdominal distention over the last 6 weeks as well as decreased appetite, fatigue, chills, and lower extremity swelling.  She also endorses constipation which is slowly worsened over the last several weeks.  Her last normal bowel movement was 2 or 3 weeks ago and she has since had a small amount of stool passing most days but does not feel that she is able to fully evacuate her bowels.  She denies having any urinary symptoms but does endorse chronic urinary incontinence; this is unchanged.  Reports decreased UO due to poor oral intake over the past several weeks. Denies hematuria.  She reports having  "hysterectomy at age 40 for \"adhesions\" and pain possibly due to endometriosis., but she cannot recall if she was diagnosed with endometriosis  She reports that her cervix and oavries were left in place at the time of her hysterectomy and this was done via Pfannenstiel incision.  She denies a history of abnormal Pap smears, cannot recall when her last Pap smear was.  Was on HRT for approximately 1 year after her hysterectomy.  Use oral contraceptive pills in the past for birth control.  Other surgical history is significant for removal of a left breast mass which was benign, right ankle surgery, and partial thyroidectomy.  Family history significant for of her maternal aunt with breast cancer and a brother with lung cancer.  States that she has history of recurrent sinus infections and was started on albuterol and Singulair by her primary care provider for sinus infections approximately 6 months ago, she denies ever being diagnosed with COPD or asthma.  Overall she reports feeling very uncomfortable secondary to her abdominal distention and endorses pelvic pain.    Review of Systems   Constitutional:  Positive for chills. Negative for fever.   HENT:  Positive for congestion. Negative for sore throat.    Respiratory:  Positive for cough and shortness of breath.    Cardiovascular:  Positive for leg swelling. Negative for chest pain.   Gastrointestinal:  Positive for abdominal pain and constipation. Negative for nausea and vomiting.   Genitourinary:  Negative for dysuria, frequency and hematuria.   Musculoskeletal:  Negative for myalgias.   Neurological:  Positive for headaches. Negative for sensory change and focal weakness.        Past Medical History[1]    Past Surgical History[2]    OB/GYN History: ,  x  2  LMP age 40  menarche at 13  STD: no hx of   Last pap unknown, no history of abnormal  BCM: OCP in the past. 1 year of HRT  ?History of endometriosis. S/p supracervical hysterectomy     Current " "Medications[3]    Allergies:  Patient has no known allergies.    Social History     Socioeconomic History    Marital status:      Spouse name: Not on file    Number of children: Not on file    Years of education: Not on file    Highest education level: Not on file   Occupational History    Not on file   Tobacco Use    Smoking status: Never    Smokeless tobacco: Never   Substance and Sexual Activity    Alcohol use: Yes     Comment: 1 per month    Drug use: Not on file    Sexual activity: Not on file   Other Topics Concern    Not on file   Social History Narrative    Not on file     Social Drivers of Health     Financial Resource Strain: Not on file   Food Insecurity: Not on file   Transportation Needs: Not on file   Physical Activity: Not on file   Stress: Not on file   Social Connections: Not on file   Intimate Partner Violence: Not At Risk (6/25/2025)    Humiliation, Afraid, Rape, and Kick questionnaire     Fear of Current or Ex-Partner: No     Emotionally Abused: No     Physically Abused: No     Sexually Abused: No   Housing Stability: Not on file       Family History   Problem Relation Age of Onset    Cancer Maternal Aunt         breast         Physical Exam:  BP (!) 145/89   Pulse 96   Temp 36.7 °C (98 °F) (Temporal)   Resp (!) 24   Ht 1.626 m (5' 4\")   Wt 78 kg (172 lb)   SpO2 93%         Physical Exam  Constitutional:       Appearance: She is not toxic-appearing.   HENT:      Head: Normocephalic.   Eyes:      General:         Right eye: No discharge.   Cardiovascular:      Rate and Rhythm: Tachycardia present.      Pulses: Normal pulses.   Pulmonary:      Effort: Respiratory distress present.   Abdominal:      General: There is distension.      Palpations: There is mass.      Tenderness: There is no abdominal tenderness. There is no guarding.      Comments: Firm mass palpated above umbilicus.    Genitourinary:     General: Normal vulva.      Comments: Unable to palpate cervix, no cul-de-sac " nodularity. RN Max at bedside during pelvic exam   Musculoskeletal:      Right lower leg: Edema present.      Left lower leg: Edema present.   Skin:     General: Skin is warm.      Capillary Refill: Capillary refill takes less than 2 seconds.   Neurological:      General: No focal deficit present.      Mental Status: She is alert and oriented to person, place, and time.      Cranial Nerves: No cranial nerve deficit.   Psychiatric:         Mood and Affect: Mood normal.         Behavior: Behavior normal.          Labs:  Recent Labs     06/25/25  1514 06/26/25  0531   WBC 9.5 7.3   RBC 4.87 4.72   HEMOGLOBIN 12.4 11.8*   HEMATOCRIT 38.4 38.1   MCV 78.9* 80.7*   MCH 25.5* 25.0*   MCHC 32.3 31.0*   RDW 43.1 44.5   PLATELETCT 733* 594*   MPV 9.0 8.9*     Recent Labs     06/25/25  1514 06/26/25  0531   SODIUM 133* 135   POTASSIUM 4.4 4.3   CHLORIDE 97 98   CO2 21 24   GLUCOSE 130* 104*   BUN 8 8   CREATININE 0.48* 0.54   CALCIUM 9.0 8.8     Recent Labs     06/25/25  1514   INR 1.12     Recent Labs     06/25/25  1514 06/26/25  0531   ASTSGOT 46* 39   ALTSGPT 32 31   TBILIRUBIN 0.3 0.3   ALKPHOSPHAT 128* 116*   GLOBULIN 3.5 3.4   INR 1.12  --        Radiology:  CT AP 6/25/25  IMPRESSION:     1.  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  2.  Extensive peritoneal carcinomatosis with large volume ascites.  3.  Partially visualized large bilateral pleural effusions. Malignant pleural effusions are possible.    CT Chest 6/25/25    IMPRESSION:     1.  No CT evidence for pulmonary emboli.  2.  Large bilateral pleural effusions with associated atelectasis.  3.  Ascites with findings concerning for peritoneal tumor implants in the upper abdomen.    Assessment/Plan: This is a 80 y.o. female who presents with shortness of breath found to have large bilateral pleural effusions and a 20 cm ovarian neoplasm on CT.     #Pelvic mass: 20 cm multiloculated cystic and associated with peritoneal carcinomatosis and  large volume ascites.  Patient also noted to have large pleural effusions.   - 1392  -Dr. Lancaster had raffi discussion with patient and family at bedside regarding high clinical suspicion for stage IV ovarian cancer.  He reviewed treatment options including doing nothing and opting for symptom management with hospice or  neoadjuvant chemotherapy followed by interval cytoreductive surgery versus initial debulking if patient is severely symptomatic/in pain.  As patient currently denies severe pain symptoms, he would recommend against proceeding with surgery upfront.  Patient wishes to consider her options.  If she does opt to proceed with neoadjuvant chemotherapy we will plan to start treatment while she is hospitalized.    #Pleural effusions: Most likely secondary to underlying malignancy.   -S/p right thoracentesis today with 1750 ml removed  -Plan for left thoracentesis  -Follow cytology    #History of CAD: Follows with Dr. Pinzon for cardiology. On ASA at home.     Patient seen in conjunction with Dr. Lancaster today.     Thank you for for allowing us to participate in this patient's care. We will continue to follow/plan to see her in the office. Please feel free to contact us for any questions or if we can be of any further assistance.               [1]   Past Medical History:  Diagnosis Date    Arthritis     CAD (coronary artery disease)     Cataract     IOL    Heart burn     Indigestion    [2]   Past Surgical History:  Procedure Laterality Date    THYROID LOBECTOMY Left 12/26/2018    Procedure: THYROID LOBECTOMY - AND ISTHMUSECTOMY;  Surgeon: Yobani Otero M.D.;  Location: SURGERY SAME DAY [facility];  Service: General    HYSTERECTOMY LAPAROSCOPY      OTHER      mass in breast    OTHER ORTHOPEDIC SURGERY      broken ankle   [3]   Current Facility-Administered Medications   Medication Dose Route Frequency Provider Last Rate Last Admin    acetaminophen (Tylenol) tablet 650 mg  650 mg Oral Q6HRS PRN Kendrick Acevedo,  M.D.        labetalol (Normodyne/Trandate) injection 10 mg  10 mg Intravenous Q4HRS PRN Kendrick Acevedo M.D.        ondansetron (Zofran) syringe/vial injection 4 mg  4 mg Intravenous Q4HRS PRN Kendrick Acevedo M.D.        Or    ondansetron (Zofran ODT) dispertab 4 mg  4 mg Oral Q4HRS PRN Kendrick Acevedo M.D.        guaiFENesin dextromethorphan (Robitussin DM) 100-10 MG/5ML syrup 10 mL  10 mL Oral Q6HRS PRN Kendrick Acevedo M.D.        senna-docusate (Pericolace Or Senokot S) 8.6-50 MG per tablet 2 Tablet  2 Tablet Oral Q EVENING Kendrick Acevedo M.D.   2 Tablet at 06/25/25 1951    And    polyethylene glycol/lytes (Miralax) Packet 1 Packet  1 Packet Oral QDAY PRN Kendrick Acevedo M.D.        ipratropium-albuterol (DUONEB) nebulizer solution  3 mL Nebulization Q4H PRN (RT) Kendrick Acevedo M.D.        oxyCODONE immediate-release (Roxicodone) tablet 5 mg  5 mg Oral Q3HRS PRN Kendrick Acevedo M.D.        Or    oxyCODONE immediate release (Roxicodone) tablet 10 mg  10 mg Oral Q3HRS PRN Kendrick Acevedo M.D.        Or    HYDROmorphone (Dilaudid) injection 0.5 mg  0.5 mg Intravenous Q3HRS PRN Kendrick Acevedo M.D.        piperacillin-tazobactam (Zosyn) 3.375 g in  mL IVPB  3.375 g Intravenous Q8HRS Kendrick Acevedo M.D.   Stopped at 06/26/25 0910    furosemide (Lasix) injection 40 mg  40 mg Intravenous BID Kendrick Acevedo M.D.   40 mg at 06/26/25 0509    albumin human 25% solution 50 g  50 g Intravenous Once Kendrick Acevedo M.D.        albuterol inhaler 2 Puff  2 Puff Inhalation Q4H PRN (RT) Kendrick Acevedo M.D.        [Held by provider] amLODIPine (Norvasc) tablet 5 mg  5 mg Oral DAILY Kendrick Acevedo M.D.        [Held by provider] aspirin EC tablet 81 mg  81 mg Oral DAILY Kendrick Acevedo M.D.        ezetimibe (Zetia) tablet 10 mg  10 mg Oral DAILY Kendrick Acevedo M.D.   10 mg at 06/26/25 0508    montelukast (Singulair) tablet 10 mg  10 mg Oral QDAY Kendrick Acevedo M.D.   10 mg at 06/25/25 1951    rosuvastatin (Crestor) tablet 20 mg  20 mg Oral Q EVENING Kendrick  JUNO Acevedo   20 mg at 06/25/25 1951    LORazepam (Ativan) tablet 0.5 mg  0.5 mg Oral Q4HRS PRN Kendrick Acevedo M.D.        acetaminophen (Tylenol) tablet 650 mg  650 mg Oral Q6HRS Kendrick Acevedo M.D.   650 mg at 06/26/25 0509    Followed by    [START ON 6/30/2025] acetaminophen (Tylenol) tablet 650 mg  650 mg Oral Q6HRS PRN Kendrick Acevedo M.D.        methocarbamol (Robaxin) tablet 750 mg  750 mg Oral TID Kendrick Acevedo M.D.   750 mg at 06/26/25 0508

## 2025-06-26 NOTE — H&P
"Hospital Medicine History & Physical Note    Date of Service  6/25/2025    Primary Care Physician  Kita Miner M.D.    Consultants  GYN oncology (Dr. Lancaster)  ICU (Dr. Woods) - ok with CNU floor  IR    Code Status  Full Code    Chief Complaint  Chief Complaint   Patient presents with    Shortness of Breath     Pt reports recent diagnosis of \"fluid on lung\", taking ATB. Pt reports worsening SOB and cough.       History of Presenting Illness  Sheree Garcia is a 80 y.o. female with history of CAD, hyperlipidemia who presented 6/25/2025 with evaluation for shortness of breath.  Patient reported to have fluid in the lungs several days ago, as well as URI symptoms productive cough for the past several weeks.  She has been taking outpatient antibiotic without relief.  She presented to emergency room for evaluation.    In ER, CTA chest did not show PE, however notable large bilateral pleural effusions with associated atelectasis, as well as ascites concern for peritoneal tumor implants in upper abdomen.    Subsequent CT AP noted large 20 cm multiloculated cystic ovarian neoplasm in the mid pelvis, extensive peritoneal carcinomatosis with large volume ascites, partially visualized large bilateral pleural effusions malignant pleural effusions.    Patient initially required a brief period of BiPAP support, however successfully weaned down to 2 L nasal cannula support.  Case was discussed with ICU attending, diana with waiting until a.m. for thoracocentesis, hemodynamically stable at this time.  GYN oncology was consulted, formal evaluation to follow in AM.  Therefore, admitted to medicine service for further evaluation and treatment.    I discussed the plan of care with patient, family, bedside RN, charge RN, pharmacy, and critical care.    Review of Systems  Review of Systems   Constitutional:  Positive for chills and malaise/fatigue. Negative for fever.   HENT:  Negative for hearing loss and tinnitus.    Eyes: "  Negative for blurred vision and double vision.   Respiratory:  Positive for cough, sputum production and shortness of breath.    Cardiovascular:  Positive for orthopnea and leg swelling. Negative for chest pain and palpitations.   Gastrointestinal:  Negative for abdominal pain, heartburn, nausea and vomiting.   Genitourinary:  Positive for urgency. Negative for dysuria.   Musculoskeletal:  Positive for myalgias. Negative for joint pain.   Skin:  Negative for rash.   Neurological:  Positive for weakness. Negative for dizziness, focal weakness and headaches.   Endo/Heme/Allergies:  Negative for environmental allergies. Does not bruise/bleed easily.   Psychiatric/Behavioral:  Negative for depression and substance abuse.    All other systems reviewed and are negative.      Past Medical History   has a past medical history of Arthritis, CAD (coronary artery disease), Cataract, Heart burn, and Indigestion.    Surgical History   has a past surgical history that includes hysterectomy laparoscopy; other orthopedic surgery; other; and thyroid lobectomy (Left, 12/26/2018).     Family History  Family History   Problem Relation Age of Onset    Cancer Maternal Aunt         breast        Family history reviewed with patient. There is family history that is pertinent to the chief complaint.   FH lung cancer in family    Social History   reports that she has never smoked. She has never used smokeless tobacco. She reports current alcohol use.    Allergies  Allergies[1]    Medications  Prior to Admission Medications   Prescriptions Last Dose Informant Patient Reported? Taking?   Cyanocobalamin (VITAMIN B-12) 1000 MCG Tab  Patient Yes No   Sig: Take 1,000 mcg by mouth every day.   Misc Natural Products (GINSENG COMPLEX PO)  Patient Yes No   Sig: Take  by mouth every day.   Multiple Vitamins-Minerals (HAIR SKIN NAILS PO)  Patient Yes No   Sig: Take  by mouth.   Non Formulary Request  Patient Yes No   Sig: aller yoel   bid   Omega-3  Fatty Acids (FISH OIL) 1000 MG Cap capsule  Patient Yes No   Sig: Take 1,000 mg by mouth every day.   Turmeric 500 MG Cap  Patient Yes No   Sig: Take  by mouth every day.   Vitamin D, Cholecalciferol, 1000 units Cap  Patient Yes No   Sig: Take  by mouth every day.   albuterol 108 (90 Base) MCG/ACT Aero Soln inhalation aerosol   Yes No   Sig: albuterol sulfate HFA 90 mcg/actuation aerosol inhaler   Inhale 2 puffs every 6-8 hours by inhalation route.   amoxicillin (AMOXIL) 500 MG Cap   Yes No   aspirin 81 MG EC tablet   Yes No   Sig: Take 81 mg by mouth every day.   azithromycin (ZITHROMAX) 250 MG Tab   Yes No   Sig: azithromycin 250 mg tablet   fenofibrate (TRIGLIDE) 160 MG tablet  Patient Yes No   Sig: Take 160 mg by mouth every day.   ondansetron (ZOFRAN ODT) 4 MG TABLET DISPERSIBLE   No No   Sig: Take 1 Tab by mouth every 6 hours as needed for Nausea.      Facility-Administered Medications: None       Physical Exam  Temp:  [36.1 °C (97 °F)] 36.1 °C (97 °F)  Pulse:  [105-134] 123  Resp:  [14-35] 23  BP: (136-150)/(67-92) 141/75  SpO2:  [92 %-98 %] 96 %  Blood Pressure : 136/78   Temperature: 36.1 °C (97 °F)   Pulse: (!) 128   Respiration: (!) 28   Pulse Oximetry: 92 %       Physical Exam  Vitals and nursing note reviewed.   Constitutional:       General: She is not in acute distress.     Appearance: She is ill-appearing.   HENT:      Head: Normocephalic and atraumatic.      Nose: Nose normal.      Mouth/Throat:      Mouth: Mucous membranes are moist.      Pharynx: Oropharynx is clear.   Eyes:      General: No scleral icterus.     Extraocular Movements: Extraocular movements intact.   Cardiovascular:      Rate and Rhythm: Regular rhythm. Tachycardia present.      Pulses: Normal pulses.      Heart sounds:      No friction rub.   Pulmonary:      Effort: No respiratory distress.      Breath sounds: No stridor. Rales present. No wheezing.      Comments: Bibasilar crackles  Chest:      Chest wall: No tenderness.    Abdominal:      General: There is distension.      Tenderness: There is abdominal tenderness. There is no guarding or rebound.      Comments: Dullness to percussion   Musculoskeletal:         General: Swelling present. Normal range of motion.      Cervical back: Neck supple. No tenderness.      Right lower leg: Edema present.      Left lower leg: Edema present.   Skin:     General: Skin is warm and dry.      Capillary Refill: Capillary refill takes less than 2 seconds.   Neurological:      General: No focal deficit present.      Mental Status: She is alert and oriented to person, place, and time.   Psychiatric:         Mood and Affect: Mood normal.         Laboratory:  Recent Labs     06/25/25  1514   WBC 9.5   RBC 4.87   HEMOGLOBIN 12.4   HEMATOCRIT 38.4   MCV 78.9*   MCH 25.5*   MCHC 32.3   RDW 43.1   PLATELETCT 733*   MPV 9.0     Recent Labs     06/25/25  1514   SODIUM 133*   POTASSIUM 4.4   CHLORIDE 97   CO2 21   GLUCOSE 130*   BUN 8   CREATININE 0.48*   CALCIUM 9.0     Recent Labs     06/25/25  1514   ALTSGPT 32   ASTSGOT 46*   ALKPHOSPHAT 128*   TBILIRUBIN 0.3   GLUCOSE 130*     Recent Labs     06/25/25  1514   INR 1.12     Recent Labs     06/25/25  1514   NTPROBNP 284*         Recent Labs     06/25/25  1514   TROPONINT 12       Imaging:  CT-ABDOMEN-PELVIS WITH   Final Result      1.  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   2.  Extensive peritoneal carcinomatosis with large volume ascites.   3.  Partially visualized large bilateral pleural effusions. Malignant pleural effusions are possible.      CT-CTA CHEST PULMONARY ARTERY W/ RECONS   Final Result      1.  No CT evidence for pulmonary emboli.   2.  Large bilateral pleural effusions with associated atelectasis.   3.  Ascites with findings concerning for peritoneal tumor implants in the upper abdomen.               DX-CHEST-PORTABLE (1 VIEW)   Final Result      1.  Worsening pulmonary edema.   2.  Worsening bilateral pleural  effusions, larger on the RIGHT.   3.  No pneumothorax.      US-THORACENTESIS PUNCTURE RIGHT    (Results Pending)   US-THORACENTESIS PUNCTURE LEFT    (Results Pending)   US-PARACENTESIS, ABD WITH IMAGING    (Results Pending)   US-EXTREMITY VENOUS LOWER BILAT    (Results Pending)   EC-ECHOCARDIOGRAM COMPLETE W/O CONT    (Results Pending)                 X-Ray:  I have personally reviewed the images and compared with prior images.  EKG:  I have personally reviewed the images and compared with prior images.    Assessment/Plan:  Justification for Admission Status  I anticipate this patient will require at least 2 midnights of hospitalization, therefore appropriate for inpatient status.      * Peritoneal carcinomatosis (HCC)  Assessment & Plan  Suspect new onset malignancy  CTAP: Large 20 cm multiloculated cystic ovarian neoplasm in the mid pelvis, extensive peritoneal carcinomatosis with large volume ascites    Paracentesis ordered  Thoracocentesis ordered  Check tumor markers    GYN oncology consulted, formal evaluation to follow in a.m.    Malignant ascites  Assessment & Plan  Suspected  Paracentesis ordered    Malignant pleural effusion  Assessment & Plan  Large bilateral pleural effusions noted on CTA chest  Thought to be malignant etiology    Trial of diuresis  Bilateral thoracocentesis ordered    Bilateral leg edema  Assessment & Plan  Trial of diuresis  Check LE Doppler  ?CHF - check TTE    Cancer associated pain  Assessment & Plan  Supportive pain control    Hypertension  Assessment & Plan  IV lasix for now  Resume amlodipine when appropriate    CAD (coronary artery disease)  Assessment & Plan  Asa, statin, Zetia    Pneumonia due to infectious organism  Assessment & Plan  Concern for malignancy, large bilateral pleural effusions, as well as need for intra-abdominal coverage  Antibiotic: Zosyn  Follow culture    Acute respiratory failure with hypoxia (HCC)  Assessment & Plan  Initially required BiPAP  support-weaned down to 2 L  Likely secondary to volume overload  Paracentesis and thoracocentesis ordered  Trial of diuresis    ACP (advance care planning)  Assessment & Plan  Goal of care discussed with patient and patient's daughter at bedside in emergency room.  Patient stated she will like to focus on quality of life, does not wish to end up on life support.  I explained to patient that if she were to be intubated and be placed on mechanical ventilation --chances of her having good quality of life, eg, be able to talk/walk/speak may be futile given her age and concern for malignancy.  After discussion, patient agrees for DNR/DNI status-no CPR/defibrillation/intubation or mechanical ventilation.  She is however agreeable for hospitalization, evaluation by GYN oncology, possible chemotherapy or radiation therapy or surgical intervention if warranted.  Diagnosis, progress, question concern addressed.  ACP: 16minutes        VTE prophylaxis: SCDs/TEDs         [1] No Known Allergies

## 2025-06-26 NOTE — ASSESSMENT & PLAN NOTE
"I had a long conversation with patient and her daughter at bedside regarding her goals of care moving forward.  Patient is very agreeable with any surgical interventions that may be recommended.  However, she is requesting DNR/DNI status.  She does not wish to \"live on life support\".  She is agreeable to revoke her DNR/DNI status for surgery.  But then she request that it be reimplemented.  ACP time spent 20 minutes.    6/27: A long conversation with patient and her daughter at bedside regarding care moving forward.  At this time, she is agreeable to a 3 cycle treatment of chemotherapy.  She fully understands that she may not tolerate this well and at that time she may elect to transition to hospice.  We discussed different levels of hospice care.  For now, the plan is for patient to return home.  Her family is very supportive and already working on possible caregiver support for the future.  They would like to avoid any kind of skilled nursing facility or rehabilitation recommendations at this point.  Quality of life is their priority.  ACP time spent 20 minutes  "

## 2025-06-26 NOTE — PROGRESS NOTES
"Hospital Medicine Daily Progress Note    Date of Service  6/26/2025    Chief Complaint  Sheree Garcia is a 80 y.o. female admitted 6/25/2025 with worsening shortness of breath    Hospital Course  Sheree Garcia is an 80-year-old female with PMHx CAD, HLD.  Admitted 6/25 for shortness of breath.    Per history: Patient developed severe cough several days ago.  This has been associated with shortness of breath.  Patient states she was diagnosed with \"fluid on the lung\" a few days ago.  In the ED: CTA chest: Negative for PE.  Notable large bilateral pleural effusions associated with atelectasis.  CTA A/P: Extensive peritoneal carcinomatosis with large volume ascites.  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    Initially, patient was requiring BiPAP.  She was then successfully weaned to 2 L/min via nasal cannula.  ICU was consulted but in agreement with medical floor for now.  Patient will undergo thoracentesis this a.m.  Dr. Lancaster with gyn oncology was also consulted.     Interval Problem Update  6/26: Vitals notable for improvement in tachycardia.  Heart rate now in the 80s from 120's in the ED.  Tachypnea is also improving.  SBP ranging 121 through 141.  Patient is on 3 L/min supplemental O2.  WBC 7 from 9.  Hb 11 from 12.  Planning for thoracentesis today. Holding DVT prophylaxis while possible surgical planning occurs.     I have discussed this patient's plan of care and discharge plan at IDT rounds today with Case Management, Nursing, Nursing leadership, and other members of the IDT team.    Consultants/Specialty  critical care and gyn oncology     Code Status  DNAR/DNI    Disposition  The patient is not medically cleared for discharge to home or a post-acute facility.      I have placed the appropriate orders for post-discharge needs.    Review of Systems  Review of Systems   Constitutional:  Positive for malaise/fatigue. Negative for fever.   Respiratory:  Positive " for cough and shortness of breath.    Cardiovascular:  Positive for leg swelling. Negative for chest pain.   Gastrointestinal:  Positive for abdominal pain. Negative for nausea and vomiting.        Physical Exam  Temp:  [36.1 °C (97 °F)-36.8 °C (98.2 °F)] 36.7 °C (98 °F)  Pulse:  [] 96  Resp:  [14-35] 24  BP: (121-150)/(67-92) 145/89  SpO2:  [92 %-98 %] 93 %    Physical Exam  Vitals and nursing note reviewed.   Constitutional:       General: She is not in acute distress.     Appearance: Normal appearance. She is ill-appearing.   Cardiovascular:      Rate and Rhythm: Normal rate and regular rhythm.   Pulmonary:      Effort: Pulmonary effort is normal.      Breath sounds: Normal breath sounds.   Abdominal:      General: Bowel sounds are normal. There is distension.      Tenderness: There is no abdominal tenderness.      Comments: Significant abdominal distention is noted   Skin:     General: Skin is warm and dry.      Coloration: Skin is pale.   Neurological:      Mental Status: She is alert and oriented to person, place, and time.      Motor: Weakness present.   Psychiatric:         Mood and Affect: Mood normal.         Behavior: Behavior normal.         Fluids  No intake or output data in the 24 hours ending 06/26/25 1225     Laboratory  Recent Labs     06/25/25  1514 06/26/25  0531   WBC 9.5 7.3   RBC 4.87 4.72   HEMOGLOBIN 12.4 11.8*   HEMATOCRIT 38.4 38.1   MCV 78.9* 80.7*   MCH 25.5* 25.0*   MCHC 32.3 31.0*   RDW 43.1 44.5   PLATELETCT 733* 594*   MPV 9.0 8.9*     Recent Labs     06/25/25  1514 06/26/25  0531   SODIUM 133* 135   POTASSIUM 4.4 4.3   CHLORIDE 97 98   CO2 21 24   GLUCOSE 130* 104*   BUN 8 8   CREATININE 0.48* 0.54   CALCIUM 9.0 8.8     Recent Labs     06/25/25  1514   INR 1.12               Imaging  US-EXTREMITY VENOUS LOWER BILAT   Final Result      CT-ABDOMEN-PELVIS WITH   Final Result      1.  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   2.  Extensive peritoneal  carcinomatosis with large volume ascites.   3.  Partially visualized large bilateral pleural effusions. Malignant pleural effusions are possible.      CT-CTA CHEST PULMONARY ARTERY W/ RECONS   Final Result      1.  No CT evidence for pulmonary emboli.   2.  Large bilateral pleural effusions with associated atelectasis.   3.  Ascites with findings concerning for peritoneal tumor implants in the upper abdomen.               DX-CHEST-PORTABLE (1 VIEW)   Final Result      1.  Worsening pulmonary edema.   2.  Worsening bilateral pleural effusions, larger on the RIGHT.   3.  No pneumothorax.      US-THORACENTESIS PUNCTURE RIGHT    (Results Pending)   US-THORACENTESIS PUNCTURE LEFT    (Results Pending)   US-PARACENTESIS, ABD WITH IMAGING    (Results Pending)        Assessment/Plan  * Peritoneal carcinomatosis (HCC)  Assessment & Plan  Concerning for malignancy versus Meig's disease  CT A/P: Large 20 cm multiloculated cystic ovarian neoplasm in the mid pelvis, extensive peritoneal carcinomatosis with large volume ascites  - Dr. Lancaster has been consulted  - Follow recommendations for possible surgical planning versus biopsy versus other    Bilateral leg edema  Assessment & Plan  Lower extremity US: Negative for DVT  - Diuresis with Lasix 40 mg twice daily  - Close monitoring for hypotension and urinary output    Cancer associated pain  Assessment & Plan  Supportive pain control    Hypertension  Assessment & Plan  IV lasix for now  Resume amlodipine when appropriate    CAD (coronary artery disease)  Assessment & Plan  Followed by Dr. Coleman  No history of stent or CABG  - Holding ASA for possible surgery  - Continue statin and Zetia    ACP (advance care planning)  Assessment & Plan  I had a long conversation with patient and her daughter at bedside regarding her goals of care moving forward.  Patient is very agreeable with any surgical interventions that may be recommended.  However, she is requesting DNR/DNI status.  She does  "not wish to \"live on life support\".  She is agreeable to revoke her DNR/DNI status for surgery.  But then she request that it be reimplemented.  ACP time spent 20 minutes.    Pneumonia due to infectious organism  Assessment & Plan  Concern for malignancy, large bilateral pleural effusions, as well as need for intra-abdominal coverage  - Continuing IV Zosyn for possible superimposed bacterial pneumonia  - Continue monitor closely  - Follow thoracentesis fluid analysis     Acute respiratory failure with hypoxia (HCC)  Assessment & Plan  Secondary to bilateral pleural effusions  Initially requiring BiPAP in the ED  Currently on 2 L/min supplemental O2  - Wean oxygen as able  - Continuous pulse ox monitoring  - Thoracentesis is pending    Malignant ascites  Assessment & Plan  Paracentesis has been ordered, I reviewed her CT imaging-I do not believe there will be a large enough pocket for effective paracentesis.  We will continue monitor closely.    Malignant pleural effusion  Assessment & Plan  Large bilateral pleural effusions noted on CTA chest  Thought to be malignant etiology    Trial of diuresis  Bilateral thoracocentesis ordered         VTE prophylaxis:   SCDs/TEDs   pharmacologic prophylaxis contraindicated due to Surgical planning      I have performed a physical exam and reviewed and updated ROS and Plan today (6/26/2025). In review of yesterday's note (6/25/2025), there are no changes except as documented above.        "

## 2025-06-26 NOTE — DISCHARGE PLANNING
Care Transition Team Assessment    Patient is an 80 year old female admitted for peritoneal carcinomatosis. Please see patient's H&P for prior medical history. Patient is A/Ox4 and able to verify information on the face sheet. Patient lives alone in a 2 story house with 1 step to enter, Franc Garcia (p)[phone]; emergency contact and NOK. No Advanced directive on file. Patient reports, prior to admission patient is independent with ADL's and IADL's. Patient does not use any DME at baseline. Patient reports her son and daughters are good support for her. Patient receives about $7500/month from [employer] and [employer]. Patient's PCP is Ktia Miner. Patient's preferred pharmacy is [facility]. Patient denies a history of SNF/IPR nor HHC use in the past. Patient denies any SA or MH concerns. Patient confirmed medical coverage via Medicare and [employer]. Patient has means to transportation and once of her children will provide transport once medically stable for discharge.     Information Source  Orientation Level: Oriented X4  Information Given By: Patient  Informant's Name: Franc  Who is responsible for making decisions for patient? : Patient    Readmission Evaluation  Is this a readmission?: No    Elopement Risk  Legal Hold: No  Ambulatory or Self Mobile in Wheelchair: Yes  Disoriented: No  Psychiatric Symptoms: None  History of Wandering: No  Elopement this Admit: No  Vocalizing Wanting to Leave: No  Displays Behaviors, Body Language Wanting to Leave: No-Not at Risk for Elopement  Elopement Risk: Not at Risk for Elopement    Interdisciplinary Discharge Planning  Patient or legal guardian wants to designate a caregiver: Yes    Discharge Preparedness  What is your plan after discharge?: Home with help  What are your discharge supports?: Child  Prior Functional Level: Ambulatory, Independent with Activities of Daily Living, Independent with Medication Management  Difficulity with ADLs: None  Difficulity with IADLs:  None    Functional Assesment  Prior Functional Level: Ambulatory, Independent with Activities of Daily Living, Independent with Medication Management    Finances  Financial Barriers to Discharge: No  Prescription Coverage: Yes    Vision / Hearing Impairment  Vision Impairment : Yes  Right Eye Vision: Impaired, Wears Glasses  Left Eye Vision: Impaired, Wears Glasses  Hearing Impairment : Yes  Hearing Impairment: Both Ears  Does Pt Need Special Equipment for the Hearing Impaired?: Yes-But Does not Need for Facility to Arrange Equipment         Advance Directive  Advance Directive?: None  Advance Directive offered?: AD Booklet given    Domestic Abuse  Have you ever been the victim of abuse or violence?: No  Possible Abuse/Neglect Reported to:: Not Applicable    Psychological Assessment  History of Substance Abuse: None  History of Psychiatric Problems: No  Non-compliant with Treatment: No  Newly Diagnosed Illness: No    Discharge Risks or Barriers  Discharge risks or barriers?: No  Patient risk factors: Complex medical needs    Anticipated Discharge Information  Discharge Disposition: Discharged to home/self care (01)

## 2025-06-26 NOTE — DISCHARGE PLANNING
Post Acute Navigator Team    Patient screened based off of following information:    End of Life Care Index risk score 91  High LACE + score 64  6 click Mobility score Pending    6 click ADL score Pending   Readmission: NO       Per chart review, post acute needs to be determined based on hospital course.     Please reach out to me directly should care team feel patient will benefit from a discharge goals of care conversation regarding outpatient palliative care or hospice.

## 2025-06-26 NOTE — PROGRESS NOTES
4 Eyes Skin Assessment Completed by MAE Chang and MAE Mitchell.    Skin assessment is primarily focused on high risk bony prominences. Pay special attention to skin beneath and around medical devices, high risk bony prominences, skin to skin areas and areas where the patient lacks sensation to feel pain and areas where the patient previously had breakdown.     Head (Occipital):  WDL   Ears (Under Medical Devices): WDL   Nose (Under Medical Devices): WDL   Mouth:  WDL   Neck: WDL   Breast/Chest:  WDL   Shoulder Blades:  WDL   Spine:   WDL   (R) Arm/Elbow/Hand: Bruising   (L) Arm/Elbow/Hand: Bruising   Abdomen: WDL   Pannus/Groin:  WDL   Sacrum/Coccyx:   WDL   (R) Ischial Tuberosity (Sit Bones):  WDL   (L) Ischial Tuberosity (Sit Bones):  WDL   (R) Leg:  Edema   (L) Leg:  Edema   (R) Heel:  dry   (R) Foot/Toe: WDL   (L) Heel: dry   (L) Foot/Toe:  WDL       DEVICES IN USE:   Respiratory Devices:  Nasal cannula and Pulse ox  Feeding Devices:  N/A   Lines & BP Monitoring Devices:  Peripheral IV    Orthopedic Devices:  N/A  Miscellaneous Devices:  N/A    PROTOCOL INTERVENTIONS:   Standard/Trauma Bed:  Already in place  Condom Cath/Purewick:  Reinforced/reapplied    WOUND PHOTOS:   N/A no wounds identified    WOUND CONSULT:   N/A, no advanced wound care needs identified

## 2025-06-26 NOTE — ASSESSMENT & PLAN NOTE
Large bilateral pleural effusions noted on CTA chest  Thought to be malignant etiology    Trial of diuresis  Right thora 6/26 with 2L removed  Thoracentesis on 6/27 and ahd 1 L of fluid removed

## 2025-06-26 NOTE — ASSESSMENT & PLAN NOTE
Concerning for malignancy versus Meig's disease  CT A/P: Large 20 cm multiloculated cystic ovarian neoplasm in the mid pelvis, extensive peritoneal carcinomatosis with large volume ascites  - Dr. Lancaster following  - Patient agreeable to a short course of chemotherapy; she is planning 3 cycles.    Patient had 4 cycles of chemo on 6/28  Plan is to discharge the patient after PT/OT evaluation.

## 2025-06-26 NOTE — ASSESSMENT & PLAN NOTE
Concern for malignancy, large bilateral pleural effusions, as well as need for intra-abdominal coverage  - Discontinue IV Zosyn no obvious signs of infection or underlying bacterial pneumonia   - Continue monitor closely  - Follow thoracentesis fluid analysis

## 2025-06-26 NOTE — ASSESSMENT & PLAN NOTE
Lower extremity US: Negative for DVT  - Diuresis with Lasix   - Close monitoring for hypotension and urinary output

## 2025-06-26 NOTE — HOSPITAL COURSE
"Sheree Garcia is an 80-year-old female with PMHx CAD, HLD.  Admitted 6/25 for shortness of breath.    Per history: Patient developed severe cough several days ago.  This has been associated with shortness of breath.  Patient states she was diagnosed with \"fluid on the lung\" a few days ago.  In the ED: CTA chest: Negative for PE.  Notable large bilateral pleural effusions associated with atelectasis.  CTA A/P: Extensive peritoneal carcinomatosis with large volume ascites.  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    Initially, patient was requiring BiPAP.  She was then successfully weaned to 2 L/min via nasal cannula.         Dr. Lancaster with gyn oncology was also consulted.  During the stay in the hospital, patient did receive chemotherapy cycle  #1 of taxol/carbo/ Bevacizumab 6/28/2025.      This is a 80-year-old female with a past medical significant for CAD, hyperlipidemia was admitted on 6/25 with complaint of shortness of breath.  Patient stated that she was diagnosed fluid in her lungs a few days ago.  In ED, CTA chest negative for PE multiple large bilateral pleural effusion associated with atelectasis.  CTAP extensive peritoneal carcinomatosis with large volume ascites and large 20 cm multiloculated cystic ovarian neoplasm in the mid pelvis.    Because of her shortness of breath, and associated current fever, nausea on 2 to 3 L of oxygen.  During the stay in the hospital, patient had 1.7 L of fluid removed from the right side, 1 L from the left side of the lung and 2 L of of ascitic fluid.  Patient shortness of breath has gotten better, home O2 evaluation was obtained, patient will be discharged home on oxygen along with home health.  Of note given oncology continue to follow, patient did receive  #1 of taxol/carbo/ Bevacizumab 6/28/2025.    She will receive cycle 2 in 2 weeks at their office.  I have discussed the plan of care with the patient family in detail.  At this time " patient medically stable to be discharged home on oxygen.  For all other chronic medical condition, patient resume her home medication.

## 2025-06-27 ENCOUNTER — APPOINTMENT (OUTPATIENT)
Dept: RADIOLOGY | Facility: MEDICAL CENTER | Age: 80
DRG: 374 | End: 2025-06-27
Attending: EMERGENCY MEDICINE
Payer: MEDICARE

## 2025-06-27 ENCOUNTER — HOME HEALTH ADMISSION (OUTPATIENT)
Dept: HOME HEALTH SERVICES | Facility: HOME HEALTHCARE | Age: 80
End: 2025-06-27
Payer: MEDICARE

## 2025-06-27 ENCOUNTER — APPOINTMENT (OUTPATIENT)
Dept: RADIOLOGY | Facility: MEDICAL CENTER | Age: 80
DRG: 374 | End: 2025-06-27
Attending: STUDENT IN AN ORGANIZED HEALTH CARE EDUCATION/TRAINING PROGRAM
Payer: MEDICARE

## 2025-06-27 DIAGNOSIS — R19.09 OTHER INTRA-ABDOMINAL AND PELVIC SWELLING, MASS AND LUMP: ICD-10-CM

## 2025-06-27 DIAGNOSIS — R97.1 ELEVATED CANCER ANTIGEN 125 (CA 125): Primary | ICD-10-CM

## 2025-06-27 LAB
ALBUMIN SERPL BCP-MCNC: 2.7 G/DL (ref 3.2–4.9)
ALBUMIN/GLOB SERPL: 0.8 G/DL
ALP SERPL-CCNC: 105 U/L (ref 30–99)
ALT SERPL-CCNC: 21 U/L (ref 2–50)
AMYLASE FLD-CCNC: 23 U/L
ANION GAP SERPL CALC-SCNC: 13 MMOL/L (ref 7–16)
APPEARANCE FLD: NORMAL
AST SERPL-CCNC: 21 U/L (ref 12–45)
BACTERIA UR CULT: NORMAL
BILIRUB SERPL-MCNC: 0.3 MG/DL (ref 0.1–1.5)
BODY FLD TYPE: NORMAL
BUN SERPL-MCNC: 12 MG/DL (ref 8–22)
CALCIUM ALBUM COR SERPL-MCNC: 9.7 MG/DL (ref 8.5–10.5)
CALCIUM SERPL-MCNC: 8.7 MG/DL (ref 8.5–10.5)
CELLS FLD: 1
CHLORIDE SERPL-SCNC: 96 MMOL/L (ref 96–112)
CO2 SERPL-SCNC: 25 MMOL/L (ref 20–33)
COLOR FLD: YELLOW
CREAT SERPL-MCNC: 0.49 MG/DL (ref 0.5–1.4)
CYTOLOGY REG CYTOL: NORMAL
CYTOLOGY REG CYTOL: NORMAL
ERYTHROCYTE [DISTWIDTH] IN BLOOD BY AUTOMATED COUNT: 43.8 FL (ref 35.9–50)
FUNGUS SPEC CULT: NORMAL
FUNGUS SPEC FUNGUS STN: NORMAL
GFR SERPLBLD CREATININE-BSD FMLA CKD-EPI: 95 ML/MIN/1.73 M 2
GLOBULIN SER CALC-MCNC: 3.4 G/DL (ref 1.9–3.5)
GLUCOSE FLD-MCNC: 127 MG/DL
GLUCOSE SERPL-MCNC: 156 MG/DL (ref 65–99)
GRAM STN SPEC: NORMAL
HCT VFR BLD AUTO: 36.9 % (ref 37–47)
HGB BLD-MCNC: 11.4 G/DL (ref 12–16)
LDH FLD L TO P-CCNC: 217 U/L
LYMPHOCYTES NFR FLD: 72 %
MAGNESIUM SERPL-MCNC: 2.1 MG/DL (ref 1.5–2.5)
MCH RBC QN AUTO: 24.9 PG (ref 27–33)
MCHC RBC AUTO-ENTMCNC: 30.9 G/DL (ref 32.2–35.5)
MCV RBC AUTO: 80.6 FL (ref 81.4–97.8)
MONOS+MACROS NFR FLD MANUAL: 23 %
NEUTROPHILS NFR FLD: 4 %
NUC CELL # FLD: 3638 CELLS/UL
PH FLD: 8 [PH]
PLATELET # BLD AUTO: 516 K/UL (ref 164–446)
PMV BLD AUTO: 8.8 FL (ref 9–12.9)
POTASSIUM SERPL-SCNC: 3.7 MMOL/L (ref 3.6–5.5)
PROT FLD-MCNC: 3.8 G/DL
PROT SERPL-MCNC: 6.1 G/DL (ref 6–8.2)
RBC # BLD AUTO: 4.58 M/UL (ref 4.2–5.4)
RBC # FLD: NORMAL CELLS/UL
SIGNIFICANT IND 70042: NORMAL
SITE SITE: NORMAL
SODIUM SERPL-SCNC: 134 MMOL/L (ref 135–145)
SOURCE SOURCE: NORMAL
WBC # BLD AUTO: 7.8 K/UL (ref 4.8–10.8)

## 2025-06-27 PROCEDURE — A9270 NON-COVERED ITEM OR SERVICE: HCPCS | Performed by: STUDENT IN AN ORGANIZED HEALTH CARE EDUCATION/TRAINING PROGRAM

## 2025-06-27 PROCEDURE — 36415 COLL VENOUS BLD VENIPUNCTURE: CPT

## 2025-06-27 PROCEDURE — 700102 HCHG RX REV CODE 250 W/ 637 OVERRIDE(OP)

## 2025-06-27 PROCEDURE — 71045 X-RAY EXAM CHEST 1 VIEW: CPT

## 2025-06-27 PROCEDURE — 83986 ASSAY PH BODY FLUID NOS: CPT

## 2025-06-27 PROCEDURE — 80053 COMPREHEN METABOLIC PANEL: CPT

## 2025-06-27 PROCEDURE — 700111 HCHG RX REV CODE 636 W/ 250 OVERRIDE (IP): Mod: JZ | Performed by: STUDENT IN AN ORGANIZED HEALTH CARE EDUCATION/TRAINING PROGRAM

## 2025-06-27 PROCEDURE — 83735 ASSAY OF MAGNESIUM: CPT

## 2025-06-27 PROCEDURE — 89051 BODY FLUID CELL COUNT: CPT

## 2025-06-27 PROCEDURE — 87205 SMEAR GRAM STAIN: CPT | Mod: 91

## 2025-06-27 PROCEDURE — 770004 HCHG ROOM/CARE - ONCOLOGY PRIVATE *

## 2025-06-27 PROCEDURE — 700102 HCHG RX REV CODE 250 W/ 637 OVERRIDE(OP): Performed by: STUDENT IN AN ORGANIZED HEALTH CARE EDUCATION/TRAINING PROGRAM

## 2025-06-27 PROCEDURE — 99232 SBSQ HOSP IP/OBS MODERATE 35: CPT | Mod: 25 | Performed by: STUDENT IN AN ORGANIZED HEALTH CARE EDUCATION/TRAINING PROGRAM

## 2025-06-27 PROCEDURE — 87070 CULTURE OTHR SPECIMN AEROBIC: CPT

## 2025-06-27 PROCEDURE — 88112 CYTOPATH CELL ENHANCE TECH: CPT | Performed by: PATHOLOGY

## 2025-06-27 PROCEDURE — C1729 CATH, DRAINAGE: HCPCS

## 2025-06-27 PROCEDURE — 82150 ASSAY OF AMYLASE: CPT

## 2025-06-27 PROCEDURE — 88305 TISSUE EXAM BY PATHOLOGIST: CPT | Performed by: PATHOLOGY

## 2025-06-27 PROCEDURE — 84157 ASSAY OF PROTEIN OTHER: CPT

## 2025-06-27 PROCEDURE — 88305 TISSUE EXAM BY PATHOLOGIST: CPT | Mod: 26 | Performed by: PATHOLOGY

## 2025-06-27 PROCEDURE — 87015 SPECIMEN INFECT AGNT CONCNTJ: CPT

## 2025-06-27 PROCEDURE — 88112 CYTOPATH CELL ENHANCE TECH: CPT | Mod: 26 | Performed by: PATHOLOGY

## 2025-06-27 PROCEDURE — 85027 COMPLETE CBC AUTOMATED: CPT

## 2025-06-27 PROCEDURE — 0W9B3ZZ DRAINAGE OF LEFT PLEURAL CAVITY, PERCUTANEOUS APPROACH: ICD-10-PCS | Performed by: NURSE PRACTITIONER

## 2025-06-27 PROCEDURE — 99497 ADVNCD CARE PLAN 30 MIN: CPT | Performed by: STUDENT IN AN ORGANIZED HEALTH CARE EDUCATION/TRAINING PROGRAM

## 2025-06-27 PROCEDURE — 83615 LACTATE (LD) (LDH) ENZYME: CPT

## 2025-06-27 PROCEDURE — 82945 GLUCOSE OTHER FLUID: CPT

## 2025-06-27 PROCEDURE — 87102 FUNGUS ISOLATION CULTURE: CPT

## 2025-06-27 RX ORDER — ONDANSETRON 8 MG/1
8 TABLET, ORALLY DISINTEGRATING ORAL PRN
Status: CANCELLED | OUTPATIENT
Start: 2025-06-28

## 2025-06-27 RX ORDER — 0.9 % SODIUM CHLORIDE 0.9 %
10 VIAL (ML) INJECTION PRN
Status: CANCELLED | OUTPATIENT
Start: 2025-06-27

## 2025-06-27 RX ORDER — LORAZEPAM 2 MG/ML
0.5 INJECTION INTRAMUSCULAR PRN
Status: CANCELLED | OUTPATIENT
Start: 2025-06-28

## 2025-06-27 RX ORDER — SODIUM CHLORIDE 9 MG/ML
1000 INJECTION, SOLUTION INTRAVENOUS PRN
Status: CANCELLED | OUTPATIENT
Start: 2025-06-28

## 2025-06-27 RX ORDER — DIPHENHYDRAMINE HYDROCHLORIDE 50 MG/ML
25 INJECTION, SOLUTION INTRAMUSCULAR; INTRAVENOUS ONCE
Status: CANCELLED | OUTPATIENT
Start: 2025-06-28 | End: 2025-06-28

## 2025-06-27 RX ORDER — 0.9 % SODIUM CHLORIDE 0.9 %
10 VIAL (ML) INJECTION PRN
Status: CANCELLED | OUTPATIENT
Start: 2025-06-28

## 2025-06-27 RX ORDER — 0.9 % SODIUM CHLORIDE 0.9 %
3 VIAL (ML) INJECTION PRN
Status: CANCELLED | OUTPATIENT
Start: 2025-06-28

## 2025-06-27 RX ORDER — PALONOSETRON 0.05 MG/ML
0.25 INJECTION, SOLUTION INTRAVENOUS ONCE
Status: CANCELLED | OUTPATIENT
Start: 2025-06-28 | End: 2025-06-28

## 2025-06-27 RX ORDER — 0.9 % SODIUM CHLORIDE 0.9 %
VIAL (ML) INJECTION PRN
Status: CANCELLED | OUTPATIENT
Start: 2025-06-27

## 2025-06-27 RX ORDER — DIPHENHYDRAMINE HYDROCHLORIDE 50 MG/ML
25 INJECTION, SOLUTION INTRAMUSCULAR; INTRAVENOUS PRN
Status: CANCELLED | OUTPATIENT
Start: 2025-06-28

## 2025-06-27 RX ORDER — DEXAMETHASONE 4 MG/1
8 TABLET ORAL EVERY 12 HOURS
Status: COMPLETED | OUTPATIENT
Start: 2025-06-27 | End: 2025-06-28

## 2025-06-27 RX ORDER — ALBUTEROL SULFATE 5 MG/ML
2.5 SOLUTION RESPIRATORY (INHALATION) PRN
Status: CANCELLED | OUTPATIENT
Start: 2025-06-28

## 2025-06-27 RX ORDER — ONDANSETRON 2 MG/ML
8 INJECTION INTRAMUSCULAR; INTRAVENOUS PRN
Status: CANCELLED | OUTPATIENT
Start: 2025-06-28

## 2025-06-27 RX ORDER — MEPERIDINE HYDROCHLORIDE 25 MG/ML
25 INJECTION INTRAMUSCULAR; INTRAVENOUS; SUBCUTANEOUS PRN
Status: CANCELLED | OUTPATIENT
Start: 2025-06-28

## 2025-06-27 RX ORDER — 0.9 % SODIUM CHLORIDE 0.9 %
VIAL (ML) INJECTION PRN
Status: CANCELLED | OUTPATIENT
Start: 2025-06-28

## 2025-06-27 RX ORDER — LORAZEPAM 0.5 MG/1
0.5 TABLET ORAL PRN
Status: CANCELLED | OUTPATIENT
Start: 2025-06-28

## 2025-06-27 RX ORDER — PROCHLORPERAZINE MALEATE 10 MG
10 TABLET ORAL EVERY 6 HOURS PRN
Status: CANCELLED | OUTPATIENT
Start: 2025-06-28

## 2025-06-27 RX ORDER — EPINEPHRINE 1 MG/ML(1)
0.5 AMPUL (ML) INJECTION PRN
Status: CANCELLED | OUTPATIENT
Start: 2025-06-28

## 2025-06-27 RX ORDER — METHYLPREDNISOLONE SODIUM SUCCINATE 125 MG/2ML
125 INJECTION, POWDER, LYOPHILIZED, FOR SOLUTION INTRAMUSCULAR; INTRAVENOUS PRN
Status: CANCELLED | OUTPATIENT
Start: 2025-06-28

## 2025-06-27 RX ORDER — 0.9 % SODIUM CHLORIDE 0.9 %
3 VIAL (ML) INJECTION PRN
Status: CANCELLED | OUTPATIENT
Start: 2025-06-27

## 2025-06-27 RX ORDER — SODIUM CHLORIDE 9 MG/ML
INJECTION, SOLUTION INTRAVENOUS CONTINUOUS
Status: CANCELLED | OUTPATIENT
Start: 2025-06-28

## 2025-06-27 RX ORDER — ACETAMINOPHEN 325 MG/1
650 TABLET ORAL PRN
Status: CANCELLED | OUTPATIENT
Start: 2025-06-28

## 2025-06-27 RX ADMIN — OXYCODONE 5 MG: 5 TABLET ORAL at 13:01

## 2025-06-27 RX ADMIN — HYDROMORPHONE HYDROCHLORIDE 0.5 MG: 1 INJECTION, SOLUTION INTRAMUSCULAR; INTRAVENOUS; SUBCUTANEOUS at 17:12

## 2025-06-27 RX ADMIN — ROSUVASTATIN CALCIUM 20 MG: 20 TABLET, FILM COATED ORAL at 16:05

## 2025-06-27 RX ADMIN — DOCUSATE SODIUM 50 MG AND SENNOSIDES 8.6 MG 2 TABLET: 8.6; 5 TABLET, FILM COATED ORAL at 16:05

## 2025-06-27 RX ADMIN — METHOCARBAMOL 750 MG: 750 TABLET ORAL at 16:03

## 2025-06-27 RX ADMIN — METHOCARBAMOL 750 MG: 750 TABLET ORAL at 05:55

## 2025-06-27 RX ADMIN — OXYCODONE HYDROCHLORIDE 10 MG: 10 TABLET ORAL at 16:04

## 2025-06-27 RX ADMIN — FUROSEMIDE 40 MG: 10 INJECTION, SOLUTION INTRAVENOUS at 05:55

## 2025-06-27 RX ADMIN — OXYCODONE 5 MG: 5 TABLET ORAL at 21:39

## 2025-06-27 RX ADMIN — MONTELUKAST 10 MG: 10 TABLET, FILM COATED ORAL at 21:39

## 2025-06-27 RX ADMIN — METHOCARBAMOL 750 MG: 750 TABLET ORAL at 11:29

## 2025-06-27 RX ADMIN — DEXAMETHASONE 8 MG: 4 TABLET ORAL at 17:12

## 2025-06-27 RX ADMIN — HYDROMORPHONE HYDROCHLORIDE 0.5 MG: 1 INJECTION, SOLUTION INTRAMUSCULAR; INTRAVENOUS; SUBCUTANEOUS at 14:05

## 2025-06-27 RX ADMIN — EZETIMIBE 10 MG: 10 TABLET ORAL at 05:55

## 2025-06-27 RX ADMIN — ENOXAPARIN SODIUM 40 MG: 100 INJECTION SUBCUTANEOUS at 16:05

## 2025-06-27 SDOH — ECONOMIC STABILITY: TRANSPORTATION INSECURITY
IN THE PAST 12 MONTHS, HAS LACK OF RELIABLE TRANSPORTATION KEPT YOU FROM MEDICAL APPOINTMENTS, MEETINGS, WORK OR FROM GETTING THINGS NEEDED FOR DAILY LIVING?: NO

## 2025-06-27 SDOH — ECONOMIC STABILITY: TRANSPORTATION INSECURITY
IN THE PAST 12 MONTHS, HAS THE LACK OF TRANSPORTATION KEPT YOU FROM MEDICAL APPOINTMENTS OR FROM GETTING MEDICATIONS?: NO

## 2025-06-27 ASSESSMENT — PAIN DESCRIPTION - PAIN TYPE
TYPE: ACUTE PAIN
TYPE: ACUTE PAIN;SURGICAL PAIN
TYPE: ACUTE PAIN

## 2025-06-27 ASSESSMENT — ENCOUNTER SYMPTOMS
FEVER: 0
ABDOMINAL PAIN: 1
CONSTIPATION: 0
NAUSEA: 0
DIZZINESS: 0
SHORTNESS OF BREATH: 0
SHORTNESS OF BREATH: 1
MYALGIAS: 0
CHILLS: 0
DIARRHEA: 0
COUGH: 0
WEAKNESS: 0
VOMITING: 0

## 2025-06-27 ASSESSMENT — LIFESTYLE VARIABLES
HOW MANY TIMES IN THE PAST YEAR HAVE YOU HAD 5 OR MORE DRINKS IN A DAY: 0
EVER FELT BAD OR GUILTY ABOUT YOUR DRINKING: NO
EVER HAD A DRINK FIRST THING IN THE MORNING TO STEADY YOUR NERVES TO GET RID OF A HANGOVER: NO
ALCOHOL_USE: NO
AVERAGE NUMBER OF DAYS PER WEEK YOU HAVE A DRINK CONTAINING ALCOHOL: 0
CONSUMPTION TOTAL: INCOMPLETE
DOES PATIENT WANT TO STOP DRINKING: CANNOT ASSESS
HAVE YOU EVER FELT YOU SHOULD CUT DOWN ON YOUR DRINKING: NO
ON A TYPICAL DAY WHEN YOU DRINK ALCOHOL HOW MANY DRINKS DO YOU HAVE: 0

## 2025-06-27 ASSESSMENT — SOCIAL DETERMINANTS OF HEALTH (SDOH)
IN THE PAST 12 MONTHS, HAS THE ELECTRIC, GAS, OIL, OR WATER COMPANY THREATENED TO SHUT OFF SERVICE IN YOUR HOME?: NO
WITHIN THE PAST 12 MONTHS, YOU WORRIED THAT YOUR FOOD WOULD RUN OUT BEFORE YOU GOT THE MONEY TO BUY MORE: NEVER TRUE
WITHIN THE PAST 12 MONTHS, THE FOOD YOU BOUGHT JUST DIDN'T LAST AND YOU DIDN'T HAVE MONEY TO GET MORE: NEVER TRUE

## 2025-06-27 NOTE — CARE PLAN
The patient is Watcher - Medium risk of patient condition declining or worsening    Shift Goals  Clinical Goals: pain control  Patient Goals: pain control, rest  Family Goals: Updates on POC    Progress made toward(s) clinical / shift goals:        Problem: Knowledge Deficit - Standard  Goal: Patient and family/care givers will demonstrate understanding of plan of care, disease process/condition, diagnostic tests and medications  Outcome: Progressing  Note: Patient is AxO x4 and understands plan of care, all questions answered at this time. Call light and personal belongings are within reach. Pt calls appropriately for nursing needs. Frequent rounding in place. Bed is locked and in lowest position.      Problem: Pain - Standard  Goal: Alleviation of pain or a reduction in pain to the patient’s comfort goal  Outcome: Progressing  Note: Pt rates pain using 1-10 pain scale. Pt medicated per mar. Pt requests pain medication appropriately. Patients pain reassessed within 1-2 hours of giving pain medication. Pt educated on non-pharmacological techniques to decrease pain.    Pt received pain medication once throughout the night.        Patient is not progressing towards the following goals:

## 2025-06-27 NOTE — CARE PLAN
The patient is Watcher - Medium risk of patient condition declining or worsening    Shift Goals  Clinical Goals: Pain control, complete throacentesis today  Patient Goals: Pain control  Family Goals: Updates on POC    Progress made toward(s) clinical / shift goals:  Patient completed left thoracentesis today. Patient reports pain as controlled through the shift. Patient and family updated on POC with no additional questions at this time.

## 2025-06-27 NOTE — PROGRESS NOTES
"Hospital Medicine Daily Progress Note    Date of Service  6/27/2025    Chief Complaint  Sheree Garcia is a 80 y.o. female admitted 6/25/2025 with worsening shortness of breath    Hospital Course  Sheree Garcia is an 80-year-old female with PMHx CAD, HLD.  Admitted 6/25 for shortness of breath.    Per history: Patient developed severe cough several days ago.  This has been associated with shortness of breath.  Patient states she was diagnosed with \"fluid on the lung\" a few days ago.  In the ED: CTA chest: Negative for PE.  Notable large bilateral pleural effusions associated with atelectasis.  CTA A/P: Extensive peritoneal carcinomatosis with large volume ascites.  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    Initially, patient was requiring BiPAP.  She was then successfully weaned to 2 L/min via nasal cannula.  ICU was consulted but in agreement with medical floor for now.  Patient will undergo thoracentesis this a.m.  Dr. Lancaster with gyn oncology was also consulted.     Interval Problem Update  6/26: Vitals notable for improvement in tachycardia.  Heart rate now in the 80s from 120's in the ED.  Tachypnea is also improving.  SBP ranging 121 through 141.  Patient is on 3 L/min supplemental O2.  WBC 7 from 9.  Hb 11 from 12.  Planning for thoracentesis today. Holding DVT prophylaxis while possible surgical planning occurs.     6/27: Vitals notable for persistent tachycardia.  Heart rate ranging .  Tachypnea is present.   through 145.  Patient remains on 3 L/min supplemental O2.   Discussed with HÉCTOR Minaya.  No surgical interventions are planned at this time.  Patient agreeable with neoadjuvant chemotherapy.  Planning to start tomorrow.  1 day course.    I have discussed this patient's plan of care and discharge plan at IDT rounds today with Case Management, Nursing, Nursing leadership, and other members of the IDT team.    Consultants/Specialty  critical " care and gyn oncology     Code Status  DNAR/DNI    Disposition  The patient is not medically cleared for discharge to home or a post-acute facility.  Anticipate discharge to: home with organized home healthcare and close outpatient follow-up    I have placed the appropriate orders for post-discharge needs.    Review of Systems  Review of Systems   Constitutional:  Positive for malaise/fatigue. Negative for fever.   Respiratory:  Positive for shortness of breath. Negative for cough.    Cardiovascular:  Positive for leg swelling. Negative for chest pain.   Gastrointestinal:  Positive for abdominal pain. Negative for nausea and vomiting.        Physical Exam  Temp:  [36.3 °C (97.3 °F)-37.4 °C (99.4 °F)] 36.3 °C (97.3 °F)  Pulse:  [] 89  Resp:  [20-30] 20  BP: (123-143)/(71-76) 123/71  SpO2:  [92 %-94 %] 94 %    Physical Exam  Vitals and nursing note reviewed.   Constitutional:       General: She is not in acute distress.     Appearance: Normal appearance. She is ill-appearing.   Cardiovascular:      Rate and Rhythm: Normal rate and regular rhythm.   Pulmonary:      Effort: Pulmonary effort is normal.      Breath sounds: Normal breath sounds.   Abdominal:      General: Bowel sounds are normal. There is distension.      Tenderness: There is no abdominal tenderness.      Comments: Moderate abdominal distention is noted   Skin:     General: Skin is warm and dry.      Coloration: Skin is pale.   Neurological:      Mental Status: She is alert and oriented to person, place, and time.      Motor: Weakness present.   Psychiatric:         Mood and Affect: Mood normal.         Behavior: Behavior normal.         Fluids  No intake or output data in the 24 hours ending 06/27/25 1205     Laboratory  Recent Labs     06/25/25  1514 06/26/25  0531 06/27/25  0636   WBC 9.5 7.3 7.8   RBC 4.87 4.72 4.58   HEMOGLOBIN 12.4 11.8* 11.4*   HEMATOCRIT 38.4 38.1 36.9*   MCV 78.9* 80.7* 80.6*   MCH 25.5* 25.0* 24.9*   MCHC 32.3 31.0* 30.9*    RDW 43.1 44.5 43.8   PLATELETCT 733* 594* 516*   MPV 9.0 8.9* 8.8*     Recent Labs     06/25/25  1514 06/26/25  0531 06/27/25  0636   SODIUM 133* 135 134*   POTASSIUM 4.4 4.3 3.7   CHLORIDE 97 98 96   CO2 21 24 25   GLUCOSE 130* 104* 156*   BUN 8 8 12   CREATININE 0.48* 0.54 0.49*   CALCIUM 9.0 8.8 8.7     Recent Labs     06/25/25  1514   INR 1.12               Imaging  DX-CHEST-PORTABLE (1 VIEW)   Final Result      1.  Interval improvement of LEFT pleural fluid.   2.  No pneumothorax.   3.  Persistent hypoinflation with bibasilar atelectasis.      DX-CHEST-PORTABLE (1 VIEW)   Final Result      1.  Extensive left perihilar infiltrate consistent with pneumonitis.   2.  Small left pleural effusion with left lateral pleural thickening.      US-PARACENTESIS, ABD WITH IMAGING   Final Result      1. Ultrasound-guided therapeutic and diagnostic paracentesis of the left lower quadrant of the abdominal wall.      2. 2050 mL of fluid withdrawn.      US-THORACENTESIS PUNCTURE RIGHT   Final Result      1. Ultrasound guided right sided therapeutic and diagnostic thoracentesis.      2. 1750 mL of fluid withdrawn.      US-EXTREMITY VENOUS LOWER BILAT   Final Result      CT-ABDOMEN-PELVIS WITH   Final Result      1.  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   2.  Extensive peritoneal carcinomatosis with large volume ascites.   3.  Partially visualized large bilateral pleural effusions. Malignant pleural effusions are possible.      CT-CTA CHEST PULMONARY ARTERY W/ RECONS   Final Result      1.  No CT evidence for pulmonary emboli.   2.  Large bilateral pleural effusions with associated atelectasis.   3.  Ascites with findings concerning for peritoneal tumor implants in the upper abdomen.               DX-CHEST-PORTABLE (1 VIEW)   Final Result      1.  Worsening pulmonary edema.   2.  Worsening bilateral pleural effusions, larger on the RIGHT.   3.  No pneumothorax.      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 Lancaster following  - Patient agreeable to a short course of chemotherapy; she is planning 3 cycles.  Planning to start first cycle tomorrow inpatient.  Anticipate discharge home after  - Follow recommendations for possible surgical planning versus biopsy versus other    Bilateral leg edema  Assessment & Plan  Lower extremity US: Negative for DVT  - Diuresis with Lasix 40 mg twice daily  - Close monitoring for hypotension and urinary output    Cancer associated pain  Assessment & Plan  Supportive pain control    Hypertension  Assessment & Plan  IV lasix for now  Resume amlodipine when appropriate    CAD (coronary artery disease)  Assessment & Plan  Followed by Dr. Coleman  No history of stent or CABG  - Holding ASA for possible surgery  - Continue statin and Zetia    ACP (advance care planning)  Assessment & Plan  I had a long conversation with patient and her daughter at bedside regarding her goals of care moving forward.  Patient is very agreeable with any surgical interventions that may be recommended.  However, she is requesting DNR/DNI status.  She does not wish to \"live on life support\".  She is agreeable to revoke her DNR/DNI status for surgery.  But then she request that it be reimplemented.  ACP time spent 20 minutes.    6/27: A long conversation with patient and her daughter at bedside regarding care moving forward.  At this time, she is agreeable to a 3 cycle treatment of chemotherapy.  She fully understands that she may not tolerate this well and at that time she may elect to transition to hospice.  We discussed different levels of hospice care.  For now, the plan is for patient to return home.  Her family is very " supportive and already working on possible caregiver support for the future.  They would like to avoid any kind of skilled nursing facility or rehabilitation recommendations at this point.  Quality of life is their priority.  ACP time spent 20 minutes    Pneumonia due to infectious organism  Assessment & Plan  Concern for malignancy, large bilateral pleural effusions, as well as need for intra-abdominal coverage  - Discontinue IV Zosyn no obvious signs of infection or underlying bacterial pneumonia   - Continue monitor closely  - Follow thoracentesis fluid analysis     Acute respiratory failure with hypoxia (HCC)  Assessment & Plan  Secondary to bilateral pleural effusions  Initially requiring BiPAP in the ED  Currently on 2 L/min supplemental O2  - Wean oxygen as able  - Continuous pulse ox monitoring  - Anticipate discharge with home oxygen; home O2 evaluation pending for today    Malignant ascites  Assessment & Plan  Paracentesis 6/26 with 2.7L removed    Malignant pleural effusion  Assessment & Plan  Large bilateral pleural effusions noted on CTA chest  Thought to be malignant etiology    Trial of diuresis  Right thora 6/26 with 2L removed  Left thora planned for 6/27         VTE prophylaxis:   SCDs/TEDs   enoxaparin ppx      I have performed a physical exam and reviewed and updated ROS and Plan today (6/27/2025). In review of yesterday's note (6/26/2025), there are no changes except as documented above.

## 2025-06-27 NOTE — DISCHARGE PLANNING
Received Choice form at 2245  Agency/Facility Name: [facility]  Referral sent per Choice form @ [id number]

## 2025-06-27 NOTE — DISCHARGE PLANNING
Case Management Discharge Planning    Admission Date: 6/25/2025  GMLOS: 5.6  ALOS: 2    6-Clicks ADL Score:    6-Clicks Mobility Score:        Anticipated Discharge Dispo: Discharge Disposition: Discharged to home/self care (01)    DME Needed: No    Action(s) Taken: Patient was discussed in IDT rounds, patient will receives chemotherapy inpatient tomorrow. RNCM requested a walking home O2 eval. RNCM obtained DME choice for [facility] for wheelchair and oxygen. RNCM requested a wheelchair order and fww order. RNCM obtained HH choice 1)[facility] 2)[facility] 3)[facility] 4)[facility] and faxed to [facility].    Escalations Completed: None    Medically Clear: No    Next Steps: pending medical clearance     Barriers to Discharge: Medical clearance    Is the patient up for discharge tomorrow: No

## 2025-06-27 NOTE — PROGRESS NOTES
"Gynecologic Oncology Progress Note    Author: Roxann Meyer P.A.-C.    Date/Time: 6/27/2025 2:22 PM    Date of Admit: 6/25/2025    HD#: 2     Interval History: Pt doing ok today. She has decided to proceed with neoadjuvant chemotherapy. She is wondering the frequency of chemotherapy and the logistics of chemo. Daughter at bedside.       Review of Systems   Constitutional:  Negative for chills and fever.   Respiratory:  Negative for shortness of breath.    Cardiovascular:  Negative for chest pain and leg swelling.   Gastrointestinal:  Positive for abdominal pain. Negative for constipation, diarrhea, nausea and vomiting.   Genitourinary:  Negative for dysuria and hematuria.   Musculoskeletal:  Negative for myalgias.   Neurological:  Negative for dizziness and weakness.       Allergies[1]       Current Medications[2]       Objective:     /71   Pulse 89   Temp 36.3 °C (97.3 °F) (Temporal)   Resp 16   Ht 1.626 m (5' 4\")   Wt 78 kg (172 lb)   SpO2 94%     Physical Exam  Constitutional:       General: She is not in acute distress.  Cardiovascular:      Rate and Rhythm: Normal rate.      Pulses: Normal pulses.   Pulmonary:      Effort: Pulmonary effort is normal.   Abdominal:      General: Abdomen is flat. There is no distension.      Palpations: Abdomen is soft.   Skin:     General: Skin is warm and dry.   Neurological:      Mental Status: She is alert.              Recent Labs     06/25/25  1514 06/26/25  0531 06/27/25  0636   WBC 9.5 7.3 7.8   RBC 4.87 4.72 4.58   HEMOGLOBIN 12.4 11.8* 11.4*   HEMATOCRIT 38.4 38.1 36.9*   MCV 78.9* 80.7* 80.6*   MCH 25.5* 25.0* 24.9*   MCHC 32.3 31.0* 30.9*   RDW 43.1 44.5 43.8   PLATELETCT 733* 594* 516*   MPV 9.0 8.9* 8.8*     Recent Labs     06/25/25  1514 06/26/25  0531 06/27/25  0636   SODIUM 133* 135 134*   POTASSIUM 4.4 4.3 3.7   CHLORIDE 97 98 96   CO2 21 24 25   GLUCOSE 130* 104* 156*   BUN 8 8 12   CREATININE 0.48* 0.54 0.49*   CALCIUM 9.0 8.8 8.7     Recent " Labs     06/25/25  1514 06/26/25  0531 06/27/25  0636   ASTSGOT 46* 39 21   ALTSGPT 32 31 21   TBILIRUBIN 0.3 0.3 0.3   ALKPHOSPHAT 128* 116* 105*   GLOBULIN 3.5 3.4 3.4   INR 1.12  --   --      Recent Labs     06/25/25  1514   INR 1.12           Assessment and Plan: This is a 80 y.o. female who presents with shortness of breath found to have large bilateral pleural effusions and a 20 cm ovarian neoplasm on CT.      #Pelvic mass: 20 cm multiloculated cystic and associated with peritoneal carcinomatosis and large volume ascites.  Patient also noted to have large pleural effusions.   - 1392  -Dr. Lancaster previously reviewed treatment options including doing nothing and opting for symptom management with hospice or  neoadjuvant chemotherapy followed by interval cytoreductive surgery versus initial debulking if patient is severely symptomatic/in pain.  As patient currently denies severe pain symptoms, he would recommend against proceeding with surgery upfront.  Patient has chosen to proceed with neoadjuvant chemotherapy consisting of taxol/carbo/bevacizumab  -Risks, benefits, timeframe of chemotherapy cycles, number of cycles, nausea regimen, lab testing, and office location discussed with pt in detail.   -Plan to proceed with cycle #1 of taxol/carbo/simba tomorrow, 6/28 while admitted  -Will have follow-up with our office in 3 weeks prior to cycle #2     #Pleural effusions: Most likely secondary to underlying malignancy.   -S/p right thoracentesis 6/26 with 1750 ml removed  -Plan for left thoracentesis  -Follow cytology     #History of CAD: Follows with Dr. Pinzon for cardiology. On ASA at home.     This case was discussed with Dr. Tonny Meyer P.A.-C.  Gynecology Oncology  [facility]          [1] No Known Allergies  [2]   Current Facility-Administered Medications:     acetaminophen (Tylenol) tablet 650 mg, 650 mg, Oral, Q4HRS PRN, Ramona Sommer M.D.    enoxaparin (Lovenox) inj 40 mg, 40 mg,  Subcutaneous, DAILY AT 1800, Ramona Sommer M.D., 40 mg at 06/26/25 1800    labetalol (Normodyne/Trandate) injection 10 mg, 10 mg, Intravenous, Q4HRS PRN, Kendrick Acevedo M.D.    ondansetron (Zofran) syringe/vial injection 4 mg, 4 mg, Intravenous, Q4HRS PRN **OR** ondansetron (Zofran ODT) dispertab 4 mg, 4 mg, Oral, Q4HRS PRN, Kendrick Acevedo M.D.    guaiFENesin dextromethorphan (Robitussin DM) 100-10 MG/5ML syrup 10 mL, 10 mL, Oral, Q6HRS PRN, Kendrick Acevedo M.D.    senna-docusate (Pericolace Or Senokot S) 8.6-50 MG per tablet 2 Tablet, 2 Tablet, Oral, Q EVENING, 2 Tablet at 06/26/25 1605 **AND** polyethylene glycol/lytes (Miralax) Packet 1 Packet, 1 Packet, Oral, QDAY PRN, Kendrick Acevedo M.D.    ipratropium-albuterol (DUONEB) nebulizer solution, 3 mL, Nebulization, Q4H PRN (RT), Kendrick Acevedo M.D.    oxyCODONE immediate-release (Roxicodone) tablet 5 mg, 5 mg, Oral, Q3HRS PRN, 5 mg at 06/27/25 1301 **OR** oxyCODONE immediate release (Roxicodone) tablet 10 mg, 10 mg, Oral, Q3HRS PRN **OR** HYDROmorphone (Dilaudid) injection 0.5 mg, 0.5 mg, Intravenous, Q3HRS PRN, Kendrick Acevedo M.D., 0.5 mg at 06/27/25 1405    albuterol inhaler 2 Puff, 2 Puff, Inhalation, Q4H PRN (RT), Kendrick Acevedo M.D.    amLODIPine (Norvasc) tablet 5 mg, 5 mg, Oral, DAILY, Ramona Sommer M.D.    aspirin EC tablet 81 mg, 81 mg, Oral, DAILY, Ramona Sommer M.D.    ezetimibe (Zetia) tablet 10 mg, 10 mg, Oral, DAILY, Kendrick Acevedo M.D., 10 mg at 06/27/25 0555    montelukast (Singulair) tablet 10 mg, 10 mg, Oral, QDAY, Kendrick Acevedo M.D., 10 mg at 06/26/25 2141    rosuvastatin (Crestor) tablet 20 mg, 20 mg, Oral, Q EVENING, Kendrick Acevedo M.D., 20 mg at 06/26/25 1605    LORazepam (Ativan) tablet 0.5 mg, 0.5 mg, Oral, Q4HRS PRN, Kendrick Acevedo M.D.    methocarbamol (Robaxin) tablet 750 mg, 750 mg, Oral, TID, Kendrick Acevedo M.D., 750 mg at 06/27/25 1129

## 2025-06-27 NOTE — DISCHARGE PLANNING
ATTN: Case Management  RE: Referral for Home Health    As of 06/27/2025, we have accepted the Home Health referral for the patient listed above.    [facility]  will contact the patient within 48 hours. If you have any questions or concerns regarding the patient's transition to Home Health, please do not hesitate to contact us at [phone].      We look forward to collaborating with you,  [facility] Team

## 2025-06-28 PROBLEM — Z71.89 ACP (ADVANCE CARE PLANNING): Status: RESOLVED | Noted: 2025-06-25 | Resolved: 2025-06-28

## 2025-06-28 LAB
ANION GAP SERPL CALC-SCNC: 12 MMOL/L (ref 7–16)
APPEARANCE UR: CLEAR
BILIRUB FLD-MCNC: 0.4 MG/DL
BILIRUB UR QL STRIP.AUTO: NEGATIVE
BUN SERPL-MCNC: 15 MG/DL (ref 8–22)
CALCIUM SERPL-MCNC: 9 MG/DL (ref 8.5–10.5)
CHLORIDE SERPL-SCNC: 95 MMOL/L (ref 96–112)
CO2 SERPL-SCNC: 26 MMOL/L (ref 20–33)
COLOR UR: YELLOW
CREAT SERPL-MCNC: 0.54 MG/DL (ref 0.5–1.4)
ERYTHROCYTE [DISTWIDTH] IN BLOOD BY AUTOMATED COUNT: 43.6 FL (ref 35.9–50)
FUNGUS SPEC CULT: NORMAL
FUNGUS SPEC FUNGUS STN: NORMAL
GFR SERPLBLD CREATININE-BSD FMLA CKD-EPI: 93 ML/MIN/1.73 M 2
GLUCOSE SERPL-MCNC: 151 MG/DL (ref 65–99)
GLUCOSE UR STRIP.AUTO-MCNC: NEGATIVE MG/DL
HCT VFR BLD AUTO: 37.6 % (ref 37–47)
HGB BLD-MCNC: 11.7 G/DL (ref 12–16)
KETONES UR STRIP.AUTO-MCNC: NEGATIVE MG/DL
LEUKOCYTE ESTERASE UR QL STRIP.AUTO: NEGATIVE
MAGNESIUM SERPL-MCNC: 2.1 MG/DL (ref 1.5–2.5)
MCH RBC QN AUTO: 24.9 PG (ref 27–33)
MCHC RBC AUTO-ENTMCNC: 31.1 G/DL (ref 32.2–35.5)
MCV RBC AUTO: 80.2 FL (ref 81.4–97.8)
MICRO URNS: NORMAL
NITRITE UR QL STRIP.AUTO: NEGATIVE
PH UR STRIP.AUTO: 5.5 [PH] (ref 5–8)
PLATELET # BLD AUTO: 448 K/UL (ref 164–446)
PMV BLD AUTO: 9.2 FL (ref 9–12.9)
POTASSIUM SERPL-SCNC: 4.4 MMOL/L (ref 3.6–5.5)
PRELIMINARY RPT Q0601: NORMAL
PROT UR QL STRIP: NEGATIVE MG/DL
RBC # BLD AUTO: 4.69 M/UL (ref 4.2–5.4)
RBC UR QL AUTO: NEGATIVE
RHODAMINE-AURAMINE STN SPEC: NORMAL
SIGNIFICANT IND 70042: NORMAL
SITE SITE: NORMAL
SODIUM SERPL-SCNC: 133 MMOL/L (ref 135–145)
SOURCE SOURCE: NORMAL
SP GR UR STRIP.AUTO: 1.02
SPECIMEN SOURCE: NORMAL
UROBILINOGEN UR STRIP.AUTO-MCNC: 0.2 EU/DL
WBC # BLD AUTO: 7 K/UL (ref 4.8–10.8)

## 2025-06-28 PROCEDURE — 700111 HCHG RX REV CODE 636 W/ 250 OVERRIDE (IP): Mod: JZ | Performed by: STUDENT IN AN ORGANIZED HEALTH CARE EDUCATION/TRAINING PROGRAM

## 2025-06-28 PROCEDURE — 700111 HCHG RX REV CODE 636 W/ 250 OVERRIDE (IP): Mod: JZ | Performed by: SPECIALIST

## 2025-06-28 PROCEDURE — A9270 NON-COVERED ITEM OR SERVICE: HCPCS | Performed by: STUDENT IN AN ORGANIZED HEALTH CARE EDUCATION/TRAINING PROGRAM

## 2025-06-28 PROCEDURE — 700102 HCHG RX REV CODE 250 W/ 637 OVERRIDE(OP): Performed by: STUDENT IN AN ORGANIZED HEALTH CARE EDUCATION/TRAINING PROGRAM

## 2025-06-28 PROCEDURE — 700105 HCHG RX REV CODE 258

## 2025-06-28 PROCEDURE — 80048 BASIC METABOLIC PNL TOTAL CA: CPT

## 2025-06-28 PROCEDURE — 99233 SBSQ HOSP IP/OBS HIGH 50: CPT | Performed by: HOSPITALIST

## 2025-06-28 PROCEDURE — 700111 HCHG RX REV CODE 636 W/ 250 OVERRIDE (IP)

## 2025-06-28 PROCEDURE — 85027 COMPLETE CBC AUTOMATED: CPT

## 2025-06-28 PROCEDURE — 770004 HCHG ROOM/CARE - ONCOLOGY PRIVATE *

## 2025-06-28 PROCEDURE — 700102 HCHG RX REV CODE 250 W/ 637 OVERRIDE(OP)

## 2025-06-28 PROCEDURE — 83735 ASSAY OF MAGNESIUM: CPT

## 2025-06-28 PROCEDURE — 81003 URINALYSIS AUTO W/O SCOPE: CPT

## 2025-06-28 PROCEDURE — 36415 COLL VENOUS BLD VENIPUNCTURE: CPT

## 2025-06-28 RX ORDER — DIPHENHYDRAMINE HYDROCHLORIDE 50 MG/ML
25 INJECTION, SOLUTION INTRAMUSCULAR; INTRAVENOUS PRN
OUTPATIENT
Start: 2025-06-28

## 2025-06-28 RX ORDER — PROCHLORPERAZINE MALEATE 10 MG
10 TABLET ORAL EVERY 6 HOURS PRN
OUTPATIENT
Start: 2025-06-28

## 2025-06-28 RX ORDER — ALBUTEROL SULFATE 5 MG/ML
2.5 SOLUTION RESPIRATORY (INHALATION) PRN
OUTPATIENT
Start: 2025-06-28

## 2025-06-28 RX ORDER — 0.9 % SODIUM CHLORIDE 0.9 %
VIAL (ML) INJECTION PRN
Status: CANCELLED | OUTPATIENT
Start: 2025-06-28

## 2025-06-28 RX ORDER — LORAZEPAM 0.5 MG/1
0.5 TABLET ORAL PRN
OUTPATIENT
Start: 2025-06-28

## 2025-06-28 RX ORDER — ONDANSETRON 4 MG/1
4 TABLET, ORALLY DISINTEGRATING ORAL EVERY 6 HOURS
Status: DISCONTINUED | OUTPATIENT
Start: 2025-06-29 | End: 2025-06-30 | Stop reason: HOSPADM

## 2025-06-28 RX ORDER — DEXAMETHASONE 4 MG/1
4 TABLET ORAL EVERY 6 HOURS
Status: DISCONTINUED | OUTPATIENT
Start: 2025-06-29 | End: 2025-06-30 | Stop reason: HOSPADM

## 2025-06-28 RX ORDER — 0.9 % SODIUM CHLORIDE 0.9 %
10 VIAL (ML) INJECTION PRN
OUTPATIENT
Start: 2025-06-28

## 2025-06-28 RX ORDER — MEPERIDINE HYDROCHLORIDE 25 MG/ML
25 INJECTION INTRAMUSCULAR; INTRAVENOUS; SUBCUTANEOUS PRN
OUTPATIENT
Start: 2025-06-28

## 2025-06-28 RX ORDER — 0.9 % SODIUM CHLORIDE 0.9 %
VIAL (ML) INJECTION PRN
OUTPATIENT
Start: 2025-06-28

## 2025-06-28 RX ORDER — LORAZEPAM 2 MG/ML
0.5 INJECTION INTRAMUSCULAR PRN
OUTPATIENT
Start: 2025-06-28

## 2025-06-28 RX ORDER — 0.9 % SODIUM CHLORIDE 0.9 %
3 VIAL (ML) INJECTION PRN
Status: CANCELLED | OUTPATIENT
Start: 2025-06-28

## 2025-06-28 RX ORDER — EPINEPHRINE 1 MG/ML(1)
0.5 AMPUL (ML) INJECTION PRN
OUTPATIENT
Start: 2025-06-28

## 2025-06-28 RX ORDER — 0.9 % SODIUM CHLORIDE 0.9 %
3 VIAL (ML) INJECTION PRN
OUTPATIENT
Start: 2025-06-28

## 2025-06-28 RX ORDER — ONDANSETRON 8 MG/1
8 TABLET, ORALLY DISINTEGRATING ORAL PRN
OUTPATIENT
Start: 2025-06-28

## 2025-06-28 RX ORDER — 0.9 % SODIUM CHLORIDE 0.9 %
10 VIAL (ML) INJECTION PRN
Status: CANCELLED | OUTPATIENT
Start: 2025-06-28

## 2025-06-28 RX ORDER — ACETAMINOPHEN 325 MG/1
650 TABLET ORAL PRN
OUTPATIENT
Start: 2025-06-28

## 2025-06-28 RX ORDER — PALONOSETRON 0.05 MG/ML
0.25 INJECTION, SOLUTION INTRAVENOUS ONCE
Status: CANCELLED | OUTPATIENT
Start: 2025-06-28 | End: 2025-06-28

## 2025-06-28 RX ORDER — METHYLPREDNISOLONE SODIUM SUCCINATE 125 MG/2ML
125 INJECTION, POWDER, LYOPHILIZED, FOR SOLUTION INTRAMUSCULAR; INTRAVENOUS PRN
OUTPATIENT
Start: 2025-06-28

## 2025-06-28 RX ORDER — ONDANSETRON 2 MG/ML
8 INJECTION INTRAMUSCULAR; INTRAVENOUS PRN
OUTPATIENT
Start: 2025-06-28

## 2025-06-28 RX ORDER — SODIUM CHLORIDE 9 MG/ML
INJECTION, SOLUTION INTRAVENOUS CONTINUOUS
OUTPATIENT
Start: 2025-06-28

## 2025-06-28 RX ORDER — ONDANSETRON 2 MG/ML
16 INJECTION INTRAMUSCULAR; INTRAVENOUS ONCE
Status: COMPLETED | OUTPATIENT
Start: 2025-06-28 | End: 2025-06-28

## 2025-06-28 RX ORDER — DIPHENHYDRAMINE HYDROCHLORIDE 50 MG/ML
25 INJECTION, SOLUTION INTRAMUSCULAR; INTRAVENOUS ONCE
Status: COMPLETED | OUTPATIENT
Start: 2025-06-28 | End: 2025-06-28

## 2025-06-28 RX ORDER — DIPHENHYDRAMINE HYDROCHLORIDE 50 MG/ML
25 INJECTION, SOLUTION INTRAMUSCULAR; INTRAVENOUS ONCE
Status: CANCELLED | OUTPATIENT
Start: 2025-06-28 | End: 2025-06-28

## 2025-06-28 RX ORDER — SODIUM CHLORIDE 9 MG/ML
1000 INJECTION, SOLUTION INTRAVENOUS PRN
OUTPATIENT
Start: 2025-06-28

## 2025-06-28 RX ADMIN — ENOXAPARIN SODIUM 40 MG: 100 INJECTION SUBCUTANEOUS at 17:25

## 2025-06-28 RX ADMIN — METHOCARBAMOL 750 MG: 750 TABLET ORAL at 05:42

## 2025-06-28 RX ADMIN — DEXAMETHASONE 8 MG: 4 TABLET ORAL at 05:42

## 2025-06-28 RX ADMIN — FOSAPREPITANT 150 MG: 150 INJECTION, POWDER, LYOPHILIZED, FOR SOLUTION INTRAVENOUS at 11:43

## 2025-06-28 RX ADMIN — ONDANSETRON 16 MG: 2 INJECTION INTRAMUSCULAR; INTRAVENOUS at 11:51

## 2025-06-28 RX ADMIN — SODIUM CHLORIDE 1200 MG: 9 INJECTION, SOLUTION INTRAVENOUS at 18:27

## 2025-06-28 RX ADMIN — PACLITAXEL 330 MG: 6 INJECTION, SOLUTION INTRAVENOUS at 13:08

## 2025-06-28 RX ADMIN — DEXAMETHASONE SODIUM PHOSPHATE 20 MG: 10 INJECTION, SOLUTION INTRAMUSCULAR; INTRAVENOUS at 11:38

## 2025-06-28 RX ADMIN — ROSUVASTATIN CALCIUM 20 MG: 20 TABLET, FILM COATED ORAL at 17:25

## 2025-06-28 RX ADMIN — ASPIRIN 81 MG: 81 TABLET, COATED ORAL at 05:42

## 2025-06-28 RX ADMIN — CARBOPLATIN 620 MG: 10 INJECTION, SOLUTION INTRAVENOUS at 17:23

## 2025-06-28 RX ADMIN — MONTELUKAST 10 MG: 10 TABLET, FILM COATED ORAL at 17:26

## 2025-06-28 RX ADMIN — DIPHENHYDRAMINE HYDROCHLORIDE 25 MG: 50 INJECTION, SOLUTION INTRAMUSCULAR; INTRAVENOUS at 11:51

## 2025-06-28 RX ADMIN — DEXAMETHASONE 4 MG: 4 TABLET ORAL at 23:29

## 2025-06-28 RX ADMIN — DOCUSATE SODIUM 50 MG AND SENNOSIDES 8.6 MG 2 TABLET: 8.6; 5 TABLET, FILM COATED ORAL at 17:26

## 2025-06-28 RX ADMIN — METHOCARBAMOL 750 MG: 750 TABLET ORAL at 11:52

## 2025-06-28 RX ADMIN — EZETIMIBE 10 MG: 10 TABLET ORAL at 05:42

## 2025-06-28 RX ADMIN — FAMOTIDINE 20 MG: 10 INJECTION, SOLUTION INTRAVENOUS at 11:52

## 2025-06-28 ASSESSMENT — ENCOUNTER SYMPTOMS
DIZZINESS: 0
ABDOMINAL PAIN: 1
DIARRHEA: 0
WEAKNESS: 0
SHORTNESS OF BREATH: 1
MYALGIAS: 0
ABDOMINAL PAIN: 0
CONSTIPATION: 0
VOMITING: 0
FEVER: 0
NAUSEA: 0
CHILLS: 0
SHORTNESS OF BREATH: 0
COUGH: 0

## 2025-06-28 ASSESSMENT — COGNITIVE AND FUNCTIONAL STATUS - GENERAL
WALKING IN HOSPITAL ROOM: A LITTLE
CLIMB 3 TO 5 STEPS WITH RAILING: A LITTLE
SUGGESTED CMS G CODE MODIFIER MOBILITY: CK
DRESSING REGULAR LOWER BODY CLOTHING: A LITTLE
TOILETING: A LITTLE
STANDING UP FROM CHAIR USING ARMS: A LITTLE
MOBILITY SCORE: 19
MOVING FROM LYING ON BACK TO SITTING ON SIDE OF FLAT BED: A LITTLE
MOVING TO AND FROM BED TO CHAIR: A LITTLE
DAILY ACTIVITIY SCORE: 22
SUGGESTED CMS G CODE MODIFIER DAILY ACTIVITY: CJ

## 2025-06-28 ASSESSMENT — PAIN DESCRIPTION - PAIN TYPE
TYPE: ACUTE PAIN
TYPE: ACUTE PAIN

## 2025-06-28 NOTE — FACE TO FACE
"Face to Face Note  -  Durable Medical Equipment    Lisa Díaz M.D. - NPI: [id number]  I certify that this patient is under my care and that they had a durable medical equipment(DME)face to face encounter by myself that meets the physician DME face-to-face encounter requirements with this patient on:    Date of encounter:   Patient:                    MRN:                       YOB: 2025  Sheree Petty [facility]  [id number]  1945     The encounter with the patient was in whole, or in part, for the following medical condition, which is the primary reason for durable medical equipment:  CHF    I certify that, based on my findings, the following durable medical equipment is medically necessary:    Oxygen   HOME O2 Saturation Measurements:(Values must be present for Home Oxygen orders)  Room air sat at rest: 85     With liters of O2: 3, O2 sat at rest with O2: 93   ,       If patient feels more short of breath, they can go up to 6 liters per minute and contact healthcare provider.    Supporting Symptoms: The patient requires supplemental oxygen, as the following interventions have been tried with limited or no improvement: \"Bronchodilators and/or steroid inhalers.    My Clinical findings support the need for the above equipment due to:  Hypoxia  "

## 2025-06-28 NOTE — PROGRESS NOTES
"Gynecologic Oncology Progress Note    Author: Ramona PerryHÉCTOR    Date/Time: 6/28/2025 8:35 AM    Date of Admit: 6/25/2025    HD#: 3     Interval History: Will get cycle 1 taxol/carbo/simba today.  Very anxious about being able to remember all there is to know about starting chemotherapy and remembering her antinausea medication regimen.  Patient normally lives alone, however, her bedroom is upstairs and family is very concerned about her ability to live alone given her new diagnosis and that she will be going through chemotherapy.  Family plans to look at assisted living facility options this Monday.  Family members who live locally have two-story homes thus they are unable to have patient moving with them while she goes through treatment.  Shortness of breath much better after having left thoracentesis yesterday, however, has not been able to wean off of oxygen yet.    Review of Systems   Constitutional:  Negative for chills and fever.   Respiratory:  Negative for shortness of breath.    Cardiovascular:  Negative for chest pain and leg swelling.   Gastrointestinal:  Negative for abdominal pain, constipation, diarrhea, nausea and vomiting.   Genitourinary:  Negative for dysuria and hematuria.   Musculoskeletal:  Negative for myalgias.   Neurological:  Negative for dizziness and weakness.       Allergies[1]       Current Medications[2]       Objective:     /61   Pulse 76   Temp 36.5 °C (97.7 °F) (Temporal)   Resp 16   Ht 1.626 m (5' 4\")   Wt 78 kg (172 lb)   SpO2 95%     Physical Exam  Constitutional:       General: She is not in acute distress.  Cardiovascular:      Rate and Rhythm: Normal rate.      Pulses: Normal pulses.   Pulmonary:      Effort: Pulmonary effort is normal.   Abdominal:      General: Abdomen is flat. There is no distension.      Palpations: Abdomen is soft.   Skin:     General: Skin is warm and dry.   Neurological:      Mental Status: She is alert.              Recent Labs     " 06/26/25  0531 06/27/25  0636 06/28/25  0510   WBC 7.3 7.8 7.0   RBC 4.72 4.58 4.69   HEMOGLOBIN 11.8* 11.4* 11.7*   HEMATOCRIT 38.1 36.9* 37.6   MCV 80.7* 80.6* 80.2*   MCH 25.0* 24.9* 24.9*   MCHC 31.0* 30.9* 31.1*   RDW 44.5 43.8 43.6   PLATELETCT 594* 516* 448*   MPV 8.9* 8.8* 9.2     Recent Labs     06/26/25  0531 06/27/25  0636 06/28/25  0510   SODIUM 135 134* 133*   POTASSIUM 4.3 3.7 4.4   CHLORIDE 98 96 95*   CO2 24 25 26   GLUCOSE 104* 156* 151*   BUN 8 12 15   CREATININE 0.54 0.49* 0.54   CALCIUM 8.8 8.7 9.0     Recent Labs     06/25/25  1514 06/26/25  0531 06/27/25  0636   ASTSGOT 46* 39 21   ALTSGPT 32 31 21   TBILIRUBIN 0.3 0.3 0.3   ALKPHOSPHAT 128* 116* 105*   GLOBULIN 3.5 3.4 3.4   INR 1.12  --   --      Recent Labs     06/25/25 1514   INR 1.12           Assessment and Plan: This is a 80 y.o. female who presents with shortness of breath found to have large bilateral pleural effusions and a 20 cm ovarian neoplasm on CT.      #Pelvic mass: 20 cm multiloculated cystic and associated with peritoneal carcinomatosis and large volume ascites.  Patient also noted to have large pleural effusions.    - 1392  -High suspicion for stage IV ovarian cancer.  -Dr. Lancaster previously reviewed treatment options including doing nothing and opting for symptom management with hospice or  neoadjuvant chemotherapy followed by interval cytoreductive surgery versus initial debulking if patient is severely symptomatic/in pain.  As patient currently denies severe pain symptoms, he would recommend against proceeding with surgery upfront.    -Patient has chosen to proceed with neoadjuvant chemotherapy  -Risks, benefits, side effects, and anti nausea regimen, discussed with pt in detail.  Also reviewed premedication with dexamethasone to prevent allergic reaction to paclitaxel prior to each treatment cycle with patient and family.  -Plan to proceed with cycle #1 of taxol/carbo/ Bevacizumab 6/28 today  -Written information  regarding paclitaxel, carboplatin, and bevacizumab, provided at bedside to patient today.  Again reviewed common expected side effects and management with patient and family at bedside.  Also discussed antiemetic regimen in detail today.  Prescription sent to patient's outpatient pharmacy.  -Will have follow-up with our office in 3 weeks prior to cycle #2  - Family concern regarding patient's ability to mobilize during this hospitalization and ability to care for herself at home.  PT/OT evals ordered.  Family looking at assisted living options.       #Pleural effusions: Most likely secondary to underlying malignancy.   -S/p right thoracentesis 6/26 with 1750 ml removed  -s/p eft thoracentesis 6/27 with 1000 mo removed   -Follow cytology  -Not yet weaned off of oxygen.  May require home O2.     #History of CAD: Follows with Dr. Pinzon for cardiology. On ASA at home.       This case was discussed with Dr. Lancaster and Dr. Quintero.             [1] No Known Allergies  [2]   Current Facility-Administered Medications:     acetaminophen (Tylenol) tablet 650 mg, 650 mg, Oral, Q4HRS PRN, Ramona Sommer M.D.    enoxaparin (Lovenox) inj 40 mg, 40 mg, Subcutaneous, DAILY AT 1800, Ramona Sommer M.D., 40 mg at 06/27/25 1605    labetalol (Normodyne/Trandate) injection 10 mg, 10 mg, Intravenous, Q4HRS PRN, Kendrick Acevedo M.D.    ondansetron (Zofran) syringe/vial injection 4 mg, 4 mg, Intravenous, Q4HRS PRN **OR** ondansetron (Zofran ODT) dispertab 4 mg, 4 mg, Oral, Q4HRS PRN, Kendrick Acevedo M.D.    guaiFENesin dextromethorphan (Robitussin DM) 100-10 MG/5ML syrup 10 mL, 10 mL, Oral, Q6HRS PRN, Kendrick Acveedo M.D.    senna-docusate (Pericolace Or Senokot S) 8.6-50 MG per tablet 2 Tablet, 2 Tablet, Oral, Q EVENING, 2 Tablet at 06/27/25 1605 **AND** polyethylene glycol/lytes (Miralax) Packet 1 Packet, 1 Packet, Oral, QDAY PRN, Kendrick Acevedo M.D.    ipratropium-albuterol (DUONEB) nebulizer solution, 3 mL, Nebulization, Q4H PRN  (RT), Kendrick Acevedo M.D.    oxyCODONE immediate-release (Roxicodone) tablet 5 mg, 5 mg, Oral, Q3HRS PRN, 5 mg at 06/27/25 2139 **OR** oxyCODONE immediate release (Roxicodone) tablet 10 mg, 10 mg, Oral, Q3HRS PRN, 10 mg at 06/27/25 1604 **OR** HYDROmorphone (Dilaudid) injection 0.5 mg, 0.5 mg, Intravenous, Q3HRS PRN, Kendrick Acevedo M.D., 0.5 mg at 06/27/25 1712    albuterol inhaler 2 Puff, 2 Puff, Inhalation, Q4H PRN (RT), Kendrick Acevedo M.D.    amLODIPine (Norvasc) tablet 5 mg, 5 mg, Oral, DAILY, Ramona Sommer M.D.    aspirin EC tablet 81 mg, 81 mg, Oral, DAILY, Ramona Sommer M.D., 81 mg at 06/28/25 0542    ezetimibe (Zetia) tablet 10 mg, 10 mg, Oral, DAILY, Kendrick Acevedo M.D., 10 mg at 06/28/25 0542    montelukast (Singulair) tablet 10 mg, 10 mg, Oral, QDAY, Kendrick Acevedo M.D., 10 mg at 06/27/25 2139    rosuvastatin (Crestor) tablet 20 mg, 20 mg, Oral, Q EVENING, Kendrick Acevedo M.D., 20 mg at 06/27/25 1605    LORazepam (Ativan) tablet 0.5 mg, 0.5 mg, Oral, Q4HRS PRN, Kendrick Acevedo M.D.    methocarbamol (Robaxin) tablet 750 mg, 750 mg, Oral, TID, Kendrick Acevedo M.D., 750 mg at 06/28/25 0542

## 2025-06-28 NOTE — PROGRESS NOTES
Chemotherapy Verification - PRIMARY RN      Height = 162.6 cm  Weight = 78 kg  BSA = 1.88 m2       Medication: Paclitaxel  Dose: 175 mg /m2  Calculated Dose: 329 mg (Ordered Dose: 330 mg)                             (In mg/m2, AUC, mg/kg)     Medication: Carboplatin  Dose: AUC 6  Calculated Dose: 621.2 mg                             (In mg/m2, AUC, mg/kg)    Medication: Bevacizumab  Dose: 15 mg/kg  Calculated Dose: 1170 mg (Ordered Dose: 1200 mg)                             (In mg/m2, AUC, mg/kg)          Carboplatin calculation (if applicable):  (6*(78.533+25)) = 621.198       I confirm this process was performed independently with the BSA and all final chemotherapy dosing calculations congruent.  Any discrepancies of 10% or greater have been addressed with the chemotherapy pharmacist. The resolution of the discrepancy has been documented in the EPIC progress notes.

## 2025-06-28 NOTE — PROGRESS NOTES
Chemotherapy Verification - SECONDARY RN  Cycle 1 Day 1       Height = 162.6 cm  Weight = 78 kg  BSA = 1.88 m2       Medication: Paclitaxel  Dose: 175 mg/m2  Calculated Dose: 329 mg   (Ordered dose: 330 mg)                            (In mg/m2, AUC, mg/kg)     Medication: Carboplatin  Dose: AUC 6  Calculated Dose: 624 mg   (Ordered dose:  620 mg)                            (In mg/m2, AUC, mg/kg)    Medication: Bevacizumab  Dose: 15 mg/kg  Calculated Dose: 1170 mg   (Ordered dose: 1200 mg)                             (In mg/m2, AUC, mg/kg)      Carboplatin calculation (if applicable):  (6*(101.802+25)) = 760.812     I confirm that this process was performed independently.

## 2025-06-28 NOTE — PROGRESS NOTES
"Hospital Medicine Daily Progress Note    Date of Service  6/28/2025    Chief Complaint  Sheree Garcia is a 80 y.o. female admitted 6/25/2025 with worsening shortness of breath    Hospital Course  Sheree Garcia is an 80-year-old female with PMHx CAD, HLD.  Admitted 6/25 for shortness of breath.    Per history: Patient developed severe cough several days ago.  This has been associated with shortness of breath.  Patient states she was diagnosed with \"fluid on the lung\" a few days ago.  In the ED: CTA chest: Negative for PE.  Notable large bilateral pleural effusions associated with atelectasis.  CTA A/P: Extensive peritoneal carcinomatosis with large volume ascites.  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    Initially, patient was requiring BiPAP.  She was then successfully weaned to 2 L/min via nasal cannula.  ICU was consulted but in agreement with medical floor for now.  Patient will undergo thoracentesis this a.m.  Dr. Lancaster with gyn oncology was also consulted.     Interval Problem Update  6/26: Vitals notable for improvement in tachycardia.  Heart rate now in the 80s from 120's in the ED.  Tachypnea is also improving.  SBP ranging 121 through 141.  Patient is on 3 L/min supplemental O2.  WBC 7 from 9.  Hb 11 from 12.  Planning for thoracentesis today. Holding DVT prophylaxis while possible surgical planning occurs.     6/27: Vitals notable for persistent tachycardia.  Heart rate ranging .  Tachypnea is present.   through 145.  Patient remains on 3 L/min supplemental O2.   Discussed with HÉCTOR Minaya.  No surgical interventions are planned at this time.  Patient agreeable with neoadjuvant chemotherapy.  Planning to start tomorrow.  1 day course.      6/28:  -- Patient is alert, awake, answering questions appropriate, vital sign has been reviewed, patient family is noted to be at bedside.  Dr. Tonny Perry went and evaluate the patient, plan " is to start chemo today  Labs reviewed, sodium at 133, hemoglobin 11.7, platelet 448.  Patient reports that her abdominal distention has gotten better as well as her breathing, will obtain PT/OT, will obtain home O2 evaluation.    Plan of care  has been discussed with Gyn/onc  Pressure breathing has gotten better, she underwent thoracentesis on 6/27 and had 1 L of fluid removed; s/p paracentesis and had 2 L of fluid removed.    I have discussed this patient's plan of care and discharge plan at IDT rounds today with Case Management, Nursing, Nursing leadership, and other members of the IDT team.    Consultants/Specialty  critical care and gyn oncology     Code Status  DNAR/DNI    Disposition  Medically Cleared  I have placed the appropriate orders for post-discharge needs.    Review of Systems  Review of Systems   Constitutional:  Positive for malaise/fatigue. Negative for fever.   Respiratory:  Positive for shortness of breath. Negative for cough.    Cardiovascular:  Positive for leg swelling. Negative for chest pain.   Gastrointestinal:  Positive for abdominal pain. Negative for nausea and vomiting.        Physical Exam  Temp:  [36.2 °C (97.1 °F)-37.4 °C (99.4 °F)] 36.5 °C (97.7 °F)  Pulse:  [76-98] 76  Resp:  [16-30] 16  BP: (113-139)/(61-84) 113/61  SpO2:  [92 %-96 %] 95 %    Physical Exam  Vitals and nursing note reviewed.   Constitutional:       General: She is not in acute distress.     Appearance: Normal appearance. She is ill-appearing.   Cardiovascular:      Rate and Rhythm: Normal rate and regular rhythm.   Pulmonary:      Effort: Pulmonary effort is normal.      Breath sounds: Normal breath sounds.   Abdominal:      General: Bowel sounds are normal. There is distension.      Tenderness: There is no abdominal tenderness.      Comments: Moderate abdominal distention is noted   Skin:     General: Skin is warm and dry.      Coloration: Skin is pale.   Neurological:      Mental Status: She is alert and  oriented to person, place, and time.      Motor: Weakness present.   Psychiatric:         Mood and Affect: Mood normal.         Behavior: Behavior normal.         Fluids    Intake/Output Summary (Last 24 hours) at 6/28/2025 1359  Last data filed at 6/28/2025 0501  Gross per 24 hour   Intake --   Output 1050 ml   Net -1050 ml        Laboratory  Recent Labs     06/26/25  0531 06/27/25  0636 06/28/25  0510   WBC 7.3 7.8 7.0   RBC 4.72 4.58 4.69   HEMOGLOBIN 11.8* 11.4* 11.7*   HEMATOCRIT 38.1 36.9* 37.6   MCV 80.7* 80.6* 80.2*   MCH 25.0* 24.9* 24.9*   MCHC 31.0* 30.9* 31.1*   RDW 44.5 43.8 43.6   PLATELETCT 594* 516* 448*   MPV 8.9* 8.8* 9.2     Recent Labs     06/26/25  0531 06/27/25  0636 06/28/25  0510   SODIUM 135 134* 133*   POTASSIUM 4.3 3.7 4.4   CHLORIDE 98 96 95*   CO2 24 25 26   GLUCOSE 104* 156* 151*   BUN 8 12 15   CREATININE 0.54 0.49* 0.54   CALCIUM 8.8 8.7 9.0     Recent Labs     06/25/25  1514   INR 1.12               Imaging  DX-CHEST-PORTABLE (1 VIEW)   Final Result      1.  Hypoinflation with mild pulmonary edema.   2.  Worsening LEFT lung base consolidation and small pleural effusion.      DX-CHEST-PORTABLE (1 VIEW)   Final Result      1.  Interval improvement of LEFT pleural fluid.   2.  No pneumothorax.   3.  Persistent hypoinflation with bibasilar atelectasis.      US-THORACENTESIS PUNCTURE LEFT   Final Result      1. Real-time Ultrasound guided left sided therapeutic and diagnostic thoracentesis.      2. 1000 mL of fluid withdrawn.      DX-CHEST-PORTABLE (1 VIEW)   Final Result      1.  Extensive left perihilar infiltrate consistent with pneumonitis.   2.  Small left pleural effusion with left lateral pleural thickening.      US-PARACENTESIS, ABD WITH IMAGING   Final Result      1. Ultrasound-guided therapeutic and diagnostic paracentesis of the left lower quadrant of the abdominal wall.      2. 2050 mL of fluid withdrawn.      US-THORACENTESIS PUNCTURE RIGHT   Final Result      1. Ultrasound  guided right sided therapeutic and diagnostic thoracentesis.      2. 1750 mL of fluid withdrawn.      US-EXTREMITY VENOUS LOWER BILAT   Final Result      CT-ABDOMEN-PELVIS WITH   Final Result      1.  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   2.  Extensive peritoneal carcinomatosis with large volume ascites.   3.  Partially visualized large bilateral pleural effusions. Malignant pleural effusions are possible.      CT-CTA CHEST PULMONARY ARTERY W/ RECONS   Final Result      1.  No CT evidence for pulmonary emboli.   2.  Large bilateral pleural effusions with associated atelectasis.   3.  Ascites with findings concerning for peritoneal tumor implants in the upper abdomen.               DX-CHEST-PORTABLE (1 VIEW)   Final Result      1.  Worsening pulmonary edema.   2.  Worsening bilateral pleural effusions, larger on the RIGHT.   3.  No pneumothorax.           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 Lancaster following  - Patient agreeable to a short course of chemotherapy; she is planning 3 cycles.  Planning to start first cycle today    Bilateral leg edema  Assessment & Plan  Lower extremity US: Negative for DVT  - Diuresis with Lasix 40 mg twice daily  - Close monitoring for hypotension and urinary output    Cancer associated pain  Assessment & Plan  Supportive pain control    Hypertension  Assessment & Plan  IV lasix for now  Resume amlodipine when appropriate    CAD (coronary artery disease)  Assessment & Plan  Followed by Dr. Coleman  No history of stent or CABG  - Holding ASA for possible surgery  - Continue statin and Zetia    Pneumonia due to infectious organism  Assessment & Plan  Concern for malignancy, large bilateral pleural effusions, as well as need for  intra-abdominal coverage  - Discontinue IV Zosyn no obvious signs of infection or underlying bacterial pneumonia   - Continue monitor closely  - Follow thoracentesis fluid analysis     Acute respiratory failure with hypoxia (HCC)  Assessment & Plan  Secondary to bilateral pleural effusions  Initially requiring BiPAP in the ED  Currently on 2 L/min supplemental O2  - Wean oxygen as able   Obtain home O2 eval    Malignant ascites  Assessment & Plan  Paracentesis 6/26 with 2.7L removed    Malignant pleural effusion  Assessment & Plan  Large bilateral pleural effusions noted on CTA chest  Thought to be malignant etiology    Trial of diuresis  Right thora 6/26 with 2L removed  Thoracentesis on 6/27 and ahd 1 L of fluid removed         VTE prophylaxis: lovenox

## 2025-06-28 NOTE — PROGRESS NOTES
"Pharmacy Chemotherapy Calculation:    Treatment Plan Provider: Dr. Lancaster  Dx: Ovarian cancer         Protocol: GOG-218 CARBOplatin/PACLitaxel + Bevacizumab   *Dosing Reference*  PACLitaxel 175 mg/m² IV over 3 hours on Day 1 followed by  CARBOplatin AUC 6 IV over 30 minutes on Day 1  Bevacizumab 15 mg/kg IV on Day 1  21-day cycle for 3 - 6 cycles (neoadjuvant) or 6 cycles (adjuvant or recurrent)  ~Followed by~  Bevacizumab 15 mg/kg IV on Day 1  Maintenance: 21-day cycle for 16 cycles (adjuvant or recurrent)    NCCN Guidelines® for Ovarian Cancer/Fallopian Tube Cancer/Primary Peritoneal Cancer V.1.2025.  Elias COLIN et al. N Engl J Med. 2011;365(26):5873-8    Allergies:  Patient has no known allergies.       /61   Pulse 76   Temp 36.5 °C (97.7 °F) (Temporal)   Resp 16   Ht 1.626 m (5' 4\")   Wt 78 kg (172 lb)   SpO2 95%   BMI 29.52 kg/m²  Body surface area is 1.88 meters squared.    Labs 6/26/25:  ANC~ 5790 Plt = 594k   Hgb = 11.8     SCr = 0.54 mg/dL CrCl ~ 78.5 mL/min (min SCr 0.7 used)  AST/ALT/AP = 39/31/116 TBili = 0.3  Mag = 2  K+ = 4.3  Urine protein = ordered      Drug Order   (Drug name, dose, route, IV Fluid & volume, frequency, number of doses) Cycle 1  Previous treatment: n/a     Medication = PACLitaxel  Base Dose = 175 mg/m2  Calc Dose: Base Dose x 1.88 m2 = 329 mg  Final Dose = 330 mg  Route = IV  Fluid & Volume =  mL  Admin Duration = Over 3 hours          <10% difference, OK to treat with final dose   Medication = CARBOplatin   Base Dose = AUC 6  Calc Dose: Base Dose x (78.5 mL/min + 25) = 621 mg  Final Dose = 620 mg  Route = IV  Fluid & Volume =  mL  Admin Duration = Over 30 minutes          <10% difference, OK to treat with final dose   Medication = Bevacizumab   Base Dose = 15 mg/kg  Calc Dose:Base Dose x 78 kg = 1170 mg  Final Dose = 1200 mg  Route = IV  Fluid & Volume =  mL  Admin Duration = Over 90 minutes          <10% difference, OK to treat with final dose     By my " signature below, I confirm this process was performed independently with the BSA and all final chemotherapy dosing calculations congruent. I have reviewed the above chemotherapy order and that my calculation of the final dose and BSA (when applicable) corroborate those calculations of the  pharmacist. Discrepancies of 10% or greater in the written dose have been addressed and documented within the EPIC Progress notes.    Reena JonesD

## 2025-06-28 NOTE — CARE PLAN
The patient is Watcher - Medium risk of patient condition declining or worsening    Shift Goals  Clinical Goals: pain management  Patient Goals: rest, start chemo  Family Goals: Updates on POC    Progress made toward(s) clinical / shift goals:        Problem: Knowledge Deficit - Standard  Goal: Patient and family/care givers will demonstrate understanding of plan of care, disease process/condition, diagnostic tests and medications  Outcome: Progressing  Note: Patient is AxO x4 and understands plan of care, all questions answered at this time. Call light and personal belongings are within reach. Pt calls appropriately for nursing needs. Frequent rounding in place. Bed is locked and in lowest position.      Problem: Pain - Standard  Goal: Alleviation of pain or a reduction in pain to the patient’s comfort goal  Outcome: Progressing  Note: Pt rates pain using 1-10 pain scale. Pt medicated per mar. Pt requests pain medication appropriately. Patients pain reassessed within 1-2 hours of giving pain medication. Pt educated on non-pharmacological techniques to decrease pain.  Pt received oxycodone once throughout the shift.        Patient is not progressing towards the following goals:

## 2025-06-28 NOTE — DISCHARGE PLANNING
1554  DPA sent referral to [facility] for oxygen, per choice form.     1603  DPA spoke with Jonathan ([facility]) to follow up on oxygen referral. Provided Jonathan with pt info and will deliver oxygen in about 30-45 minutes. Advised MIKEY Zafar.

## 2025-06-28 NOTE — PROGRESS NOTES
"Pharmacy Chemotherapy Calculations Note:    Dx: Ovarian Cancer, stage IV  Cycle: 1 Previous treatment: n/a     Protocol: Carboplatin + Paclitaxel + Bevacizumab  PACLitaxel 175 mg/m² IV over 3 hours on Day 1  followed by  CARBOplatin AUC 6 IV over 30 minutes on Day 1  Bevacizumab 15 mg/kg IV on Day 1  NCCN Guidelines for Ovarian Cancers. V.1.2025.  Elias COLIN, et al; [employer]. Incorporation of bevacizumab in the primary treatment of ovarian cancer. N Engl J Med. 2011 Dec 29;365(04):0384-55. doi: 10.1056/ERGOtr9788834. PMID: 56934469.           /61   Pulse 76   Temp 36.5 °C (97.7 °F) (Temporal)   Resp 16   Ht 1.626 m (5' 4\")   Wt 78 kg (172 lb)   SpO2 95%   BMI 29.52 kg/m²  Body surface area is 1.88 meters squared.    Labs from 6/26-6/28/25 reviewed - all within treatment parameters. CrCl ~79 mL/min using min SCr 0.7 mg/dL. BP ok, urinalysis ordered       Paclitaxel 175 mg/m² x 1.88 m² = 329 mg   <10% difference, okay to treat with final dose = 330 mg IV    Carboplatin AUC 6 x (79 + 25) = 624 mg   <10% difference, okay to treat with final dose = 620 mg IV    Bevacizumab-bvzr (Zirabev) 15 mg/kg x 78 kg = 1170 mg   <10% difference, okay to treat with final dose = 1200 mg IV      Bibi Peck, PharmD, BCOP           "

## 2025-06-29 LAB
ALBUMIN SERPL BCP-MCNC: 2.7 G/DL (ref 3.2–4.9)
ALBUMIN/GLOB SERPL: 0.8 G/DL
ALP SERPL-CCNC: 113 U/L (ref 30–99)
ALT SERPL-CCNC: 36 U/L (ref 2–50)
ANION GAP SERPL CALC-SCNC: 12 MMOL/L (ref 7–16)
AST SERPL-CCNC: 58 U/L (ref 12–45)
BACTERIA FLD AEROBE CULT: NORMAL
BACTERIA FLD AEROBE CULT: NORMAL
BILIRUB SERPL-MCNC: <0.2 MG/DL (ref 0.1–1.5)
BUN SERPL-MCNC: 18 MG/DL (ref 8–22)
CALCIUM ALBUM COR SERPL-MCNC: 9.6 MG/DL (ref 8.5–10.5)
CALCIUM SERPL-MCNC: 8.6 MG/DL (ref 8.5–10.5)
CHLORIDE SERPL-SCNC: 97 MMOL/L (ref 96–112)
CO2 SERPL-SCNC: 25 MMOL/L (ref 20–33)
CREAT SERPL-MCNC: 0.54 MG/DL (ref 0.5–1.4)
EKG IMPRESSION: NORMAL
ERYTHROCYTE [DISTWIDTH] IN BLOOD BY AUTOMATED COUNT: 42.8 FL (ref 35.9–50)
GFR SERPLBLD CREATININE-BSD FMLA CKD-EPI: 93 ML/MIN/1.73 M 2
GLOBULIN SER CALC-MCNC: 3.5 G/DL (ref 1.9–3.5)
GLUCOSE SERPL-MCNC: 176 MG/DL (ref 65–99)
GRAM STN SPEC: NORMAL
GRAM STN SPEC: NORMAL
HCT VFR BLD AUTO: 35.5 % (ref 37–47)
HGB BLD-MCNC: 11.4 G/DL (ref 12–16)
MAGNESIUM SERPL-MCNC: 2.3 MG/DL (ref 1.5–2.5)
MCH RBC QN AUTO: 25.4 PG (ref 27–33)
MCHC RBC AUTO-ENTMCNC: 32.1 G/DL (ref 32.2–35.5)
MCV RBC AUTO: 79.1 FL (ref 81.4–97.8)
PHOSPHATE SERPL-MCNC: 2.5 MG/DL (ref 2.5–4.5)
PLATELET # BLD AUTO: 519 K/UL (ref 164–446)
PMV BLD AUTO: 9.3 FL (ref 9–12.9)
POTASSIUM SERPL-SCNC: 4.4 MMOL/L (ref 3.6–5.5)
PROCALCITONIN SERPL-MCNC: 0.2 NG/ML
PROT SERPL-MCNC: 6.2 G/DL (ref 6–8.2)
RBC # BLD AUTO: 4.49 M/UL (ref 4.2–5.4)
SIGNIFICANT IND 70042: NORMAL
SIGNIFICANT IND 70042: NORMAL
SITE SITE: NORMAL
SITE SITE: NORMAL
SODIUM SERPL-SCNC: 134 MMOL/L (ref 135–145)
SOURCE SOURCE: NORMAL
SOURCE SOURCE: NORMAL
WBC # BLD AUTO: 11.1 K/UL (ref 4.8–10.8)

## 2025-06-29 PROCEDURE — 97535 SELF CARE MNGMENT TRAINING: CPT

## 2025-06-29 PROCEDURE — 770004 HCHG ROOM/CARE - ONCOLOGY PRIVATE *

## 2025-06-29 PROCEDURE — 84145 PROCALCITONIN (PCT): CPT

## 2025-06-29 PROCEDURE — 700111 HCHG RX REV CODE 636 W/ 250 OVERRIDE (IP): Mod: JZ | Performed by: STUDENT IN AN ORGANIZED HEALTH CARE EDUCATION/TRAINING PROGRAM

## 2025-06-29 PROCEDURE — A9270 NON-COVERED ITEM OR SERVICE: HCPCS | Performed by: STUDENT IN AN ORGANIZED HEALTH CARE EDUCATION/TRAINING PROGRAM

## 2025-06-29 PROCEDURE — 80053 COMPREHEN METABOLIC PANEL: CPT

## 2025-06-29 PROCEDURE — 700111 HCHG RX REV CODE 636 W/ 250 OVERRIDE (IP)

## 2025-06-29 PROCEDURE — 700102 HCHG RX REV CODE 250 W/ 637 OVERRIDE(OP): Performed by: STUDENT IN AN ORGANIZED HEALTH CARE EDUCATION/TRAINING PROGRAM

## 2025-06-29 PROCEDURE — 99233 SBSQ HOSP IP/OBS HIGH 50: CPT | Performed by: HOSPITALIST

## 2025-06-29 PROCEDURE — 700111 HCHG RX REV CODE 636 W/ 250 OVERRIDE (IP): Mod: JZ | Performed by: HOSPITALIST

## 2025-06-29 PROCEDURE — 83735 ASSAY OF MAGNESIUM: CPT

## 2025-06-29 PROCEDURE — 97166 OT EVAL MOD COMPLEX 45 MIN: CPT

## 2025-06-29 PROCEDURE — 85027 COMPLETE CBC AUTOMATED: CPT

## 2025-06-29 PROCEDURE — 97163 PT EVAL HIGH COMPLEX 45 MIN: CPT

## 2025-06-29 PROCEDURE — 84100 ASSAY OF PHOSPHORUS: CPT

## 2025-06-29 PROCEDURE — 700102 HCHG RX REV CODE 250 W/ 637 OVERRIDE(OP)

## 2025-06-29 PROCEDURE — 93005 ELECTROCARDIOGRAM TRACING: CPT | Mod: TC | Performed by: NURSE PRACTITIONER

## 2025-06-29 PROCEDURE — 93010 ELECTROCARDIOGRAM REPORT: CPT | Performed by: INTERNAL MEDICINE

## 2025-06-29 RX ORDER — FUROSEMIDE 10 MG/ML
20 INJECTION INTRAMUSCULAR; INTRAVENOUS 2 TIMES DAILY
Status: COMPLETED | OUTPATIENT
Start: 2025-06-29 | End: 2025-06-30

## 2025-06-29 RX ADMIN — ASPIRIN 81 MG: 81 TABLET, COATED ORAL at 05:14

## 2025-06-29 RX ADMIN — MONTELUKAST 10 MG: 10 TABLET, FILM COATED ORAL at 18:07

## 2025-06-29 RX ADMIN — ENOXAPARIN SODIUM 40 MG: 100 INJECTION SUBCUTANEOUS at 18:07

## 2025-06-29 RX ADMIN — DOCUSATE SODIUM 50 MG AND SENNOSIDES 8.6 MG 2 TABLET: 8.6; 5 TABLET, FILM COATED ORAL at 18:07

## 2025-06-29 RX ADMIN — ONDANSETRON 4 MG: 4 TABLET, ORALLY DISINTEGRATING ORAL at 21:40

## 2025-06-29 RX ADMIN — DEXAMETHASONE 4 MG: 4 TABLET ORAL at 05:14

## 2025-06-29 RX ADMIN — AMLODIPINE BESYLATE 5 MG: 5 TABLET ORAL at 05:14

## 2025-06-29 RX ADMIN — EZETIMIBE 10 MG: 10 TABLET ORAL at 05:14

## 2025-06-29 RX ADMIN — POLYETHYLENE GLYCOL 3350 1 PACKET: 17 POWDER, FOR SOLUTION ORAL at 09:46

## 2025-06-29 RX ADMIN — ROSUVASTATIN CALCIUM 20 MG: 20 TABLET, FILM COATED ORAL at 18:06

## 2025-06-29 RX ADMIN — DEXAMETHASONE 4 MG: 4 TABLET ORAL at 18:06

## 2025-06-29 RX ADMIN — FUROSEMIDE 20 MG: 10 INJECTION, SOLUTION INTRAVENOUS at 18:07

## 2025-06-29 RX ADMIN — ONDANSETRON 4 MG: 4 TABLET, ORALLY DISINTEGRATING ORAL at 09:36

## 2025-06-29 RX ADMIN — DEXAMETHASONE 4 MG: 4 TABLET ORAL at 11:32

## 2025-06-29 RX ADMIN — FUROSEMIDE 20 MG: 10 INJECTION, SOLUTION INTRAVENOUS at 11:52

## 2025-06-29 RX ADMIN — ONDANSETRON 4 MG: 4 TABLET, ORALLY DISINTEGRATING ORAL at 15:04

## 2025-06-29 ASSESSMENT — COGNITIVE AND FUNCTIONAL STATUS - GENERAL
SUGGESTED CMS G CODE MODIFIER DAILY ACTIVITY: CJ
HELP NEEDED FOR BATHING: A LITTLE
MOBILITY SCORE: 18
TOILETING: A LITTLE
STANDING UP FROM CHAIR USING ARMS: A LITTLE
PERSONAL GROOMING: A LITTLE
DRESSING REGULAR LOWER BODY CLOTHING: A LITTLE
SUGGESTED CMS G CODE MODIFIER MOBILITY: CK
WALKING IN HOSPITAL ROOM: A LITTLE
TURNING FROM BACK TO SIDE WHILE IN FLAT BAD: A LITTLE
MOVING FROM LYING ON BACK TO SITTING ON SIDE OF FLAT BED: A LITTLE
CLIMB 3 TO 5 STEPS WITH RAILING: A LITTLE
MOVING TO AND FROM BED TO CHAIR: A LITTLE
DAILY ACTIVITIY SCORE: 20

## 2025-06-29 ASSESSMENT — ACTIVITIES OF DAILY LIVING (ADL): TOILETING: INDEPENDENT

## 2025-06-29 ASSESSMENT — GAIT ASSESSMENTS
GAIT LEVEL OF ASSIST: STANDBY ASSIST
ASSISTIVE DEVICE: FRONT WHEEL WALKER
DISTANCE (FEET): 170
DEVIATION: BRADYKINETIC

## 2025-06-29 ASSESSMENT — ENCOUNTER SYMPTOMS
DIZZINESS: 0
ABDOMINAL PAIN: 0
MYALGIAS: 0
DIARRHEA: 0
WEAKNESS: 0
FEVER: 0
SHORTNESS OF BREATH: 1
NAUSEA: 0
VOMITING: 0
COUGH: 0
CONSTIPATION: 0
ABDOMINAL PAIN: 1
SHORTNESS OF BREATH: 0
CHILLS: 0

## 2025-06-29 ASSESSMENT — PAIN DESCRIPTION - PAIN TYPE
TYPE: ACUTE PAIN
TYPE: ACUTE PAIN

## 2025-06-29 NOTE — PROGRESS NOTES
"Gynecologic Oncology Progress Note    Author: Ramona PerryHÉCTOR    Date/Time: 6/29/2025 8:24 AM    Date of Admit: 6/25/2025    HD#: 4     Interval History: s/p cycle 1 taxol/carbo/sibma yesterday.  Feeling generally well no concerns or complaints today.  Home oxygen has been set up but she is waiting on PT OT eval to assess needs for home.  Had episode of high heart rate overnight.  Now on telemetry monitoring in sinus rhythm.    Review of Systems   Constitutional:  Negative for chills and fever.   Respiratory:  Negative for shortness of breath.    Cardiovascular:  Negative for chest pain and leg swelling.   Gastrointestinal:  Negative for abdominal pain, constipation, diarrhea, nausea and vomiting.   Genitourinary:  Negative for dysuria and hematuria.   Musculoskeletal:  Negative for myalgias.   Neurological:  Negative for dizziness and weakness.       Allergies[1]       Current Medications[2]       Objective:     /66   Pulse 83   Temp 36.4 °C (97.5 °F) (Temporal)   Resp 16   Ht 1.626 m (5' 4\")   Wt 78 kg (172 lb)   SpO2 90%     Physical Exam  Constitutional:       General: She is not in acute distress.     Appearance: She is not toxic-appearing.   Eyes:      General:         Right eye: No discharge.         Left eye: No discharge.   Cardiovascular:      Rate and Rhythm: Normal rate.      Pulses: Normal pulses.   Pulmonary:      Effort: Pulmonary effort is normal.   Skin:     General: Skin is dry.   Neurological:      General: No focal deficit present.      Mental Status: She is alert and oriented to person, place, and time.   Psychiatric:         Mood and Affect: Mood normal.         Behavior: Behavior normal.              Recent Labs     06/27/25  0636 06/28/25  0510   WBC 7.8 7.0   RBC 4.58 4.69   HEMOGLOBIN 11.4* 11.7*   HEMATOCRIT 36.9* 37.6   MCV 80.6* 80.2*   MCH 24.9* 24.9*   MCHC 30.9* 31.1*   RDW 43.8 43.6   PLATELETCT 516* 448*   MPV 8.8* 9.2     Recent Labs     06/27/25 0636 06/28/25  0510 "   SODIUM 134* 133*   POTASSIUM 3.7 4.4   CHLORIDE 96 95*   CO2 25 26   GLUCOSE 156* 151*   BUN 12 15   CREATININE 0.49* 0.54   CALCIUM 8.7 9.0     Recent Labs     06/27/25  0636   ASTSGOT 21   ALTSGPT 21   TBILIRUBIN 0.3   ALKPHOSPHAT 105*   GLOBULIN 3.4                 Assessment and Plan: This is a 80 y.o. female who presents with shortness of breath found to have large bilateral pleural effusions and a 20 cm ovarian neoplasm on CT.      #Pelvic mass: 20 cm multiloculated cystic and associated with peritoneal carcinomatosis and large volume ascites.  Patient also noted to have large pleural effusions.    - 1392  -High suspicion for stage IV ovarian cancer.  -Dr. Lancaster previously reviewed treatment options including doing nothing and opting for symptom management with hospice or  neoadjuvant chemotherapy followed by interval cytoreductive surgery versus initial debulking if patient is severely symptomatic/in pain.  As patient currently denies severe pain symptoms, he would recommend against proceeding with surgery upfront.    -Patient has chosen to proceed with neoadjuvant chemotherapy  -s/p cycle #1 of taxol/carbo/ Bevacizumab 6/28   -Written information regarding paclitaxel, carboplatin, and bevacizumab, provided at bedside to patient prior.  -Will have follow-up with our office prior to cycle #2  -Orders for weekly CBC x 2 and pre treatment labs for prior to cycle 2 provided to patient at bedside today.   - Family concern regarding patient's ability to mobilize> PT/OT evals pending prior to DC.   Family looking at assisted living options this week.       #Pleural effusions: Most likely secondary to underlying malignancy.   -S/p right thoracentesis 6/26 with 1750 ml removed  -s/p eft thoracentesis 6/27 with 1000 mo removed   -Still requiring O2> home oxygen arranged   -Follow cytology       #History of CAD: Follows with Dr. Pinzon for cardiology. On ASA at home.     # Tachycardia: Episode of heart rate up to  145 overnight.  Now sinus rhythm on monitor.    This case was discussed with Dr. Lancaster and Dr. Quintero.           [1] No Known Allergies  [2]   Current Facility-Administered Medications:     dexamethasone (Decadron) tablet 4 mg, 4 mg, Oral, Q6HRS, Ramona Perry, A.P.R.N., 4 mg at 06/29/25 0514    ondansetron (Zofran ODT) dispertab 4 mg, 4 mg, Oral, Q6HRS, Ramona Perry, A.P.R.N.    acetaminophen (Tylenol) tablet 650 mg, 650 mg, Oral, Q4HRS PRN, Ramona Sommer M.D.    enoxaparin (Lovenox) inj 40 mg, 40 mg, Subcutaneous, DAILY AT 1800, Ramona Sommer M.D., 40 mg at 06/28/25 1725    labetalol (Normodyne/Trandate) injection 10 mg, 10 mg, Intravenous, Q4HRS PRN, Kendrick Acevedo M.D.    ondansetron (Zofran) syringe/vial injection 4 mg, 4 mg, Intravenous, Q4HRS PRN **OR** ondansetron (Zofran ODT) dispertab 4 mg, 4 mg, Oral, Q4HRS PRN, Kendrick Acevedo M.D.    guaiFENesin dextromethorphan (Robitussin DM) 100-10 MG/5ML syrup 10 mL, 10 mL, Oral, Q6HRS PRN, Kendrick Acevedo M.D.    senna-docusate (Pericolace Or Senokot S) 8.6-50 MG per tablet 2 Tablet, 2 Tablet, Oral, Q EVENING, 2 Tablet at 06/28/25 1726 **AND** polyethylene glycol/lytes (Miralax) Packet 1 Packet, 1 Packet, Oral, QDAY PRN, Kendrick Acevedo M.D.    ipratropium-albuterol (DUONEB) nebulizer solution, 3 mL, Nebulization, Q4H PRN (RT), Kendrick Acevedo M.D.    oxyCODONE immediate-release (Roxicodone) tablet 5 mg, 5 mg, Oral, Q3HRS PRN, 5 mg at 06/27/25 2139 **OR** oxyCODONE immediate release (Roxicodone) tablet 10 mg, 10 mg, Oral, Q3HRS PRN, 10 mg at 06/27/25 1604 **OR** HYDROmorphone (Dilaudid) injection 0.5 mg, 0.5 mg, Intravenous, Q3HRS PRN, Kendrick Acevedo M.D., 0.5 mg at 06/27/25 1712    albuterol inhaler 2 Puff, 2 Puff, Inhalation, Q4H PRN (RT), Kendrick Acevedo M.D.    amLODIPine (Norvasc) tablet 5 mg, 5 mg, Oral, DAILY, Ramona Sommer M.D., 5 mg at 06/29/25 0514    aspirin EC tablet 81 mg, 81 mg, Oral, DAILY, Ramona Sommer M.D., 81 mg at 06/29/25 0514     ezetimibe (Zetia) tablet 10 mg, 10 mg, Oral, DAILY, Kendrick Acevedo M.D., 10 mg at 06/29/25 0514    montelukast (Singulair) tablet 10 mg, 10 mg, Oral, QDAY, Kendrick Acevedo M.D., 10 mg at 06/28/25 1726    rosuvastatin (Crestor) tablet 20 mg, 20 mg, Oral, Q EVENING, Kendrick Acevedo M.D., 20 mg at 06/28/25 1725    LORazepam (Ativan) tablet 0.5 mg, 0.5 mg, Oral, Q4HRS PRN, Kendrick Acevedo M.D.

## 2025-06-29 NOTE — FACE TO FACE
Face to Face Supporting Documentation - Home Health    The encounter with this patient was in whole or in part the primary reason for home health admission.    Date of encounter:   Patient:                    MRN:                       YOB: 2025  Sheree Garcia  [id number]  1945     Home health to see patient for:    Skilled Nursing care for assessment, interventions & education, Physical Therapy evaluation and treatment, and Occupational therapy evaluation and treatment  Skilled need for:  Medication Management medical management     Skilled nursing interventions to include:  Comment: medical management     Homebound status evidenced by:  Need the aid of supportive devices such as crutches, canes, wheelchairs or walkers. Leaving home requires a considerable and taxing effort. There is a normal inability to leave the home.    Community Physician to provide follow up care: Kita Miner M.D.     Optional Interventions? No      I certify the face to face encounter for this home health care referral meets the CMS requirements and the encounter/clinical assessment with the patient was, in whole, or in part, for the medical condition(s) listed above, which is the primary reason for home health care. Based on my clinical findings: the service(s) are medically necessary, support the need for home health care, and the homebound criteria are met.  I certify that this patient has had a face to face encounter by myself.  Lisa Díaz M.D. - NPI: [id number]

## 2025-06-29 NOTE — THERAPY
"Physical Therapy   Initial Evaluation     Patient Name:  Sheree Garcia  Age:  80 y.o., Sex:  female  Medical Record #:  [id number]  Today's Date: 6/29/2025     Precautions  Medical: (P)  (4L O2)    Assessment    80 y.o. female was admitted for B pleural effusions and PNA with hypoxic respiratory failure and peritoneal carcinomatosis.  PMHx includes CAD and HTN.  She lives alone in a 2SH with 2 HILARIO and was independent for mobility without an AD.  On eval, she presents with decreased activity tolerance impairing her mobility as detailed below.  She will benefit from continued acute PT services to address the above deficits in order to return home safely.  Recommend home health PT services to address activity tolerance in order for the pt to be able to access her bedroom and full bathroom upstairs.    Plan    Physical Therapy Initial Treatment Plan   Treatment Plan : (P) Bed Mobility, Equipment, Family / Caregiver Training, Gait Training, Manual Therapy, Neuro Re-Education / Balance, Self Care / Home Evaluation, Stair Training, Therapeutic Activities, Therapeutic Exercise  Treatment Frequency: (P) 3 Times per Week  Duration: (P) Until Therapy Goals Met    DC Equipment Recommendations: (P) Front-Wheel Walker  Discharge Recommendations: (P) Recommend home health for continued physical therapy services            Objective       06/29/25 1436   Initial Contact Note    Initial Contact Note Order Received and Verified, Physical Therapy Evaluation in Progress with Full Report to Follow.   Precautions   Medical   (4L O2)   Vitals   O2 (LPM) 4   O2 Delivery Device Nasal Cannula   Pain 0 - 10 Group   Therapist Pain Assessment Post Activity Pain Same as Prior to Activity  (\"discomfort\"- not rated)   Prior Living Situation   Housing / Facility 2 Story House  (bedroom and bathroom 2nd floor, 1/2 bath 1st floor.  Pt reports she is going to put a bed in her kitchen t set up onthe 1st floor.)   Steps Into Home 2  (from " garage)   Steps In Home 14   Rail Both Rail (Steps in Home)  (no rail in garage, could reach the door frame from the bottom of the steps)   Bathroom Set up Bathtub / Shower Combination;Grab Bars   Equipment Owned Adjustable Bed Without Rails   Lives with - Patient's Self Care Capacity Alone and Able to Care For Self   Prior Level of Functional Mobility   Bed Mobility Independent   Transfer Status Independent   Ambulation Independent   Ambulation Distance community   Assistive Devices Used None   Stairs Independent   Cognition    Cognition / Consciousness WDL   Level of Consciousness Alert   Active ROM Upper Body   Comments WFL for mobility   Strength Upper Body   Comments WFL for mobility   Active ROM Lower Body    Active ROM Lower Body  WDL   Strength Lower Body   Lower Body Strength  WDL   Sensation Lower Body   Lower Extremity Sensation   WDL   Coordination Lower Body    Coordination Lower Body  WDL   Bed Mobility    Supine to Sit Supervised   Sit to Supine Supervised   Scooting Supervised   Rolling Supervised   Comments with HOB elevated, rail   Gait Analysis   Gait Level Of Assist Standby Assist   Assistive Device Front Wheel Walker   Distance (Feet) 170   # of Times Distance was Traveled 1   Deviation Bradykinetic   # of Stairs Climbed 2  (step to, B rails)   Level of Assist with Stairs Standby Assist   Functional Mobility   Sit to Stand Standby Assist   Bed, Chair, Wheelchair Transfer Supervised   Transfer Method Stand Step   Mobility with FWW   Activity Tolerance   Sitting in Chair refused d/t fatigue /p ambulation   Edema / Skin Assessment   Edema / Skin  Not Assessed   Short Term Goals    Short Term Goal # 1 Pt will perform supine < > sit SPV with bed flat, no rail in 6 visits in order to set up for upright mobility   Short Term Goal # 2 Pt will perform sit < > stand SPV in 6 visits in order to prepare for ambulation   Short Term Goal # 3 Pt will ambulate 250' SPV /c FWW in 6 visits in order to progress  [facility] access for doctors' appointments   Short Term Goal # 4 Pt will ascend/ descend 14 steps SPV in 6 visits in order to access her bedroom and full bath.   Education Group   Education Provided Role of Physical Therapist;Stair Training;Transfer Status   Role of Physical Therapist Patient Response Patient;Acceptance;Explanation;Action Demonstration   Stair Training Patient Response Patient;Acceptance;Explanation;Action Demonstration;Reinforcement Needed   Transfer Status Patient Response Patient;Acceptance;Explanation;Action Demonstration;Reinforcement Needed   Additional Comments home O2 and awareness of O2 line with mobility- information acknowledged   Physical Therapy Initial Treatment Plan    Treatment Plan  Bed Mobility;Equipment;Family / Caregiver Training;Gait Training;Manual Therapy;Neuro Re-Education / Balance;Self Care / Home Evaluation;Stair Training;Therapeutic Activities;Therapeutic Exercise   Treatment Frequency 3 Times per Week   Duration Until Therapy Goals Met   Problem List    Problems Impaired Bed Mobility;Impaired Transfers;Impaired Ambulation;Decreased Activity Tolerance   Anticipated Discharge Equipment and Recommendations   DC Equipment Recommendations Front-Wheel Walker   Discharge Recommendations Recommend home health for continued physical therapy services   Interdisciplinary Plan of Care Collaboration   IDT Collaboration with  Nursing   Patient Position at End of Therapy In Bed;Call Light within Reach;Tray Table within Reach;Family / Friend in Room  (great nephew in the room)   Collaboration Comments RN updated, PT recs   Session Information   Date / Session Number  6/29 -1 (1/3, 7/5)

## 2025-06-29 NOTE — PROGRESS NOTES
NOC ApRN CROSS COVER NOTE    Notified by bedside RN of tachycardia noted on pulse ox with rate up to 145. EKG ordered, shows NSR rate of 79. Will place back on tele for 24 hrs for additional monitoring.    Evelin Palomino, MSN, APRN  [facility] Center[facility]    Please note this dictation was created using voice recognition software.  I have made every reasonable attempt to correct obvious errors, but there may be errors of grammar and possibly content that I did not discover before finalizing the note.

## 2025-06-29 NOTE — PROGRESS NOTES
Patient -150, /80 on call hospitalist made aware. New orders received, place patient on continues cardiac monitoring and stat EKG ordered. Per monitor tech patient now SR HR 80's. Continue with plan of care.

## 2025-06-29 NOTE — DISCHARGE INSTRUCTIONS
-Patient to take Dexamethasone 4 mg 2 tabs the night before chemotherapy and the morning of chemotherapy.  -Patient to take Dexamethasone 4 mg every 3 hours and alternate with Zofran 4 mg every 3 hours, for example take Dexamethasone 4 mg @ 08:00 and then Zofran 4 mg @ 11:00 am then dexamethasone 4 mg @ 14:00 then Zofran @ 17:00 etc. You do not need to get up in the middle of the night to take these meds unless you are nauseous. Take these medications from day #2- Day #5 post chemotherapy. If you still have nausea and vomiting despite taking this medications please notify us.  Our office phone number is [phone].   - Obtain weekly CBC to monitor blood counts following chemo.   -You must get blood work 3-4 days prior to your next chemotherapy (CBC, CMP, UA, UPCR and CA-125).   -If you have fever and nausea and vomiting and not feeling well, you are welcome to contact us anytime.  Call us if you develop a fever.  -If you have constipation take OTC medications such as Miralax, if this is not effective after 24 hours take milk of magnesia or Dulcolax  -If you develop persistent diarrhea lasting more than 24 hours call our office prior to taking any over-the-counter antidiarrheals  -You may eat your normal diet, however avoid uncooked or undercooked meat, seafood, or unpasteurized dairy.  Wash fruits and vegetables  -Take over-the-counter pain relievers such as ibuprofen or acetaminophen as needed for pain.  Call our office if you have inadequate pain control with over-the-counter pain medications.   - Do not have any dental cleanings or invasive procedures such as tooth extractions or surgical procedures without discussing these with our office.  -Monitor your blood pressure at home.  If your blood pressure readings are consistently greater than 140/90 you may need to see your primary care provider to add or adjust medications to control blood pressure.

## 2025-06-29 NOTE — CARE PLAN
The patient is Stable - Low risk of patient condition declining or worsening    Shift Goals  Clinical Goals: monitor VS, safety, pain control  Patient Goals: cluster care, rest  Family Goals: Updates on POC    Progress made toward(s) clinical / shift goals:    Problem: Knowledge Deficit - Standard  Goal: Patient and family/care givers will demonstrate understanding of plan of care, disease process/condition, diagnostic tests and medications  Outcome: Progressing     Problem: Pain - Standard  Goal: Alleviation of pain or a reduction in pain to the patient’s comfort goal  Outcome: Progressing       Patient is not progressing towards the following goals:

## 2025-06-29 NOTE — THERAPY
Occupational Therapy   Initial Evaluation     Patient Name:  Sheree Garcia  Age:  80 y.o., Sex:  female  Medical Record #:  [id number]  Today's Date:  6/29/2025     Precautions  Medical: Fall Risk    Assessment  Patient is 80 y.o. female admitted with worsening SOB. Pt diagnosed with peritoneal carcinomatosis and BLE edema. PMHx of CAD and HLD. Pt seen for OT eval and tx. Pt currently resides alone in a 2-story house and was independent with ADLs and IADLs.    During OT eval, pt presented with deficits in self-care tasks, balance, functional mobility, strength, and activity tolerance. She required SBA-CGA to complete ADLs, functional mobility, and txfs with FWW. Pt was provided education on role of acute OT, home safety, compensatory strategies to safely complete ADLs, and importance of frequent EOB/OOB ADLs to reduce risk of deconditioning. Currently recommend home health for further OT services after DC. Will continue to follow for ongoing acute OT services.    Plan    Occupational Therapy Initial Treatment Plan   Treatment Interventions: Self Care / Activities of Daily Living, Adaptive Equipment, Neuro Re-Education / Balance, Therapeutic Exercises, Therapeutic Activity  Treatment Frequency: 3 Times per Week  Duration: Until Therapy Goals Met    DC Equipment Recommendations: Unable to determine at this time  Discharge Recommendations: Recommend home health for continued occupational therapy services      Objective    Prior Living Situation   Prior Services Home-Independent   Housing / Facility 2 Story House   Steps In Home   (Full flight, however, pt reported that family is moving her to the ground floor.)   Bathroom Set up   (Pt reported that there is no shower on the ground floor. Reported that she will likely sponge bath)   Equipment Owned Adjustable Bed Without Rails   Lives with - Patient's Self Care Capacity Alone and Able to Care For Self   Comments Pt currently resides alone. Pt reported that he  son lives local and can assist as needed. Pt has additional support from her daughter who visits very frequently from [place], [place].   Prior Level of ADL Function   Self Feeding Independent   Grooming / Hygiene Independent   Bathing Independent   Dressing Independent   Toileting Independent   Prior Level of IADL Function   Medication Management Independent  (Reported that her DIL will help manage meds upon DC.)   Laundry Independent   Kitchen Mobility Independent   Finances Independent   Home Management Independent  (Reported that her daughter will assist with housekeeping tasks after DC.)   Shopping Requires Assist  (Reported that her son assist with grocery shopping)   Prior Level Of Mobility Independent Without Device in Community;Independent Without Device in Home   Driving / Transportation Driving Independent   History of Falls   History of Falls No   Date of Last Fall   (Denied having any falls within the past 6 months)   Vitals   O2 (LPM) 4   O2 Delivery Device Nasal Cannula   Vitals Comments Edu on line management with functional mobility to reduce risk of falls   Pain 0 - 10 Group   Therapist Pain Assessment Post Activity Pain Same as Prior to Activity;Nurse Notified  (Not rated, agreeable to activity)   Cognition    Cognition / Consciousness WDL   Level of Consciousness Alert   Comments Very pleasant, cooperative, and receptive to education   Active ROM Upper Body   Active ROM Upper Body  WDL   Strength Upper Body   Upper Body Strength  X   Gross Strength Generalized Weakness, Equal Bilaterally.    Balance Assessment   Sitting Balance (Static) Fair   Sitting Balance (Dynamic) Fair   Standing Balance (Static) Fair   Standing Balance (Dynamic) Fair -   Weight Shift Sitting Fair   Weight Shift Standing Fair   Comments w/FWW   Bed Mobility    Supine to Sit Standby Assist   Sit to Supine Standby Assist   Scooting Standby Assist   Comments HOB elevated per pt prefence; has adjustable bed at home   ADL Assessment    Eating Supervision   Grooming Contact Guard Assist;Standing   Lower Body Dressing Contact Guard Assist  (Able to tailor sit to manage socks)   Toileting   (Reported that she had completed toileting ADLs prior to start of session.)   How much help from another person does the patient currently need...   6 Clicks Daily Activity Score 20   Functional Mobility   Sit to Stand Standby Assist   Bed, Chair, Wheelchair Transfer Contact Guard Assist   Mobility Functional mobility in room; w/FWW   Activity Tolerance   Sitting Edge of Bed 5 min   Standing 5 min   Comments Limited by fatigue   Patient / Family Goals   Patient / Family Goal #1 To go home   Short Term Goals   Short Term Goal # 1 Pt will complete ADL txfs with supv   Short Term Goal # 2 Pt will complete LB dressing with supv using AE PRN   Short Term Goal # 3 Pt will complete toileting ADLs with supv   Short Term Goal # 4 Pt will complete standing g/h routine with supv   Education Group   Education Provided Home Safety;Role of Occupational Therapist;Activities of Daily Living;Pathology of bedrest   Role of Occupational Therapist Patient Response Patient;Acceptance;Explanation;Verbal Demonstration   Home Safety Patient Response Patient;Acceptance;Explanation;Verbal Demonstration;Reinforcement Needed   ADL Patient Response Patient;Acceptance;Explanation;Verbal Demonstration;Action Demonstration;Reinforcement Needed   Pathology of Bedrest Patient Response Patient;Acceptance;Explanation;Verbal Demonstration;Action Demonstration;Reinforcement Needed

## 2025-06-29 NOTE — PROGRESS NOTES
"Hospital Medicine Daily Progress Note    Date of Service  6/29/2025    Chief Complaint  Sheree Garcia is a 80 y.o. female admitted 6/25/2025 with worsening shortness of breath    Hospital Course  Sheree Garcia is an 80-year-old female with PMHx CAD, HLD.  Admitted 6/25 for shortness of breath.    Per history: Patient developed severe cough several days ago.  This has been associated with shortness of breath.  Patient states she was diagnosed with \"fluid on the lung\" a few days ago.  In the ED: CTA chest: Negative for PE.  Notable large bilateral pleural effusions associated with atelectasis.  CTA A/P: Extensive peritoneal carcinomatosis with large volume ascites.  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    Initially, patient was requiring BiPAP.  She was then successfully weaned to 2 L/min via nasal cannula.  ICU was consulted but in agreement with medical floor for now.  Patient will undergo thoracentesis this a.m.  Dr. Lancaster with gyn oncology was also consulted.     Interval Problem Update  6/26: Vitals notable for improvement in tachycardia.  Heart rate now in the 80s from 120's in the ED.  Tachypnea is also improving.  SBP ranging 121 through 141.  Patient is on 3 L/min supplemental O2.  WBC 7 from 9.  Hb 11 from 12.  Planning for thoracentesis today. Holding DVT prophylaxis while possible surgical planning occurs.     6/27: Vitals notable for persistent tachycardia.  Heart rate ranging .  Tachypnea is present.   through 145.  Patient remains on 3 L/min supplemental O2.   Discussed with HÉCTOR Minaya.  No surgical interventions are planned at this time.  Patient agreeable with neoadjuvant chemotherapy.  Planning to start tomorrow.  1 day course.      6/28:  -- Patient is alert, awake, answering questions appropriate, vital sign has been reviewed, patient family is noted to be at bedside.  Dr. Tonny Perry went and evaluate the patient, plan " is to start chemo today  Labs reviewed, sodium at 133, hemoglobin 11.7, platelet 448.  Patient reports that her abdominal distention has gotten better as well as her breathing, will obtain PT/OT, will obtain home O2 evaluation.    Plan of care  has been discussed with Gyn/onc  Pressure breathing has gotten better, she underwent thoracentesis on 6/27 and had 1 L of fluid removed; s/p paracentesis and had 2 L of fluid removed.    6/29:  -- Patient is alert, awake, answering question appropriately, medicine has been reviewed, overnight, patient is noted to be tachycardic, EKG showed sinus rhythm, continue to monitor the patient entirely.  Will walk the patient in the hallway to see what her heart rate.  PT/OT has been ordered.  Will obtain home health  Oxygen has been delivered  GYN/onc has evaluated, plan is to discharge the patient once PT and OT has evaluated the patient  Labs reviewed, sodium 134, H/H 15/36 WBC at 11.1,; platelet 519, monitor    I have discussed this patient's plan of care and discharge plan at IDT rounds today with Case Management, Nursing, Nursing leadership, and other members of the IDT team.    Consultants/Specialty  critical care and gyn oncology     Code Status  DNAR/DNI    Disposition  The patient is not medically cleared for discharge to home or a post-acute facility.      I have placed the appropriate orders for post-discharge needs.    Review of Systems  Review of Systems   Constitutional:  Positive for malaise/fatigue. Negative for fever.   Respiratory:  Positive for shortness of breath. Negative for cough.    Cardiovascular:  Positive for leg swelling. Negative for chest pain.   Gastrointestinal:  Positive for abdominal pain. Negative for nausea and vomiting.        Physical Exam  Temp:  [36.1 °C (97 °F)-36.6 °C (97.8 °F)] 36.4 °C (97.5 °F)  Pulse:  [] 83  Resp:  [16-17] 16  BP: (113-120)/(58-80) 113/66  SpO2:  [90 %-96 %] 90 %    Physical Exam  Vitals and nursing note reviewed.    Constitutional:       General: She is not in acute distress.     Appearance: Normal appearance. She is ill-appearing.   Cardiovascular:      Rate and Rhythm: Normal rate and regular rhythm.   Pulmonary:      Effort: Pulmonary effort is normal.      Breath sounds: Normal breath sounds.   Abdominal:      General: Bowel sounds are normal. There is distension.      Tenderness: There is no abdominal tenderness.      Comments: Moderate abdominal distention is noted   Skin:     General: Skin is warm and dry.      Coloration: Skin is pale.   Neurological:      Mental Status: She is alert and oriented to person, place, and time.      Motor: Weakness present.   Psychiatric:         Mood and Affect: Mood normal.         Behavior: Behavior normal.         Fluids    Intake/Output Summary (Last 24 hours) at 6/29/2025 1124  Last data filed at 6/29/2025 0349  Gross per 24 hour   Intake --   Output 1150 ml   Net -1150 ml        Laboratory  Recent Labs     06/27/25  0636 06/28/25  0510 06/29/25  0950   WBC 7.8 7.0 11.1*   RBC 4.58 4.69 4.49   HEMOGLOBIN 11.4* 11.7* 11.4*   HEMATOCRIT 36.9* 37.6 35.5*   MCV 80.6* 80.2* 79.1*   MCH 24.9* 24.9* 25.4*   MCHC 30.9* 31.1* 32.1*   RDW 43.8 43.6 42.8   PLATELETCT 516* 448* 519*   MPV 8.8* 9.2 9.3     Recent Labs     06/27/25  0636 06/28/25  0510 06/29/25  0950   SODIUM 134* 133* 134*   POTASSIUM 3.7 4.4 4.4   CHLORIDE 96 95* 97   CO2 25 26 25   GLUCOSE 156* 151* 176*   BUN 12 15 18   CREATININE 0.49* 0.54 0.54   CALCIUM 8.7 9.0 8.6                     Imaging  DX-CHEST-PORTABLE (1 VIEW)   Final Result      1.  Hypoinflation with mild pulmonary edema.   2.  Worsening LEFT lung base consolidation and small pleural effusion.      DX-CHEST-PORTABLE (1 VIEW)   Final Result      1.  Interval improvement of LEFT pleural fluid.   2.  No pneumothorax.   3.  Persistent hypoinflation with bibasilar atelectasis.      US-THORACENTESIS PUNCTURE LEFT   Final Result      1. Real-time Ultrasound guided  left sided therapeutic and diagnostic thoracentesis.      2. 1000 mL of fluid withdrawn.      DX-CHEST-PORTABLE (1 VIEW)   Final Result      1.  Extensive left perihilar infiltrate consistent with pneumonitis.   2.  Small left pleural effusion with left lateral pleural thickening.      US-PARACENTESIS, ABD WITH IMAGING   Final Result      1. Ultrasound-guided therapeutic and diagnostic paracentesis of the left lower quadrant of the abdominal wall.      2. 2050 mL of fluid withdrawn.      US-THORACENTESIS PUNCTURE RIGHT   Final Result      1. Ultrasound guided right sided therapeutic and diagnostic thoracentesis.      2. 1750 mL of fluid withdrawn.      US-EXTREMITY VENOUS LOWER BILAT   Final Result      CT-ABDOMEN-PELVIS WITH   Final Result      1.  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   2.  Extensive peritoneal carcinomatosis with large volume ascites.   3.  Partially visualized large bilateral pleural effusions. Malignant pleural effusions are possible.      CT-CTA CHEST PULMONARY ARTERY W/ RECONS   Final Result      1.  No CT evidence for pulmonary emboli.   2.  Large bilateral pleural effusions with associated atelectasis.   3.  Ascites with findings concerning for peritoneal tumor implants in the upper abdomen.               DX-CHEST-PORTABLE (1 VIEW)   Final Result      1.  Worsening pulmonary edema.   2.  Worsening bilateral pleural effusions, larger on the RIGHT.   3.  No pneumothorax.           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 Lancaster following  - Patient agreeable to a short course of chemotherapy; she is planning 3 cycles.    Patient had 4 cycles of chemo on 6/28  Plan is to discharge the patient after PT/OT evaluation.    Bilateral leg  edema  Assessment & Plan  Lower extremity US: Negative for DVT  - Diuresis with Lasix   - Close monitoring for hypotension and urinary output    Cancer associated pain  Assessment & Plan  Supportive pain control    Hypertension  Assessment & Plan  IV lasix for now  Resume amlodipine when appropriate    CAD (coronary artery disease)  Assessment & Plan  Followed by Dr. Coleman  No history of stent or CABG  - Holding ASA for possible surgery  - Continue statin and Zetia    Pneumonia due to infectious organism  Assessment & Plan  Concern for malignancy, large bilateral pleural effusions, as well as need for intra-abdominal coverage  - Discontinue IV Zosyn no obvious signs of infection or underlying bacterial pneumonia   - Continue monitor closely  - Follow thoracentesis fluid analysis     Acute respiratory failure with hypoxia (HCC)  Assessment & Plan  Secondary to bilateral pleural effusions  Initially requiring BiPAP in the ED  Currently on 2 L/min supplemental O2  - Patient does not In Place    Malignant ascites  Assessment & Plan  Paracentesis 6/26 with 2.7L removed    Malignant pleural effusion  Assessment & Plan  Large bilateral pleural effusions noted on CTA chest  Thought to be malignant etiology    Trial of diuresis  Right thora 6/26 with 2L removed  Thoracentesis on 6/27 and ahd 1 L of fluid removed         VTE prophylaxis: lovenox    I have performed a physical exam and reviewed and updated ROS and Plan today (6/29/2025). In review of yesterday's note (6/28/2025), there are no changes except as documented above.

## 2025-06-30 ENCOUNTER — PHARMACY VISIT (OUTPATIENT)
Dept: PHARMACY | Facility: MEDICAL CENTER | Age: 80
End: 2025-06-30
Payer: COMMERCIAL

## 2025-06-30 VITALS
SYSTOLIC BLOOD PRESSURE: 127 MMHG | TEMPERATURE: 97.8 F | DIASTOLIC BLOOD PRESSURE: 67 MMHG | OXYGEN SATURATION: 94 % | RESPIRATION RATE: 18 BRPM | WEIGHT: 172 LBS | BODY MASS INDEX: 29.37 KG/M2 | HEART RATE: 66 BPM | HEIGHT: 64 IN

## 2025-06-30 LAB
ALBUMIN SERPL BCP-MCNC: 2.8 G/DL (ref 3.2–4.9)
ALBUMIN/GLOB SERPL: 0.8 G/DL
ALP SERPL-CCNC: 112 U/L (ref 30–99)
ALT SERPL-CCNC: 56 U/L (ref 2–50)
ANION GAP SERPL CALC-SCNC: 10 MMOL/L (ref 7–16)
AST SERPL-CCNC: 67 U/L (ref 12–45)
BACTERIA BLD CULT: NORMAL
BACTERIA BLD CULT: NORMAL
BACTERIA FLD AEROBE CULT: NORMAL
BILIRUB SERPL-MCNC: <0.2 MG/DL (ref 0.1–1.5)
BUN SERPL-MCNC: 21 MG/DL (ref 8–22)
CALCIUM ALBUM COR SERPL-MCNC: 9.7 MG/DL (ref 8.5–10.5)
CALCIUM SERPL-MCNC: 8.7 MG/DL (ref 8.5–10.5)
CHLORIDE SERPL-SCNC: 96 MMOL/L (ref 96–112)
CO2 SERPL-SCNC: 28 MMOL/L (ref 20–33)
CREAT SERPL-MCNC: 0.53 MG/DL (ref 0.5–1.4)
ERYTHROCYTE [DISTWIDTH] IN BLOOD BY AUTOMATED COUNT: 43.7 FL (ref 35.9–50)
GFR SERPLBLD CREATININE-BSD FMLA CKD-EPI: 93 ML/MIN/1.73 M 2
GLOBULIN SER CALC-MCNC: 3.3 G/DL (ref 1.9–3.5)
GLUCOSE SERPL-MCNC: 131 MG/DL (ref 65–99)
GRAM STN SPEC: NORMAL
HCT VFR BLD AUTO: 36.7 % (ref 37–47)
HGB BLD-MCNC: 11.4 G/DL (ref 12–16)
MCH RBC QN AUTO: 25 PG (ref 27–33)
MCHC RBC AUTO-ENTMCNC: 31.1 G/DL (ref 32.2–35.5)
MCV RBC AUTO: 80.5 FL (ref 81.4–97.8)
PLATELET # BLD AUTO: 496 K/UL (ref 164–446)
PMV BLD AUTO: 9.8 FL (ref 9–12.9)
POTASSIUM SERPL-SCNC: 4.4 MMOL/L (ref 3.6–5.5)
PROT SERPL-MCNC: 6.1 G/DL (ref 6–8.2)
RBC # BLD AUTO: 4.56 M/UL (ref 4.2–5.4)
SIGNIFICANT IND 70042: NORMAL
SITE SITE: NORMAL
SODIUM SERPL-SCNC: 134 MMOL/L (ref 135–145)
SOURCE SOURCE: NORMAL
WBC # BLD AUTO: 8.5 K/UL (ref 4.8–10.8)

## 2025-06-30 PROCEDURE — RXMED WILLOW AMBULATORY MEDICATION CHARGE: Performed by: HOSPITALIST

## 2025-06-30 PROCEDURE — 85027 COMPLETE CBC AUTOMATED: CPT

## 2025-06-30 PROCEDURE — 700111 HCHG RX REV CODE 636 W/ 250 OVERRIDE (IP)

## 2025-06-30 PROCEDURE — 700102 HCHG RX REV CODE 250 W/ 637 OVERRIDE(OP): Performed by: STUDENT IN AN ORGANIZED HEALTH CARE EDUCATION/TRAINING PROGRAM

## 2025-06-30 PROCEDURE — A9270 NON-COVERED ITEM OR SERVICE: HCPCS | Performed by: STUDENT IN AN ORGANIZED HEALTH CARE EDUCATION/TRAINING PROGRAM

## 2025-06-30 PROCEDURE — 700111 HCHG RX REV CODE 636 W/ 250 OVERRIDE (IP): Mod: JZ | Performed by: HOSPITALIST

## 2025-06-30 PROCEDURE — 700102 HCHG RX REV CODE 250 W/ 637 OVERRIDE(OP)

## 2025-06-30 PROCEDURE — 80053 COMPREHEN METABOLIC PANEL: CPT

## 2025-06-30 PROCEDURE — 99239 HOSP IP/OBS DSCHRG MGMT >30: CPT | Performed by: HOSPITALIST

## 2025-06-30 RX ORDER — ONDANSETRON 4 MG/1
4 TABLET, ORALLY DISINTEGRATING ORAL EVERY 6 HOURS
Qty: 10 TABLET | Refills: 0 | Status: SHIPPED | OUTPATIENT
Start: 2025-06-30

## 2025-06-30 RX ORDER — OXYCODONE HYDROCHLORIDE 5 MG/1
5 TABLET ORAL EVERY 6 HOURS PRN
Qty: 20 TABLET | Refills: 0 | Status: SHIPPED | OUTPATIENT
Start: 2025-06-30 | End: 2025-07-05

## 2025-06-30 RX ORDER — DEXAMETHASONE 4 MG/1
TABLET ORAL
Qty: 30 TABLET | Refills: 0 | Status: SHIPPED | OUTPATIENT
Start: 2025-06-30 | End: 2025-06-30

## 2025-06-30 RX ORDER — DEXAMETHASONE 2 MG/1
2 TABLET ORAL EVERY 6 HOURS
Qty: 16 TABLET | Refills: 0 | Status: SHIPPED | OUTPATIENT
Start: 2025-06-30 | End: 2025-07-04

## 2025-06-30 RX ORDER — POLYETHYLENE GLYCOL 3350 17 G/17G
17 POWDER, FOR SOLUTION ORAL
Qty: 15 EACH | Refills: 0 | Status: SHIPPED | OUTPATIENT
Start: 2025-06-30 | End: 2025-07-15

## 2025-06-30 RX ORDER — AMOXICILLIN 250 MG
2 CAPSULE ORAL EVERY EVENING
Qty: 30 TABLET | Refills: 0 | Status: SHIPPED | OUTPATIENT
Start: 2025-06-30

## 2025-06-30 RX ADMIN — DEXAMETHASONE 4 MG: 4 TABLET ORAL at 10:41

## 2025-06-30 RX ADMIN — ONDANSETRON 4 MG: 4 TABLET, ORALLY DISINTEGRATING ORAL at 09:03

## 2025-06-30 RX ADMIN — FUROSEMIDE 20 MG: 10 INJECTION, SOLUTION INTRAVENOUS at 05:39

## 2025-06-30 RX ADMIN — ONDANSETRON 4 MG: 4 TABLET, ORALLY DISINTEGRATING ORAL at 03:12

## 2025-06-30 RX ADMIN — POLYETHYLENE GLYCOL 3350 1 PACKET: 17 POWDER, FOR SOLUTION ORAL at 05:39

## 2025-06-30 RX ADMIN — DEXAMETHASONE 4 MG: 4 TABLET ORAL at 05:40

## 2025-06-30 RX ADMIN — ASPIRIN 81 MG: 81 TABLET, COATED ORAL at 05:39

## 2025-06-30 RX ADMIN — EZETIMIBE 10 MG: 10 TABLET ORAL at 05:39

## 2025-06-30 RX ADMIN — AMLODIPINE BESYLATE 5 MG: 5 TABLET ORAL at 05:39

## 2025-06-30 RX ADMIN — DEXAMETHASONE 4 MG: 4 TABLET ORAL at 00:45

## 2025-06-30 ASSESSMENT — PAIN DESCRIPTION - PAIN TYPE: TYPE: ACUTE PAIN

## 2025-06-30 NOTE — DISCHARGE SUMMARY
"Discharge Summary    CHIEF COMPLAINT ON ADMISSION  Chief Complaint   Patient presents with    Shortness of Breath     Pt reports recent diagnosis of \"fluid on lung\", taking ATB. Pt reports worsening SOB and cough.       Reason for Admission  SOB     Admission Date  6/25/2025    CODE STATUS  DNAR/DNI    HPI & HOSPITAL COURSE    This is a 80-year-old female with a past medical significant for CAD, hyperlipidemia was admitted on 6/25 with complaint of shortness of breath.  Patient stated that she was diagnosed fluid in her lungs a few days ago.  In ED, CTA chest negative for PE multiple large bilateral pleural effusion associated with atelectasis.  CTAP extensive peritoneal carcinomatosis with large volume ascites and large 20 cm multiloculated cystic ovarian neoplasm in the mid pelvis.    Because of her shortness of breath, and associated current fever, nausea on 2 to 3 L of oxygen.  During the stay in the hospital, patient had 1.7 L of fluid removed from the right side, 1 L from the left side of the lung and 2 L of of ascitic fluid.  Patient shortness of breath has gotten better, home O2 evaluation was obtained, patient will be discharged home on oxygen along with home health.  Of note given oncology continue to follow, patient did receive  #1 of taxol/carbo/ Bevacizumab 6/28/2025.    She will receive cycle 2 in 2 weeks at their office.  I have discussed the plan of care with the patient family in detail.  At this time patient medically stable to be discharged home on oxygen.  For all other chronic medical condition, patient resume her home medication.    Therefore, she is discharged in good and stable condition to home with organized home healthcare and close outpatient follow-up.    The patient met 2-midnight criteria for an inpatient stay at the time of discharge.    Discharge Date  6/30/2025      FOLLOW UP ITEMS POST DISCHARGE  Kita Miner M.D.  Please follow up  with Gyn/Onc asan op      DISCHARGE " DIAGNOSES  Principal Problem:    Peritoneal carcinomatosis (HCC) (POA: Unknown)  Active Problems:    Malignant pleural effusion (POA: Unknown)    Malignant ascites (POA: Unknown)    Acute respiratory failure with hypoxia (HCC) (POA: Unknown)    Pneumonia due to infectious organism (POA: Unknown)    CAD (coronary artery disease) (POA: Unknown)    Hypertension (POA: Unknown)    Cancer associated pain (POA: Unknown)    Bilateral leg edema (POA: Unknown)  Resolved Problems:    Pleural effusion (POA: Yes)    ACP (advance care planning) (POA: Unknown)      FOLLOW UP  Future Appointments   Date Time Provider Department Center   7/7/2025  9:20 AM NELL MUKHERJEE CT [address] [facility]  [address]  [place] [address]  [phone]        [facility]  [address] [place] [id number] [phone]  Follow up in 1 week(s)        MEDICATIONS ON DISCHARGE     Medication List        START taking these medications        Instructions   dexamethasone 2 MG tablet  Commonly known as: Decadron   Doctor's comments:    Take 1 Tablet by mouth every 6 hours for 4 days.  Dose: 2 mg     ondansetron 4 MG Tbdp  Commonly known as: Zofran ODT   Take 1 Tablet by mouth every 6 hours.  Dose: 4 mg     oxyCODONE immediate-release 5 MG Tabs  Commonly known as: Roxicodone   Take 1 Tablet by mouth every 6 hours as needed for Severe Pain for up to 5 days.  Dose: 5 mg     polyethylene glycol/lytes 17 g Pack  Commonly known as: Miralax   Mix 1 packet and drink by mouth 1 time a day as needed (if no bowel movement in last 2 days) for up to 15 days.  (Mix 1 packet and drink by mouth 1 time a day as needed (if no bowel movement in last 2 days) for up to 15 days.)  Dose: 17 g     senna-docusate 8.6-50 MG Tabs  Commonly known as: Pericolace Or Senokot S   Take 2 Tablets by mouth every evening.  Dose: 2 Tablet            CONTINUE taking these medications        Instructions   albuterol 108 (90 Base)  MCG/ACT Aers inhalation aerosol   albuterol sulfate HFA 90 mcg/actuation aerosol inhaler   Inhale 2 puffs every 6-8 hours by inhalation route.     amLODIPine 5 MG Tabs  Commonly known as: Norvasc   Take 5 mg by mouth every day.  Dose: 5 mg     aspirin 81 MG EC tablet   Take 81 mg by mouth every day.  Dose: 81 mg     ezetimibe 10 MG Tabs  Commonly known as: Zetia   Take 10 mg by mouth every day.  Dose: 10 mg     montelukast 10 MG Tabs  Commonly known as: Singulair   Take 10 mg by mouth every day.  Dose: 10 mg     rosuvastatin 20 MG Tabs  Commonly known as: Crestor   Take 20 mg by mouth every evening.  Dose: 20 mg            STOP taking these medications      cefdinir 300 MG Caps  Commonly known as: Omnicef     meclizine 25 MG Tabs  Commonly known as: Antivert              Allergies  Allergies[1]    DIET  Orders Placed This Encounter   Procedures    Diet Order Diet: Regular     Standing Status:   Standing     Number of Occurrences:   1     Diet::   Regular [1]       ACTIVITY  As tolerated.  Weight bearing as tolerated    CONSULTATIONS  Gyn/nc  Critical   care    PROCEDURES  Paracentesis and thoracentesis    LABORATORY  Lab Results   Component Value Date    SODIUM 134 (L) 06/30/2025    POTASSIUM 4.4 06/30/2025    CHLORIDE 96 06/30/2025    CO2 28 06/30/2025    GLUCOSE 131 (H) 06/30/2025    BUN 21 06/30/2025    CREATININE 0.53 06/30/2025        Lab Results   Component Value Date    WBC 8.5 06/30/2025    HEMOGLOBIN 11.4 (L) 06/30/2025    HEMATOCRIT 36.7 (L) 06/30/2025    PLATELETCT 496 (H) 06/30/2025        Total time of the discharge process exceeds 39 minutes.       [1] No Known Allergies

## 2025-06-30 NOTE — PROGRESS NOTES
Discharge instructions reviewed and signed, all questions answered, PIV removed on unit, Meds to Beds delivered, tamper evident seal in place, home with own oxygen.

## 2025-06-30 NOTE — CARE PLAN
The patient is Watcher - Medium risk of patient condition declining or worsening    Shift Goals  Clinical Goals: bowel movement, rest, ambulate  Patient Goals: rest, discharge in AM  Family Goals: None Present    Progress made toward(s) clinical / shift goals:      Problem: Knowledge Deficit - Standard  Goal: Patient and family/care givers will demonstrate understanding of plan of care, disease process/condition, diagnostic tests and medications  Outcome: Progressing     Problem: Pain - Standard  Goal: Alleviation of pain or a reduction in pain to the patient’s comfort goal  Outcome: Progressing     Problem: Fall Risk  Goal: Patient will remain free from falls  Outcome: Progressing

## 2025-07-01 ENCOUNTER — TELEPHONE (OUTPATIENT)
Dept: HOME HEALTH SERVICES | Facility: HOME HEALTHCARE | Age: 80
End: 2025-07-01
Payer: MEDICARE

## 2025-07-01 NOTE — TELEPHONE ENCOUNTER
Called patient regarding HH scheduling and left VM. Called patient's son and stated number is not in service. Spouse called back. Scheduled start of care for 7/2 between 9-10am.

## 2025-07-02 ENCOUNTER — HOME CARE VISIT (OUTPATIENT)
Dept: HOME HEALTH SERVICES | Facility: HOME HEALTHCARE | Age: 80
End: 2025-07-02
Payer: MEDICARE

## 2025-07-02 ENCOUNTER — TELEPHONE (OUTPATIENT)
Dept: HOME HEALTH SERVICES | Facility: HOME HEALTHCARE | Age: 80
End: 2025-07-02
Payer: MEDICARE

## 2025-07-02 VITALS
DIASTOLIC BLOOD PRESSURE: 64 MMHG | OXYGEN SATURATION: 98 % | HEART RATE: 84 BPM | SYSTOLIC BLOOD PRESSURE: 120 MMHG | TEMPERATURE: 98.4 F | RESPIRATION RATE: 20 BRPM

## 2025-07-02 PROCEDURE — G0493 RN CARE EA 15 MIN HH/HOSPICE: HCPCS

## 2025-07-02 PROCEDURE — 665998 HH PPS REVENUE CREDIT

## 2025-07-02 PROCEDURE — 665999 HH PPS REVENUE DEBIT

## 2025-07-02 PROCEDURE — 665001 SOC-HOME HEALTH

## 2025-07-02 PROCEDURE — 665005 NO-PAY RAP - HOME HEALTH

## 2025-07-02 SDOH — ECONOMIC STABILITY: HOUSING INSECURITY: EVIDENCE OF SMOKING MATERIAL: 0

## 2025-07-02 ASSESSMENT — ENCOUNTER SYMPTOMS
PAIN LOCATION - PAIN FREQUENCY: CONSTANT
PAIN LOCATION - PAIN QUALITY: ACHE
LOWEST PAIN SEVERITY IN PAST 24 HOURS: 0/10
CONSTIPATION: 1
VOMITING: DENIES
NAUSEA: DENIES
DRY SKIN: 1
HIGHEST PAIN SEVERITY IN PAST 24 HOURS: 7/10
PAIN SEVERITY GOAL: 0/10
PAIN LOCATION - RELIEVING FACTORS: ELEVATION
PAIN: 1
PAIN LOCATION - PAIN SEVERITY: 3/10
FATIGUES EASILY: 1
PAIN LOCATION - PAIN DURATION: CHRONIC
DEBILITATING PAIN: 1
SUBJECTIVE PAIN PROGRESSION: UNCHANGED
TROUBLE SWALLOWING: 1
PAIN LOCATION: BILATERAL FEET
HEADACHES: 1

## 2025-07-02 ASSESSMENT — ACTIVITIES OF DAILY LIVING (ADL): OASIS_M1830: 05

## 2025-07-02 NOTE — TELEPHONE ENCOUNTER
Patient's son Franc returned this QOL RN Navigator's message to discuss advanced care planning. This RN informed Franc that [facility] had requested this RN to contact them to provide more information and assist if needed. This RN provided education on palliative care, hospice, and advanced care documents including POLST and advanced directive. Franc reports that they would like to have palliative care as soon as possible and that patient is going to attempt to seek further chemo treatment. This RN explained transition from palliative care to hospice should patient want to stop seeking curative treatment such as chemotherapy. Franc reported understanding and will discuss POLST further with his sister tomorrow 7/3/25. Franc also inquired about DMV diability placard application. This RN e-mailed application and DMV link to Franc at provided e-mail: [email or web address]  This RN informed Franc that [facility]  will further assist with application.   All questions and concerns answered. Franc provided this RN navigator's contact information.

## 2025-07-03 ENCOUNTER — HOME CARE VISIT (OUTPATIENT)
Dept: HOME HEALTH SERVICES | Facility: HOME HEALTHCARE | Age: 80
End: 2025-07-03
Payer: MEDICARE

## 2025-07-03 ENCOUNTER — APPOINTMENT (OUTPATIENT)
Dept: RADIOLOGY | Facility: MEDICAL CENTER | Age: 80
End: 2025-07-03
Attending: EMERGENCY MEDICINE
Payer: MEDICARE

## 2025-07-03 ENCOUNTER — HOSPITAL ENCOUNTER (EMERGENCY)
Facility: MEDICAL CENTER | Age: 80
End: 2025-07-03
Attending: EMERGENCY MEDICINE
Payer: MEDICARE

## 2025-07-03 VITALS
WEIGHT: 170 LBS | TEMPERATURE: 97.1 F | OXYGEN SATURATION: 97 % | SYSTOLIC BLOOD PRESSURE: 128 MMHG | HEART RATE: 84 BPM | BODY MASS INDEX: 27.32 KG/M2 | DIASTOLIC BLOOD PRESSURE: 65 MMHG | HEIGHT: 66 IN | RESPIRATION RATE: 18 BRPM
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
## 2025-07-03 DIAGNOSIS — S09.90XA CLOSED HEAD INJURY, INITIAL ENCOUNTER: ICD-10-CM

## 2025-07-03 DIAGNOSIS — J90 PLEURAL EFFUSION: ICD-10-CM

## 2025-07-03 LAB
ALBUMIN SERPL BCP-MCNC: 2.9 G/DL (ref 3.2–4.9)
ALBUMIN/GLOB SERPL: 1 G/DL
ALP SERPL-CCNC: 125 U/L (ref 30–99)
ALT SERPL-CCNC: 76 U/L (ref 2–50)
ANION GAP SERPL CALC-SCNC: 11 MMOL/L (ref 7–16)
APPEARANCE UR: CLEAR
AST SERPL-CCNC: 47 U/L (ref 12–45)
BACTERIA #/AREA URNS HPF: ABNORMAL /HPF
BASOPHILS # BLD AUTO: 0 % (ref 0–1.8)
BASOPHILS # BLD: 0 K/UL (ref 0–0.12)
BILIRUB SERPL-MCNC: 0.5 MG/DL (ref 0.1–1.5)
BILIRUB UR QL STRIP.AUTO: NEGATIVE
BUN SERPL-MCNC: 13 MG/DL (ref 8–22)
CALCIUM ALBUM COR SERPL-MCNC: 9.4 MG/DL (ref 8.5–10.5)
CALCIUM SERPL-MCNC: 8.5 MG/DL (ref 8.5–10.5)
CASTS URNS QL MICRO: ABNORMAL /LPF (ref 0–2)
CHLORIDE SERPL-SCNC: 100 MMOL/L (ref 96–112)
CO2 SERPL-SCNC: 21 MMOL/L (ref 20–33)
COLOR UR: YELLOW
CREAT SERPL-MCNC: 0.44 MG/DL (ref 0.5–1.4)
EKG IMPRESSION: NORMAL
EOSINOPHIL # BLD AUTO: 0.1 K/UL (ref 0–0.51)
EOSINOPHIL NFR BLD: 2 % (ref 0–6.9)
EPITHELIAL CELLS 1715: ABNORMAL /HPF (ref 0–5)
ERYTHROCYTE [DISTWIDTH] IN BLOOD BY AUTOMATED COUNT: 43 FL (ref 35.9–50)
GFR SERPLBLD CREATININE-BSD FMLA CKD-EPI: 98 ML/MIN/1.73 M 2
GLOBULIN SER CALC-MCNC: 3 G/DL (ref 1.9–3.5)
GLUCOSE SERPL-MCNC: 98 MG/DL (ref 65–99)
GLUCOSE UR STRIP.AUTO-MCNC: NEGATIVE MG/DL
HCT VFR BLD AUTO: 37.1 % (ref 37–47)
HGB BLD-MCNC: 11.3 G/DL (ref 12–16)
IMM GRANULOCYTES # BLD AUTO: 0.04 K/UL (ref 0–0.11)
IMM GRANULOCYTES NFR BLD AUTO: 0.8 % (ref 0–0.9)
KETONES UR STRIP.AUTO-MCNC: NEGATIVE MG/DL
LEUKOCYTE ESTERASE UR QL STRIP.AUTO: NEGATIVE
LYMPHOCYTES # BLD AUTO: 0.3 K/UL (ref 1–4.8)
LYMPHOCYTES NFR BLD: 6 % (ref 22–41)
MCH RBC QN AUTO: 24.5 PG (ref 27–33)
MCHC RBC AUTO-ENTMCNC: 30.5 G/DL (ref 32.2–35.5)
MCV RBC AUTO: 80.3 FL (ref 81.4–97.8)
MICRO URNS: ABNORMAL
MONOCYTES # BLD AUTO: 0.02 K/UL (ref 0–0.85)
MONOCYTES NFR BLD AUTO: 0.4 % (ref 0–13.4)
NEUTROPHILS # BLD AUTO: 4.55 K/UL (ref 1.82–7.42)
NEUTROPHILS NFR BLD: 90.8 % (ref 44–72)
NITRITE UR QL STRIP.AUTO: NEGATIVE
NRBC # BLD AUTO: 0 K/UL
NRBC BLD-RTO: 0 /100 WBC (ref 0–0.2)
NT-PROBNP SERPL IA-MCNC: 181 PG/ML (ref 0–125)
PH UR STRIP.AUTO: 7.5 [PH] (ref 5–8)
PLATELET # BLD AUTO: 418 K/UL (ref 164–446)
PMV BLD AUTO: 9.7 FL (ref 9–12.9)
POTASSIUM SERPL-SCNC: 4.5 MMOL/L (ref 3.6–5.5)
PROT SERPL-MCNC: 5.9 G/DL (ref 6–8.2)
PROT UR QL STRIP: 30 MG/DL
RBC # BLD AUTO: 4.62 M/UL (ref 4.2–5.4)
RBC # URNS HPF: ABNORMAL /HPF (ref 0–2)
RBC UR QL AUTO: NEGATIVE
SODIUM SERPL-SCNC: 132 MMOL/L (ref 135–145)
SP GR UR STRIP.AUTO: 1.02
UROBILINOGEN UR STRIP.AUTO-MCNC: 1 EU/DL
WBC # BLD AUTO: 5 K/UL (ref 4.8–10.8)
WBC #/AREA URNS HPF: ABNORMAL /HPF

## 2025-07-03 PROCEDURE — 81001 URINALYSIS AUTO W/SCOPE: CPT

## 2025-07-03 PROCEDURE — 99284 EMERGENCY DEPT VISIT MOD MDM: CPT

## 2025-07-03 PROCEDURE — 71045 X-RAY EXAM CHEST 1 VIEW: CPT

## 2025-07-03 PROCEDURE — 665998 HH PPS REVENUE CREDIT

## 2025-07-03 PROCEDURE — 72125 CT NECK SPINE W/O DYE: CPT

## 2025-07-03 PROCEDURE — 665999 HH PPS REVENUE DEBIT

## 2025-07-03 PROCEDURE — 80053 COMPREHEN METABOLIC PANEL: CPT

## 2025-07-03 PROCEDURE — 93005 ELECTROCARDIOGRAM TRACING: CPT | Mod: TC | Performed by: EMERGENCY MEDICINE

## 2025-07-03 PROCEDURE — 36415 COLL VENOUS BLD VENIPUNCTURE: CPT

## 2025-07-03 PROCEDURE — 85025 COMPLETE CBC W/AUTO DIFF WBC: CPT

## 2025-07-03 PROCEDURE — 83880 ASSAY OF NATRIURETIC PEPTIDE: CPT

## 2025-07-03 PROCEDURE — 70450 CT HEAD/BRAIN W/O DYE: CPT

## 2025-07-03 ASSESSMENT — FIBROSIS 4 INDEX: FIB4 SCORE: 1.44

## 2025-07-03 ASSESSMENT — PAIN DESCRIPTION - PAIN TYPE: TYPE: ACUTE PAIN

## 2025-07-03 NOTE — ED NOTES
Discussed discharge paperwork with patient. No further questions at this time. Patient agreeable to discharge plan. Removed PIV. Patient wheeled to lobby with all personal belongings.

## 2025-07-03 NOTE — ED PROVIDER NOTES
Emergency Physician Note    Chief Concern:  Chief Complaint   Patient presents with    T-5000 GLF     Pt was getting out of bed, tripped over oxygen tubing, hit back of head on nightstand. -loc, +thinners, asa.          External Records Reviewed:  Inpatient records reviewed: Hospital medicine physician discharge summary reviewed from 6/30/2025.  Patient has a past medical history significant for coronary artery disease, hyperlipidemia, admitted for shortness of breath.  CT of the chest was negative for pulmonary embolism, bilateral pleural effusions noted.  CT abdomen pelvis demonstrated peritoneal carcinomatosis with large volume ascites.  While hospitalized, she had 1.7 L of fluid removed from the right pleural cavity, 1 L from the left pleural cavity, and 2 L of ascitic fluid.  She was discharged on home oxygen with continued outpatient oncology follow-up.    HPI/ROS     Outside Historians:   Family member at bedside provides additional history.     HPI:  Sheree Garcia is a 80 y.o. female who presents to the emergency department today as a code TBI activation.  She has past medical history significant for breast cancer, is currently on aspirin.  She lost her balance and tripped over her oxygen cord earlier today, she fell backward striking her head on a table.  She did not lose consciousness, does not report any severe headache.  Family member called home health, who expressed concern regarding the aspirin, and told her to present to the emergency department.  On arrival she feels slightly lightheaded, but is not having any headache, no nausea, no vomiting.  She reports no other injuries, does not have any pain to the upper or lower extremities after the fall.    PAST MEDICAL HISTORY  Past Medical History[1]    SURGICAL HISTORY  Past Surgical History[2]    FAMILY HISTORY  Family History   Problem Relation Age of Onset    Cancer Maternal Aunt         breast       SOCIAL HISTORY   reports that she has  never smoked. She has never used smokeless tobacco. She reports that she does not currently use alcohol. She reports that she does not use drugs.    CURRENT MEDICATIONS  Discharge Medication List as of 7/3/2025  8:44 AM        CONTINUE these medications which have NOT CHANGED    Details   Magnesium Chloride (MAGNESIUM DR PO) Take 1 Capsule by mouth every day. Indications: supplement, Historical Med      !! non-formulary med Take 1 Tablet by mouth 2 times a day as needed (cough). Mucus DM  Indications: cough, Historical Med      !! non-formulary med Take 1 Capsule by mouth every four hours as needed (chest congestion). mucus relief 400mg  Indications: congestion, Historical Med      Home Care Oxygen Inhale 3 L/min continuous. Oxygen dose range: 3 L/min  Respiratory route via: Nasal Cannula   Oxygen supplier: [employer]      Indications: SOB, Historical Med, Long-term      acetaminophen (TYLENOL) 650 MG CR tablet Take 500 mg by mouth every 6 hours as needed for Fever, Mild Pain or Moderate Pain. Indications: Fever, Pain, Historical Med      ondansetron (ZOFRAN ODT) 4 MG TABLET DISPERSIBLE Take 1 Tablet by mouth every 6 hours., Disp-10 Tablet, R-0, Normal      oxyCODONE immediate-release (ROXICODONE) 5 MG Tab Take 1 Tablet by mouth every 6 hours as needed for Severe Pain for up to 5 days., Disp-20 Tablet, R-0, Normal      senna-docusate (PERICOLACE OR SENOKOT S) 8.6-50 MG Tab Take 2 Tablets by mouth every evening., Disp-30 Tablet, R-0, Normal      polyethylene glycol/lytes (MIRALAX) 17 g Pack Mix 1 packet and drink by mouth 1 time a day as needed (if no bowel movement in last 2 days) for up to 15 days., Disp-15 Each, R-0, Normal      dexamethasone (DECADRON) 2 MG tablet Take 1 Tablet by mouth every 6 hours for 4 days. Disp-16 Tablet, R-0, Normal      ezetimibe (ZETIA) 10 MG Tab Take 10 mg by mouth every day. Indications: High Amount of Fats in the Blood, Historical Med      montelukast (SINGULAIR) 10 MG Tab Take 10 mg by  "mouth every day. Indications: Asthma, Historical Med      rosuvastatin (CRESTOR) 20 MG Tab Take 20 mg by mouth every evening. Indications: High Amount of Fats in the Blood, Historical Med      amLODIPine (NORVASC) 5 MG Tab Take 5 mg by mouth every day. Indications: High Blood Pressure, Historical Med      aspirin 81 MG EC tablet Take 81 mg by mouth every day. Indications: blood thinner, Historical Med      albuterol 108 (90 Base) MCG/ACT Aero Soln inhalation aerosol albuterol sulfate HFA 90 mcg/actuation aerosol inhaler   Inhale 2 puffs every 6-8 hours by inhalation route., Historical Med       !! - Potential duplicate medications found. Please discuss with provider.          ALLERGIES  Patient has no known allergies.    PHYSICAL EXAM  Vital Signs: /65   Pulse 84   Temp 36.2 °C (97.1 °F)   Resp 18   Ht 1.676 m (5' 6\")   Wt 77.1 kg (170 lb)   SpO2 97%   BMI 27.44 kg/m²   Constitutional: Alert, no acute distress  HENT: Small occipital scalp hematoma without overlying laceration or abrasion, no bony midline tenderness to palpation of the cervical spine  Cardiovascular: Tachycardic rate, regular rhythm  Pulmonary: No respiratory distress, normal work of breathing, breath sounds quiet and equal bilaterally  Abdomen: Soft, non tender, no peritoneal signs.  Skin: Warm, dry, no rashes or lesions  Musculoskeletal: Normal range of motion in all extremities, no swelling or deformity noted  Neurologic: Alert, oriented, normal motor function, no speech deficits, no facial asymmetry, no abnormal movements, no gaze deficit, 5 out of 5 bilateral upper and lower extremity strength    Diagnostic Studies & Procedures    Labs:  All labs reviewed by me as noted below.    EK Lead EKG interpreted by me as noted below  Results for orders placed or performed during the hospital encounter of 25   EKG   Result Value Ref Range    Report       [facility]    Test Date:  2025  Pt " Name:    JUSTINO SUN           Department:   MRN:        [id number]                      Room:        10  Gender:     Female                       Technician: [id number]  :        1945                   Requested By:CR LARSEN  Order #:    [id number]                    Reading MD: RC LARSEN MD    Measurements  Intervals                                Axis  Rate:       92                           P:          39  GA:         132                          QRS:        34  QRSD:       78                           T:          61  QT:         341  QTc:        422    Interpretive Statements  Rate 92, sinus rhythm, no ST elevation or depression, no pathologic T wave  inversions, no ectopy.  Electronically Signed On 2025 08:29:33 PDT by CR LARSEN MD         Radiology:  The attending Emergency Physician has independently interpreted the following imaging:  I independently reviewed the images of the chest x-ray, bilateral basilar opacities present.    DX-CHEST-PORTABLE (1 VIEW)   Final Result         1.  Bilateral pulmonary infiltrates and/or edema, new since prior study.   2.  Layering bilateral pleural effusions, new since prior study.   3.  Atherosclerosis      CT-CSPINE WITHOUT PLUS RECONS   Final Result         1.  No acute traumatic bony injury of the cervical spine is apparent.   2.  Moderate layering bilateral pleural effusions.   3.  Atherosclerosis.      CT-HEAD W/O   Final Result         1.  No acute intracranial abnormality.   2.  10 mm calcified mass abutting the midline posterior skull, appearance most compatible with meningioma.   3.  Atherosclerosis.                   Course and Medical Decision Making    Initial Assessment and Plan:  Ms. Sun presents to the emergency department today after a fall, she struck her head, and is currently on aspirin.  She was counseled to present to the emergency department by home health, due to her aspirin use.  She has no headache, no  nausea or vomiting, she did not lose consciousness.  She has no focal neurologic deficits on physical examination.    On laboratory evaluation she has normal white blood count, hemoglobin is 11.3 unchanged from baseline, no evidence of symptomatic anemia.  Platelet count is within normal limits.  She does have a mild transaminitis, also unchanged from prior.  Sodium is 132, has had mild hyponatremia on most recent lab work.  proBNP is just above normal reference range at 181.    CT cervical spine demonstrates no acute traumatic bony injury.  Moderate layering bilateral pleural effusions noted as an incidental finding.    CT head is negative for acute intracranial abnormality.  Incidental finding of 10 mm calcified mass, appearance most compatible with meningioma.    From a head injury standpoint, there is no acute process identified that would require further diagnostics nor treatment.  Incidental finding noted of worsening pleural effusions.  This was discussed with the patient and her family, I did offer admission for evaluation and treatment of recurrent pleural effusions if this would make her more comfortable.  She states she would prefer to stay home and out of the hospital which is entirely understandable.  She currently has excellent follow-up with her cancer care team, who can monitor her symptoms and arrange for outpatient thoracentesis if indicated.    Plan at this time is for discharge home. Return precautions were discussed with the patient, and provided in written form with the patient's discharge instructions.     Additional Problems and Disposition    Additional problems addressed:   1.  Recurrent pleural effusions     Escalation of care considered, and ultimately not performed:  1.  Hospitalization for worsening pleural effusions considered and discussed with the patient, she does have close outpatient follow-up, increasing pleural effusions are not significantly impairing her work of breathing at  this time, she would prefer to return home and follow-up on an outpatient basis.    Disposition:  Discharged in stable condition    FINAL IMPRESSION   1. Fall, initial encounter    2. Closed head injury, initial encounter    3. Pleural effusion        FOLLOW UP:  [facility] HEMATOLOGY/ONCOLOGY  [address]  [facility] [address]  [phone]    As needed    Kita Miner M.D.  [address] [facility]  [address]  [facility] [address]  [phone]      As needed    [facility], Emergency Dept  [address] [facility] [id number] [phone]  Go to   If symptoms worsen         [1]   Past Medical History:  Diagnosis Date    Arthritis     CAD (coronary artery disease)     Cataract     IOL    Heart burn     Indigestion    [2]   Past Surgical History:  Procedure Laterality Date    THYROID LOBECTOMY Left 12/26/2018    Procedure: THYROID LOBECTOMY - AND ISTHMUSECTOMY;  Surgeon: Yobani Otero M.D.;  Location: SURGERY SAME DAY [facility];  Service: General    HYSTERECTOMY LAPAROSCOPY      OTHER      mass in breast    OTHER ORTHOPEDIC SURGERY      broken ankle

## 2025-07-03 NOTE — ED NOTES
Labs collected and sent. PIV in RAC. Patient remains comfortable on gurney, no identifiable needs at this time. Equal chest rise and fall bilaterally, pt connected to cardiac monitor. Safety measures in place, call light within reach.

## 2025-07-03 NOTE — ED TRIAGE NOTES
Sheree Garcia  80 y.o. female    Chief Complaint   Patient presents with    T-5000 GLF     Pt was getting out of bed, tripped over oxygen tubing, hit back of head on nightstand. -loc, +thinners, asa.      Pt bib family member for above complaint. Pt alert and oriented. Pt states slight lightheadedness, denies any head or neck pain. Pt wears 3L at baseline, pt on chemo for ovarian cyst. TBI paged. ERP assessed pt at charge. Pt brought to red 10 after CT.     Vitals:    07/03/25 0658   BP: 122/81   Pulse: (!) 107   Resp: 17   Temp: 36.8 °C (98.2 °F)   SpO2: 96%

## 2025-07-03 NOTE — DISCHARGE INSTRUCTIONS
1.  As we discussed, please keep all scheduled follow-up appointments with your oncology team.    2.  Return to the emergency department if you develop any new or worsening symptoms including worsening shortness of breath, chest pain, lightheadedness, fevers, or if you have any further concerns.

## 2025-07-04 PROCEDURE — 665998 HH PPS REVENUE CREDIT

## 2025-07-04 PROCEDURE — 665999 HH PPS REVENUE DEBIT

## 2025-07-05 PROCEDURE — 665998 HH PPS REVENUE CREDIT

## 2025-07-05 PROCEDURE — 665999 HH PPS REVENUE DEBIT

## 2025-07-06 PROCEDURE — 665999 HH PPS REVENUE DEBIT

## 2025-07-06 PROCEDURE — 665998 HH PPS REVENUE CREDIT

## 2025-07-07 ENCOUNTER — DOCUMENTATION (OUTPATIENT)
Dept: VASCULAR LAB | Facility: MEDICAL CENTER | Age: 80
End: 2025-07-07
Payer: MEDICARE

## 2025-07-07 ENCOUNTER — HOSPITAL ENCOUNTER (OUTPATIENT)
Dept: RADIOLOGY | Facility: MEDICAL CENTER | Age: 80
End: 2025-07-07
Attending: FAMILY MEDICINE
Payer: MEDICARE

## 2025-07-07 ENCOUNTER — HOME CARE VISIT (OUTPATIENT)
Dept: HOME HEALTH SERVICES | Facility: HOME HEALTHCARE | Age: 80
End: 2025-07-07
Payer: MEDICARE

## 2025-07-07 DIAGNOSIS — J90 PLEURAL EFFUSION: ICD-10-CM

## 2025-07-07 DIAGNOSIS — R63.4 LOSS OF WEIGHT: ICD-10-CM

## 2025-07-07 PROCEDURE — 665999 HH PPS REVENUE DEBIT

## 2025-07-07 PROCEDURE — 71260 CT THORAX DX C+: CPT

## 2025-07-07 PROCEDURE — 665998 HH PPS REVENUE CREDIT

## 2025-07-07 PROCEDURE — 700117 HCHG RX CONTRAST REV CODE 255: Performed by: FAMILY MEDICINE

## 2025-07-07 RX ADMIN — IOHEXOL 75 ML: 350 INJECTION, SOLUTION INTRAVENOUS at 17:04

## 2025-07-07 NOTE — PROGRESS NOTES
[facility]    Received referral from [facility] to review patient's medication list.    Med list reviewed and reconciled.  Allergies reviewed.    No clinically significant DDI identified.      Caroline Monk, ReenaD

## 2025-07-08 ENCOUNTER — HOME CARE VISIT (OUTPATIENT)
Dept: HOME HEALTH SERVICES | Facility: HOME HEALTHCARE | Age: 80
End: 2025-07-08
Payer: MEDICARE

## 2025-07-08 VITALS
DIASTOLIC BLOOD PRESSURE: 54 MMHG | RESPIRATION RATE: 16 BRPM | HEART RATE: 76 BPM | OXYGEN SATURATION: 97 % | SYSTOLIC BLOOD PRESSURE: 112 MMHG | TEMPERATURE: 98.1 F

## 2025-07-08 PROCEDURE — 665998 HH PPS REVENUE CREDIT

## 2025-07-08 PROCEDURE — 665999 HH PPS REVENUE DEBIT

## 2025-07-08 PROCEDURE — G0299 HHS/HOSPICE OF RN EA 15 MIN: HCPCS

## 2025-07-08 PROCEDURE — G0151 HHCP-SERV OF PT,EA 15 MIN: HCPCS

## 2025-07-08 PROCEDURE — G0156 HHCP-SVS OF AIDE,EA 15 MIN: HCPCS

## 2025-07-08 ASSESSMENT — ENCOUNTER SYMPTOMS
DEBILITATING PAIN: 1
PAIN LOCATION - PAIN QUALITY: ACHE, DULL
PAIN SEVERITY GOAL: 2/10
PAIN LOCATION - RELIEVING FACTORS: REST, MEDICATION
PAIN: PAIN LIMITS FUNCTION AND SLEEP DAILY NOT CONSTANT
PAIN LOCATION - PAIN DURATION: DAILY
SUBJECTIVE PAIN PROGRESSION: WAXING AND WANING
PAIN LOCATION: BACK
PAIN LOCATION - PAIN SEVERITY: 4/10
LOWEST PAIN SEVERITY IN PAST 24 HOURS: 0/10
HIGHEST PAIN SEVERITY IN PAST 24 HOURS: 6/10
PAIN: 1
PAIN LOCATION - EXACERBATING FACTORS: MOVEMENT
PAIN LOCATION - PAIN FREQUENCY: INTERMITTENT

## 2025-07-08 ASSESSMENT — ACTIVITIES OF DAILY LIVING (ADL)
AMBULATION ASSISTANCE ON FLAT SURFACES: 1
AMBULATION_DISTANCE/DURATION_TOLERATED: 20 FEET

## 2025-07-08 NOTE — Clinical Note
NOTED.  ----- Message -----  From: Dulce Mccabe, PT  Sent: 7/8/2025   7:39 PM PDT  To: Marybeth Ricks R.N.; Nicole Park*      Physical therapy evaluation performed 7/8/2025 and I will follow 1 time a week for 1 week and 2 times a week for 3 weeks.  Further insurance authorization may be required.  Thank you

## 2025-07-08 NOTE — Clinical Note
Physical therapy evaluation performed 7/8/2025 and I will follow 1 time a week for 1 week and 2 times a week for 3 weeks.  Further insurance authorization may be required.  Thank you

## 2025-07-09 ENCOUNTER — HOME CARE VISIT (OUTPATIENT)
Dept: HOME HEALTH SERVICES | Facility: HOME HEALTHCARE | Age: 80
End: 2025-07-09
Payer: MEDICARE

## 2025-07-09 VITALS
OXYGEN SATURATION: 98 % | RESPIRATION RATE: 16 BRPM | SYSTOLIC BLOOD PRESSURE: 110 MMHG | TEMPERATURE: 98.2 F | DIASTOLIC BLOOD PRESSURE: 60 MMHG | HEART RATE: 75 BPM

## 2025-07-09 VITALS
TEMPERATURE: 98.1 F | HEART RATE: 76 BPM | OXYGEN SATURATION: 97 % | RESPIRATION RATE: 16 BRPM | SYSTOLIC BLOOD PRESSURE: 112 MMHG | DIASTOLIC BLOOD PRESSURE: 54 MMHG

## 2025-07-09 PROCEDURE — G0153 HHCP-SVS OF S/L PATH,EA 15MN: HCPCS

## 2025-07-09 PROCEDURE — 665998 HH PPS REVENUE CREDIT

## 2025-07-09 PROCEDURE — G0155 HHCP-SVS OF CSW,EA 15 MIN: HCPCS

## 2025-07-09 PROCEDURE — 665999 HH PPS REVENUE DEBIT

## 2025-07-09 SDOH — ECONOMIC STABILITY: HOUSING INSECURITY: EVIDENCE OF SMOKING MATERIAL: 0

## 2025-07-09 ASSESSMENT — ACTIVITIES OF DAILY LIVING (ADL)
BATHING_REQUIRES_ASSISTANCE: 1
GROOMING_REQUIRES_ASSISTANCE: 1
TOILETING_REQUIRES_ASSISTANCE: 1
AMBULATION_REQUIRES_ASSISTANCE: 1
SHOPPING_REQUIRES_ASSISTANCE: 1
EATING_REQUIRES_ASSISTANCE: 1
PHYSICAL_TRANSFER_REQUIRES_ASSISTANCE: 1
LAUNDRY_REQUIRES_ASSISTANCE: 1
DRESSING_REQUIRES_ASSISTANCE: 1

## 2025-07-09 ASSESSMENT — ENCOUNTER SYMPTOMS
DEPRESSED MOOD: 1
CHANGE IN APPETITE: INCREASED
PERSON REPORTING PAIN: PATIENT
PAIN LOCATION: LOWER ABDOMEN
SUBJECTIVE PAIN PROGRESSION: UNCHANGED
STOOL FREQUENCY: LESS THAN DAILY
PAIN: 1
MUSCLE WEAKNESS: 1
DENIES PAIN: 1
PAIN SEVERITY GOAL: 0/10
LOWEST PAIN SEVERITY IN PAST 24 HOURS: 0/10
VOMITING: DENIE
SORE THROAT: 1
PAIN LOCATION - PAIN SEVERITY: 3/10
PAIN LOCATION - PAIN FREQUENCY: INTERMITTENT
DENIES PAIN: 1
HIGHEST PAIN SEVERITY IN PAST 24 HOURS: 4/10
APPETITE LEVEL: FAIR
LAST BOWEL MOVEMENT: 67393
NAUSEA: DENIES
BOWEL PATTERN NORMAL: 1
PAIN LOCATION - PAIN QUALITY: ACHY

## 2025-07-09 ASSESSMENT — PATIENT HEALTH QUESTIONNAIRE - PHQ9: CLINICAL INTERPRETATION OF PHQ2 SCORE: 0

## 2025-07-09 NOTE — HOME HEALTH
PHYSICAL THERAPY EVALUATION AND PLAN OF CARE     ·       Patient: Sheree Garcia     ·       Home Health Admission due to: Recent hospitalization 6/25 through 6/30/2025 due to acute respiratory failure, peritoneal carcinomatosis with malignant pleural effusion and ascites who presents with decreased functional mobility, a recent fall (went to ER but was not admitted), decreased lower extremity functional strength, decreased activity tolerance, decreased balance, pain limiting sleep and function decreased knowledge of condition management and increased risk for falls      ·       Living Situation/PLOF: Patient lives alone in his two-story home with a half bath on the first floor but no shower and 1 stoop and 1 threshold without any railings to exit/enter at front and garage door.  Currently her son and daughter are taking turns staying with patient so that someone is always present.  Her son is her primary caregiver.  Patient has raised toilet seat with handles, FWW and electric bed with head/feet that elevate.  Patient reports prior to being diagnosed with cancer she was independent in all her functional mobility and ADLs without an assistive device.  She was independent in most IADLs.  She was driving and accessing the community on a regular basis.     ·       Past Medical History: HTN, CAD, hyperlipidemia, breast cancer, OA      ·       Skilled Therapeutic Need: Decreased activity tolerance, LE weakness, Balance control, Generalized deconditioning, Gait training, Transfer training, Caregiver training, Fall prevention and Knowledge of condition     Recommend skilled HHPT to address deficits and improve function-report sent to MD     ·       Frequency:   1 time a week for 1 week and then 2 times a week for 3 weeks,  Effective 7/8/2025    Does the patient get SOB with  exertion?  None noted today during evaluation    How often (if at all) does pain interfere with patient's movements?  On a daily not constant  basis limits sleep and function

## 2025-07-09 NOTE — PROGRESS NOTES
HV to residence. Permitted entry by daughter who attended visit, son joined towards end of visit. Patient denied falls, changes in medication or pain. Reviewed reason for visit, end of life planning and long term care planning. Patient has appropriate income and savings to afford cost of living and care to meet her increased needs as her disease progresses. LMSW educated on levels of care as well as cost. LMSW provided homemaker list, group home and facility list. Also discussed end of life goals related to POLST. Patient would like to be a DNR but is unsure what level of care she wants, she will review options with family and bring to oncologist for review of specific situations. Family agreeable to LMSW eval only, aware they may call LMSW with any additional questions.     Falls: 0    DME: fww, O2 concentrator, portable O2    Community resources: n/a    Advance Directive: not on file    Oxygen:       Liters: 3      Storage: Tanks/concentrator stored correctly      Medication List: Oxygen on medication list    Discipline Frequency: 1 visit, patient agreeable    Follow Up: n/a

## 2025-07-10 ENCOUNTER — HOME CARE VISIT (OUTPATIENT)
Dept: HOME HEALTH SERVICES | Facility: HOME HEALTHCARE | Age: 80
End: 2025-07-10
Payer: MEDICARE

## 2025-07-10 VITALS
DIASTOLIC BLOOD PRESSURE: 60 MMHG | SYSTOLIC BLOOD PRESSURE: 102 MMHG | OXYGEN SATURATION: 97 % | TEMPERATURE: 97.4 F | RESPIRATION RATE: 18 BRPM | HEART RATE: 74 BPM

## 2025-07-10 PROCEDURE — 665999 HH PPS REVENUE DEBIT

## 2025-07-10 PROCEDURE — 665998 HH PPS REVENUE CREDIT

## 2025-07-10 PROCEDURE — G0152 HHCP-SERV OF OT,EA 15 MIN: HCPCS

## 2025-07-10 PROCEDURE — G0156 HHCP-SVS OF AIDE,EA 15 MIN: HCPCS

## 2025-07-10 SDOH — ECONOMIC STABILITY: HOUSING INSECURITY: EVIDENCE OF SMOKING MATERIAL: 0

## 2025-07-10 SDOH — ECONOMIC STABILITY: HOUSING INSECURITY
HOME SAFETY: EDUCATED PT AND CAREGIVERS ON USE OF TTB (TUB TRANSFER) FOR INCREASED SAFETY AND INDEPENDENCE WITH TUB SHOWER TRANSFERES, WHEN APPROPRIATE.

## 2025-07-10 ASSESSMENT — ACTIVITIES OF DAILY LIVING (ADL)
BATHING_CURRENT_FUNCTION: MODERATE ASSIST
GROOMING_CURRENT_FUNCTION: SUPERVISION
AMBULATION ASSISTANCE: 1
TOILETING: 1
HOUSEKEEPING ASSESSED: 1
SHOPPING: DEPENDENT
BATHING ASSESSED: 1
FEEDING: INDEPENDENT
PREPARING MEALS: DEPENDENT
TRANSPORTATION: DEPENDENT
FEEDING ASSESSED: 1
DRESSING_LB_CURRENT_FUNCTION: MODERATE ASSIST
LIGHT HOUSEKEEPING: DEPENDENT
GROOMING ASSESSED: 1
CURRENT_FUNCTION: MAXIMUM ASSIST
PHYSICAL_TRANSFERS_DEVICES: 2WW
GROOMING EQUIPMENT USED: 2WW
ORAL_CARE_ASSESSED: 1
TRANSPORTATION ASSESSED: 1
PHYSICAL TRANSFERS ASSESSED: 1
DRESSING_UB_CURRENT_FUNCTION: INDEPENDENT
ORAL_CARE_EQUIPMENT_USED: 2WW
AMBULATION ASSISTANCE: STAND BY ASSIST
SHOPPING ASSESSED: 1
ORAL_CARE_CURRENT_FUNCTION: NEEDS ASSISTANCE
TOILETING: STAND BY ASSIST
USING THE TELPHONE: INDEPENDENT
LAUNDRY ASSESSED: 1
LAUNDRY: DEPENDENT
TELEPHONE USE ASSESSED: 1

## 2025-07-10 ASSESSMENT — ENCOUNTER SYMPTOMS: DENIES PAIN: 1

## 2025-07-11 ENCOUNTER — HOME CARE VISIT (OUTPATIENT)
Dept: HOME HEALTH SERVICES | Facility: HOME HEALTHCARE | Age: 80
End: 2025-07-11
Payer: MEDICARE

## 2025-07-11 PROCEDURE — 665998 HH PPS REVENUE CREDIT

## 2025-07-11 PROCEDURE — 665999 HH PPS REVENUE DEBIT

## 2025-07-12 PROCEDURE — 665999 HH PPS REVENUE DEBIT

## 2025-07-12 PROCEDURE — 665998 HH PPS REVENUE CREDIT

## 2025-07-13 PROCEDURE — 665998 HH PPS REVENUE CREDIT

## 2025-07-13 PROCEDURE — 665999 HH PPS REVENUE DEBIT

## 2025-07-14 ENCOUNTER — HOME CARE VISIT (OUTPATIENT)
Dept: HOME HEALTH SERVICES | Facility: HOME HEALTHCARE | Age: 80
End: 2025-07-14
Payer: MEDICARE

## 2025-07-14 ENCOUNTER — HOSPITAL ENCOUNTER (OUTPATIENT)
Facility: MEDICAL CENTER | Age: 80
End: 2025-07-14
Payer: MEDICARE

## 2025-07-14 VITALS
OXYGEN SATURATION: 96 % | HEART RATE: 74 BPM | DIASTOLIC BLOOD PRESSURE: 62 MMHG | RESPIRATION RATE: 18 BRPM | SYSTOLIC BLOOD PRESSURE: 110 MMHG | TEMPERATURE: 97.6 F

## 2025-07-14 LAB
ALBUMIN SERPL BCP-MCNC: 3.2 G/DL (ref 3.2–4.9)
ALBUMIN/GLOB SERPL: 1.1 G/DL
ALP SERPL-CCNC: 171 U/L (ref 30–99)
ALT SERPL-CCNC: 47 U/L (ref 2–50)
ANION GAP SERPL CALC-SCNC: 12 MMOL/L (ref 7–16)
APPEARANCE UR: CLEAR
AST SERPL-CCNC: 32 U/L (ref 12–45)
BILIRUB SERPL-MCNC: <0.2 MG/DL (ref 0.1–1.5)
BILIRUB UR QL STRIP.AUTO: NEGATIVE
BUN SERPL-MCNC: 7 MG/DL (ref 8–22)
CALCIUM ALBUM COR SERPL-MCNC: 9.5 MG/DL (ref 8.5–10.5)
CALCIUM SERPL-MCNC: 8.9 MG/DL (ref 8.5–10.5)
CANCER AG125 SERPL-ACNC: 796 U/ML (ref 0–35)
CHLORIDE SERPL-SCNC: 103 MMOL/L (ref 96–112)
CO2 SERPL-SCNC: 23 MMOL/L (ref 20–33)
COLOR UR: YELLOW
CREAT SERPL-MCNC: 0.4 MG/DL (ref 0.5–1.4)
ERYTHROCYTE [DISTWIDTH] IN BLOOD BY AUTOMATED COUNT: 47.1 FL (ref 35.9–50)
GFR SERPLBLD CREATININE-BSD FMLA CKD-EPI: 100 ML/MIN/1.73 M 2
GLOBULIN SER CALC-MCNC: 2.8 G/DL (ref 1.9–3.5)
GLUCOSE SERPL-MCNC: 90 MG/DL (ref 65–99)
GLUCOSE UR STRIP.AUTO-MCNC: NEGATIVE MG/DL
HCT VFR BLD AUTO: 35.3 % (ref 37–47)
HGB BLD-MCNC: 10.5 G/DL (ref 12–16)
KETONES UR STRIP.AUTO-MCNC: NEGATIVE MG/DL
LEUKOCYTE ESTERASE UR QL STRIP.AUTO: NEGATIVE
MCH RBC QN AUTO: 24.6 PG (ref 27–33)
MCHC RBC AUTO-ENTMCNC: 29.7 G/DL (ref 32.2–35.5)
MCV RBC AUTO: 82.7 FL (ref 81.4–97.8)
MICRO URNS: NORMAL
NITRITE UR QL STRIP.AUTO: NEGATIVE
PH UR STRIP.AUTO: 8 [PH] (ref 5–8)
PLATELET # BLD AUTO: 464 K/UL (ref 164–446)
PMV BLD AUTO: 9.3 FL (ref 9–12.9)
POTASSIUM SERPL-SCNC: 4.5 MMOL/L (ref 3.6–5.5)
PROT SERPL-MCNC: 6 G/DL (ref 6–8.2)
PROT UR QL STRIP: NEGATIVE MG/DL
RBC # BLD AUTO: 4.27 M/UL (ref 4.2–5.4)
RBC UR QL AUTO: NEGATIVE
SODIUM SERPL-SCNC: 138 MMOL/L (ref 135–145)
SP GR UR STRIP.AUTO: 1.01
UROBILINOGEN UR STRIP.AUTO-MCNC: 0.2 EU/DL
WBC # BLD AUTO: 5 K/UL (ref 4.8–10.8)

## 2025-07-14 PROCEDURE — G0299 HHS/HOSPICE OF RN EA 15 MIN: HCPCS

## 2025-07-14 PROCEDURE — 80053 COMPREHEN METABOLIC PANEL: CPT

## 2025-07-14 PROCEDURE — G0152 HHCP-SERV OF OT,EA 15 MIN: HCPCS

## 2025-07-14 PROCEDURE — 81003 URINALYSIS AUTO W/O SCOPE: CPT

## 2025-07-14 PROCEDURE — 86304 IMMUNOASSAY TUMOR CA 125: CPT | Mod: GA

## 2025-07-14 PROCEDURE — 85027 COMPLETE CBC AUTOMATED: CPT

## 2025-07-14 PROCEDURE — 665998 HH PPS REVENUE CREDIT

## 2025-07-14 PROCEDURE — 665999 HH PPS REVENUE DEBIT

## 2025-07-14 SDOH — ECONOMIC STABILITY: HOUSING INSECURITY: EVIDENCE OF SMOKING MATERIAL: 0

## 2025-07-14 SDOH — ECONOMIC STABILITY: HOUSING INSECURITY
HOME SAFETY: RE-EDUCATED ON TTB FOR DECREASED RISK OF FALLS WITH SHOWERING / SHOWER TRANSFERS. PT'S SON REPORTS FAMILY IS PLANNING TO MOVE PT TO SINGLE LEVEL HOME, WHERE STAIR ACCESS TO BATHROOM WILL NOT IMPEDE PT'S ABILITY TO ENGAGE IN SHOWERING.

## 2025-07-14 ASSESSMENT — ENCOUNTER SYMPTOMS
PAIN LOCATION - PAIN DURATION: DAILY
PAIN LOCATION - EXACERBATING FACTORS: ACTIVITY
PAIN LOCATION - PAIN QUALITY: ACHE
DIFFICULTY THINKING: 1
HIGHEST PAIN SEVERITY IN PAST 24 HOURS: 4/10
PAIN LOCATION: RIGHT SHOULDER
SUBJECTIVE PAIN PROGRESSION: GRADUALLY IMPROVING
PAIN: 1
PAIN LOCATION - RELIEVING FACTORS: MEDICATIONS, REST
PAIN LOCATION - PAIN SEVERITY: 3/10
PAIN LOCATION - PAIN FREQUENCY: CONSTANT
PAIN SEVERITY GOAL: 0/10
LOWEST PAIN SEVERITY IN PAST 24 HOURS: 3/10

## 2025-07-15 ENCOUNTER — HOME CARE VISIT (OUTPATIENT)
Dept: HOME HEALTH SERVICES | Facility: HOME HEALTHCARE | Age: 80
End: 2025-07-15
Payer: MEDICARE

## 2025-07-15 VITALS
TEMPERATURE: 98.2 F | DIASTOLIC BLOOD PRESSURE: 70 MMHG | SYSTOLIC BLOOD PRESSURE: 110 MMHG | HEART RATE: 68 BPM | OXYGEN SATURATION: 94 % | RESPIRATION RATE: 18 BRPM

## 2025-07-15 VITALS
RESPIRATION RATE: 20 BRPM | HEART RATE: 72 BPM | DIASTOLIC BLOOD PRESSURE: 60 MMHG | SYSTOLIC BLOOD PRESSURE: 106 MMHG | TEMPERATURE: 98 F | OXYGEN SATURATION: 98 %

## 2025-07-15 PROCEDURE — 665998 HH PPS REVENUE CREDIT

## 2025-07-15 PROCEDURE — G0151 HHCP-SERV OF PT,EA 15 MIN: HCPCS

## 2025-07-15 PROCEDURE — 665999 HH PPS REVENUE DEBIT

## 2025-07-15 PROCEDURE — G0156 HHCP-SVS OF AIDE,EA 15 MIN: HCPCS

## 2025-07-15 SDOH — ECONOMIC STABILITY: HOUSING INSECURITY: EVIDENCE OF SMOKING MATERIAL: 0

## 2025-07-15 ASSESSMENT — ENCOUNTER SYMPTOMS
VOMITING: DENIES
PAIN SEVERITY GOAL: 0/10
STOOL FREQUENCY: DAILY
DENIES PAIN: 1
LOWEST PAIN SEVERITY IN PAST 24 HOURS: 0/10
PAIN: 1
PAIN LOCATION - PAIN QUALITY: SHARP, DULL
MUSCLE WEAKNESS: 1
HIGHEST PAIN SEVERITY IN PAST 24 HOURS: 2/10
PAIN LOCATION - PAIN DURATION: DAILY
PAIN LOCATION - PAIN FREQUENCY: WITH ACTIVITY
PAIN: PAIN DOES NOT LIMIT
PAIN LOCATION - PAIN SEVERITY: 0/10
BOWEL PATTERN NORMAL: 1
NAUSEA: DENIES
PAIN LOCATION - RELIEVING FACTORS: REST, MEDICATION
SUBJECTIVE PAIN PROGRESSION: WAXING AND WANING
LAST BOWEL MOVEMENT: 67400
PAIN LOCATION - EXACERBATING FACTORS: MOVEMENT
PAIN LOCATION: R SHOULDER

## 2025-07-15 ASSESSMENT — PATIENT HEALTH QUESTIONNAIRE - PHQ9: CLINICAL INTERPRETATION OF PHQ2 SCORE: 0

## 2025-07-16 ENCOUNTER — HOME CARE VISIT (OUTPATIENT)
Dept: HOME HEALTH SERVICES | Facility: HOME HEALTHCARE | Age: 80
End: 2025-07-16
Payer: MEDICARE

## 2025-07-16 VITALS
RESPIRATION RATE: 20 BRPM | SYSTOLIC BLOOD PRESSURE: 106 MMHG | OXYGEN SATURATION: 98 % | HEART RATE: 72 BPM | TEMPERATURE: 98 F | DIASTOLIC BLOOD PRESSURE: 60 MMHG

## 2025-07-16 PROCEDURE — G0152 HHCP-SERV OF OT,EA 15 MIN: HCPCS

## 2025-07-16 PROCEDURE — 665998 HH PPS REVENUE CREDIT

## 2025-07-16 PROCEDURE — 665999 HH PPS REVENUE DEBIT

## 2025-07-16 SDOH — ECONOMIC STABILITY: HOUSING INSECURITY: EVIDENCE OF SMOKING MATERIAL: 0

## 2025-07-16 SDOH — ECONOMIC STABILITY: HOUSING INSECURITY: HOME SAFETY: RE-EDUCATED ON BED RAILS FOR INCREASED SAFETY WITH BED MOBILITY.

## 2025-07-16 ASSESSMENT — ENCOUNTER SYMPTOMS
DENIES PAIN: 1
DENIES PAIN: 1

## 2025-07-17 ENCOUNTER — HOME CARE VISIT (OUTPATIENT)
Dept: HOME HEALTH SERVICES | Facility: HOME HEALTHCARE | Age: 80
End: 2025-07-17
Payer: MEDICARE

## 2025-07-17 VITALS
RESPIRATION RATE: 76 BRPM | HEART RATE: 76 BPM | OXYGEN SATURATION: 98 % | DIASTOLIC BLOOD PRESSURE: 56 MMHG | SYSTOLIC BLOOD PRESSURE: 96 MMHG | TEMPERATURE: 98.8 F

## 2025-07-17 PROCEDURE — G0151 HHCP-SERV OF PT,EA 15 MIN: HCPCS

## 2025-07-17 PROCEDURE — 665998 HH PPS REVENUE CREDIT

## 2025-07-17 PROCEDURE — 665999 HH PPS REVENUE DEBIT

## 2025-07-17 ASSESSMENT — ENCOUNTER SYMPTOMS
PAIN LOCATION - RELIEVING FACTORS: REST, MEDICATION
LOWEST PAIN SEVERITY IN PAST 24 HOURS: 0/10
PAIN LOCATION - EXACERBATING FACTORS: MOVEMENT
PAIN LOCATION - PAIN FREQUENCY: WITH ACTIVITY
PAIN LOCATION - PAIN SEVERITY: 0/10
SUBJECTIVE PAIN PROGRESSION: WAXING AND WANING
HIGHEST PAIN SEVERITY IN PAST 24 HOURS: 4/10
PAIN SEVERITY GOAL: 0/10
PAIN: PAIN LIMITS FUNCTION LESS THAN DAILY
PAIN LOCATION - PAIN DURATION: LESS THAN DAILY
PAIN LOCATION - PAIN QUALITY: ACHE
PAIN LOCATION: RIBS
PAIN: 1

## 2025-07-18 ENCOUNTER — HOSPITAL ENCOUNTER (OUTPATIENT)
Facility: MEDICAL CENTER | Age: 80
End: 2025-07-18
Attending: SPECIALIST
Payer: MEDICARE

## 2025-07-18 ENCOUNTER — HOME CARE VISIT (OUTPATIENT)
Dept: HOME HEALTH SERVICES | Facility: HOME HEALTHCARE | Age: 80
End: 2025-07-18
Payer: MEDICARE

## 2025-07-18 ENCOUNTER — APPOINTMENT (OUTPATIENT)
Dept: ADMISSIONS | Facility: MEDICAL CENTER | Age: 80
End: 2025-07-18
Attending: SPECIALIST
Payer: MEDICARE

## 2025-07-18 VITALS
SYSTOLIC BLOOD PRESSURE: 118 MMHG | RESPIRATION RATE: 16 BRPM | DIASTOLIC BLOOD PRESSURE: 60 MMHG | OXYGEN SATURATION: 98 % | TEMPERATURE: 98.3 F | HEART RATE: 67 BPM

## 2025-07-18 LAB
ABO GROUP BLD: NORMAL
ALBUMIN SERPL BCP-MCNC: 3.2 G/DL (ref 3.2–4.9)
ALBUMIN/GLOB SERPL: 1.2 G/DL
ALP SERPL-CCNC: 137 U/L (ref 30–99)
ALT SERPL-CCNC: 33 U/L (ref 2–50)
ANION GAP SERPL CALC-SCNC: 11 MMOL/L (ref 7–16)
APTT PPP: 30.6 SEC (ref 24.7–36)
AST SERPL-CCNC: 30 U/L (ref 12–45)
BASOPHILS # BLD AUTO: 1.1 % (ref 0–1.8)
BASOPHILS # BLD: 0.05 K/UL (ref 0–0.12)
BILIRUB SERPL-MCNC: <0.2 MG/DL (ref 0.1–1.5)
BLD GP AB SCN SERPL QL: NORMAL
BUN SERPL-MCNC: 14 MG/DL (ref 8–22)
CALCIUM ALBUM COR SERPL-MCNC: 9.6 MG/DL (ref 8.5–10.5)
CALCIUM SERPL-MCNC: 9 MG/DL (ref 8.5–10.5)
CHLORIDE SERPL-SCNC: 104 MMOL/L (ref 96–112)
CO2 SERPL-SCNC: 23 MMOL/L (ref 20–33)
CREAT SERPL-MCNC: 0.48 MG/DL (ref 0.5–1.4)
EOSINOPHIL # BLD AUTO: 0.1 K/UL (ref 0–0.51)
EOSINOPHIL NFR BLD: 2.2 % (ref 0–6.9)
ERYTHROCYTE [DISTWIDTH] IN BLOOD BY AUTOMATED COUNT: 48.9 FL (ref 35.9–50)
GFR SERPLBLD CREATININE-BSD FMLA CKD-EPI: 95 ML/MIN/1.73 M 2
GLOBULIN SER CALC-MCNC: 2.7 G/DL (ref 1.9–3.5)
GLUCOSE SERPL-MCNC: 95 MG/DL (ref 65–99)
HCT VFR BLD AUTO: 33.7 % (ref 37–47)
HGB BLD-MCNC: 10.2 G/DL (ref 12–16)
IMM GRANULOCYTES # BLD AUTO: 0.02 K/UL (ref 0–0.11)
IMM GRANULOCYTES NFR BLD AUTO: 0.4 % (ref 0–0.9)
INR PPP: 1.03 (ref 0.87–1.13)
LYMPHOCYTES # BLD AUTO: 1.14 K/UL (ref 1–4.8)
LYMPHOCYTES NFR BLD: 24.7 % (ref 22–41)
MCH RBC QN AUTO: 25.2 PG (ref 27–33)
MCHC RBC AUTO-ENTMCNC: 30.3 G/DL (ref 32.2–35.5)
MCV RBC AUTO: 83.4 FL (ref 81.4–97.8)
MONOCYTES # BLD AUTO: 0.43 K/UL (ref 0–0.85)
MONOCYTES NFR BLD AUTO: 9.3 % (ref 0–13.4)
NEUTROPHILS # BLD AUTO: 2.87 K/UL (ref 1.82–7.42)
NEUTROPHILS NFR BLD: 62.3 % (ref 44–72)
NRBC # BLD AUTO: 0 K/UL
NRBC BLD-RTO: 0 /100 WBC (ref 0–0.2)
PLATELET # BLD AUTO: 477 K/UL (ref 164–446)
PMV BLD AUTO: 9.2 FL (ref 9–12.9)
POTASSIUM SERPL-SCNC: 4.4 MMOL/L (ref 3.6–5.5)
PROT SERPL-MCNC: 5.9 G/DL (ref 6–8.2)
PROTHROMBIN TIME: 13.5 SEC (ref 12–14.6)
RBC # BLD AUTO: 4.04 M/UL (ref 4.2–5.4)
RH BLD: NORMAL
SODIUM SERPL-SCNC: 138 MMOL/L (ref 135–145)
WBC # BLD AUTO: 4.6 K/UL (ref 4.8–10.8)

## 2025-07-18 PROCEDURE — 85025 COMPLETE CBC W/AUTO DIFF WBC: CPT

## 2025-07-18 PROCEDURE — 86850 RBC ANTIBODY SCREEN: CPT

## 2025-07-18 PROCEDURE — 80053 COMPREHEN METABOLIC PANEL: CPT

## 2025-07-18 PROCEDURE — 85610 PROTHROMBIN TIME: CPT

## 2025-07-18 PROCEDURE — 86901 BLOOD TYPING SEROLOGIC RH(D): CPT

## 2025-07-18 PROCEDURE — 86900 BLOOD TYPING SEROLOGIC ABO: CPT

## 2025-07-18 PROCEDURE — 665999 HH PPS REVENUE DEBIT

## 2025-07-18 PROCEDURE — 665998 HH PPS REVENUE CREDIT

## 2025-07-18 PROCEDURE — 85730 THROMBOPLASTIN TIME PARTIAL: CPT

## 2025-07-18 PROCEDURE — G0299 HHS/HOSPICE OF RN EA 15 MIN: HCPCS

## 2025-07-18 ASSESSMENT — ENCOUNTER SYMPTOMS
PAIN LOCATION - RELIEVING FACTORS: REST
PAIN LOCATION - PAIN FREQUENCY: INTERMITTENT
NAUSEA: DENIES
PAIN LOCATION - PAIN QUALITY: ACHE
BOWEL PATTERN NORMAL: 1
PAIN LOCATION - PAIN DURATION: CHRONIC
HIGHEST PAIN SEVERITY IN PAST 24 HOURS: 3/10
PAIN LOCATION: RIGHT SHOULDER
LAST BOWEL MOVEMENT: 67404
PAIN LOCATION - PAIN SEVERITY: 3/10
PAIN SEVERITY GOAL: 0/10
PAIN: 1
VOMITING: DENIES
SUBJECTIVE PAIN PROGRESSION: UNCHANGED
PAIN LOCATION - EXACERBATING FACTORS: ACTIVITY
LOWEST PAIN SEVERITY IN PAST 24 HOURS: 0/10

## 2025-07-19 PROCEDURE — 665998 HH PPS REVENUE CREDIT

## 2025-07-19 PROCEDURE — 665999 HH PPS REVENUE DEBIT

## 2025-07-20 PROCEDURE — 665998 HH PPS REVENUE CREDIT

## 2025-07-20 PROCEDURE — 665999 HH PPS REVENUE DEBIT

## 2025-07-21 ENCOUNTER — APPOINTMENT (OUTPATIENT)
Dept: RADIOLOGY | Facility: MEDICAL CENTER | Age: 80
End: 2025-07-21
Attending: SPECIALIST
Payer: MEDICARE

## 2025-07-21 ENCOUNTER — HOSPITAL ENCOUNTER (OUTPATIENT)
Dept: RADIOLOGY | Facility: MEDICAL CENTER | Age: 80
End: 2025-07-21
Attending: SPECIALIST | Admitting: SPECIALIST
Payer: MEDICARE

## 2025-07-21 ENCOUNTER — PRE-ADMISSION TESTING (OUTPATIENT)
Dept: ADMISSIONS | Facility: MEDICAL CENTER | Age: 80
End: 2025-07-21
Attending: SPECIALIST
Payer: MEDICARE

## 2025-07-21 DIAGNOSIS — Z01.812 PRE-OPERATIVE LABORATORY EXAMINATION: ICD-10-CM

## 2025-07-21 DIAGNOSIS — Z01.810 PRE-OPERATIVE CARDIOVASCULAR EXAMINATION: Primary | ICD-10-CM

## 2025-07-21 DIAGNOSIS — Z01.811 PRE-OPERATIVE RESPIRATORY EXAMINATION: ICD-10-CM

## 2025-07-21 PROCEDURE — 665998 HH PPS REVENUE CREDIT

## 2025-07-21 PROCEDURE — 665999 HH PPS REVENUE DEBIT

## 2025-07-21 NOTE — OR NURSING
Pre-Admit RN appointment completed for 7/22/25. Copy of Medication Reconciliation with instructions provided to pt via [employer].

## 2025-07-21 NOTE — PREADMIT AVS NOTE
Current Medications   Medication Instructions    celecoxib (CELEBREX) 100 MG Cap Stop 5 days before surgery    calcium carbonate (TUMS) 500 MG Chew Tab Hold medication day of procedure    Magnesium Chloride (MAGNESIUM DR PO) Stop 7 days before surgery                   acetaminophen (TYLENOL) 650 MG CR tablet Continue taking as prescribed.    ondansetron (ZOFRAN ODT) 4 MG TABLET DISPERSIBLE Continue taking as prescribed.    senna-docusate (PERICOLACE OR SENOKOT S) 8.6-50 MG Tab Continue taking as prescribed.    ezetimibe (ZETIA) 10 MG Tab Stop 24 hours before surgery    montelukast (SINGULAIR) 10 MG Tab Continue taking as prescribed.    rosuvastatin (CRESTOR) 20 MG Tab Continue taking as prescribed.    amLODIPine (NORVASC) 5 MG Tab Continue taking as prescribed.    aspirin 81 MG EC tablet Follow instructions from surgeon or specialist.    albuterol 108 (90 Base) MCG/ACT Aero Soln inhalation aerosol Continue taking as prescribed.

## 2025-07-22 ENCOUNTER — ANESTHESIA (OUTPATIENT)
Dept: SURGERY | Facility: MEDICAL CENTER | Age: 80
End: 2025-07-22
Payer: MEDICARE

## 2025-07-22 ENCOUNTER — HOME CARE VISIT (OUTPATIENT)
Dept: HOME HEALTH SERVICES | Facility: HOME HEALTHCARE | Age: 80
End: 2025-07-22
Payer: MEDICARE

## 2025-07-22 ENCOUNTER — ANESTHESIA EVENT (OUTPATIENT)
Dept: SURGERY | Facility: MEDICAL CENTER | Age: 80
End: 2025-07-22
Payer: MEDICARE

## 2025-07-22 ENCOUNTER — HOSPITAL ENCOUNTER (OUTPATIENT)
Facility: MEDICAL CENTER | Age: 80
End: 2025-07-22
Attending: SPECIALIST | Admitting: SPECIALIST
Payer: MEDICARE

## 2025-07-22 VITALS
OXYGEN SATURATION: 98 % | DIASTOLIC BLOOD PRESSURE: 70 MMHG | SYSTOLIC BLOOD PRESSURE: 122 MMHG | RESPIRATION RATE: 16 BRPM | HEART RATE: 68 BPM | TEMPERATURE: 98.4 F

## 2025-07-22 VITALS
SYSTOLIC BLOOD PRESSURE: 232 MMHG | RESPIRATION RATE: 16 BRPM | WEIGHT: 147.71 LBS | HEART RATE: 73 BPM | BODY MASS INDEX: 25.22 KG/M2 | DIASTOLIC BLOOD PRESSURE: 57 MMHG | OXYGEN SATURATION: 99 % | HEIGHT: 64 IN | TEMPERATURE: 99.2 F

## 2025-07-22 LAB
ABO GROUP BLD: NORMAL
BLD GP AB SCN SERPL QL: NORMAL
RH BLD: NORMAL

## 2025-07-22 PROCEDURE — 700105 HCHG RX REV CODE 258: Performed by: SPECIALIST

## 2025-07-22 PROCEDURE — 88305 TISSUE EXAM BY PATHOLOGIST: CPT | Mod: 26 | Performed by: PATHOLOGY

## 2025-07-22 PROCEDURE — 160193 HCHG PACU STANDARD - 1ST 60 MINS: Performed by: SPECIALIST

## 2025-07-22 PROCEDURE — 700111 HCHG RX REV CODE 636 W/ 250 OVERRIDE (IP): Performed by: ANESTHESIOLOGY

## 2025-07-22 PROCEDURE — 86900 BLOOD TYPING SEROLOGIC ABO: CPT

## 2025-07-22 PROCEDURE — 88342 IMHCHEM/IMCYTCHM 1ST ANTB: CPT | Mod: 26,59 | Performed by: PATHOLOGY

## 2025-07-22 PROCEDURE — 88342 IMHCHEM/IMCYTCHM 1ST ANTB: CPT | Performed by: PATHOLOGY

## 2025-07-22 PROCEDURE — 88341 IMHCHEM/IMCYTCHM EA ADD ANTB: CPT | Mod: 91 | Performed by: PATHOLOGY

## 2025-07-22 PROCEDURE — 160015 HCHG STAT PREOP MINUTES: Performed by: SPECIALIST

## 2025-07-22 PROCEDURE — 700101 HCHG RX REV CODE 250: Performed by: SPECIALIST

## 2025-07-22 PROCEDURE — 88360 TUMOR IMMUNOHISTOCHEM/MANUAL: CPT | Mod: 26 | Performed by: PATHOLOGY

## 2025-07-22 PROCEDURE — 160192 HCHG ANESTHESIA COMPLEX: Performed by: SPECIALIST

## 2025-07-22 PROCEDURE — 160002 HCHG RECOVERY MINUTES (STAT): Performed by: SPECIALIST

## 2025-07-22 PROCEDURE — 700111 HCHG RX REV CODE 636 W/ 250 OVERRIDE (IP): Mod: JZ | Performed by: ANESTHESIOLOGY

## 2025-07-22 PROCEDURE — 700102 HCHG RX REV CODE 250 W/ 637 OVERRIDE(OP): Performed by: ANESTHESIOLOGY

## 2025-07-22 PROCEDURE — 665999 HH PPS REVENUE DEBIT

## 2025-07-22 PROCEDURE — 88341 IMHCHEM/IMCYTCHM EA ADD ANTB: CPT | Mod: 26,59 | Performed by: PATHOLOGY

## 2025-07-22 PROCEDURE — 665998 HH PPS REVENUE CREDIT

## 2025-07-22 PROCEDURE — 160046 HCHG PACU - 1ST 60 MINS PHASE II: Performed by: SPECIALIST

## 2025-07-22 PROCEDURE — 160028 HCHG SURGERY MINUTES - 1ST 30 MINS LEVEL 3: Performed by: SPECIALIST

## 2025-07-22 PROCEDURE — G0156 HHCP-SVS OF AIDE,EA 15 MIN: HCPCS

## 2025-07-22 PROCEDURE — 160025 RECOVERY II MINUTES (STATS): Performed by: SPECIALIST

## 2025-07-22 PROCEDURE — 86850 RBC ANTIBODY SCREEN: CPT

## 2025-07-22 PROCEDURE — 88360 TUMOR IMMUNOHISTOCHEM/MANUAL: CPT | Performed by: PATHOLOGY

## 2025-07-22 PROCEDURE — 160039 HCHG SURGERY MINUTES - EA ADDL 1 MIN LEVEL 3: Performed by: SPECIALIST

## 2025-07-22 PROCEDURE — 700101 HCHG RX REV CODE 250: Performed by: ANESTHESIOLOGY

## 2025-07-22 PROCEDURE — 160048 HCHG OR STATISTICAL LEVEL 1-5: Performed by: SPECIALIST

## 2025-07-22 PROCEDURE — 86901 BLOOD TYPING SEROLOGIC RH(D): CPT

## 2025-07-22 PROCEDURE — 88305 TISSUE EXAM BY PATHOLOGIST: CPT | Performed by: PATHOLOGY

## 2025-07-22 PROCEDURE — A9270 NON-COVERED ITEM OR SERVICE: HCPCS | Performed by: ANESTHESIOLOGY

## 2025-07-22 RX ORDER — CEFAZOLIN SODIUM 1 G/3ML
INJECTION, POWDER, FOR SOLUTION INTRAMUSCULAR; INTRAVENOUS PRN
Status: DISCONTINUED | OUTPATIENT
Start: 2025-07-22 | End: 2025-07-22 | Stop reason: SURG

## 2025-07-22 RX ORDER — ONDANSETRON 2 MG/ML
INJECTION INTRAMUSCULAR; INTRAVENOUS PRN
Status: DISCONTINUED | OUTPATIENT
Start: 2025-07-22 | End: 2025-07-22 | Stop reason: SURG

## 2025-07-22 RX ORDER — EPHEDRINE SULFATE 50 MG/ML
INJECTION, SOLUTION INTRAVENOUS PRN
Status: DISCONTINUED | OUTPATIENT
Start: 2025-07-22 | End: 2025-07-22 | Stop reason: SURG

## 2025-07-22 RX ORDER — MIDAZOLAM HYDROCHLORIDE 1 MG/ML
1 INJECTION INTRAMUSCULAR; INTRAVENOUS
Status: DISCONTINUED | OUTPATIENT
Start: 2025-07-22 | End: 2025-07-22 | Stop reason: HOSPADM

## 2025-07-22 RX ORDER — SODIUM CHLORIDE, SODIUM LACTATE, POTASSIUM CHLORIDE, CALCIUM CHLORIDE 600; 310; 30; 20 MG/100ML; MG/100ML; MG/100ML; MG/100ML
INJECTION, SOLUTION INTRAVENOUS CONTINUOUS
Status: ACTIVE | OUTPATIENT
Start: 2025-07-22 | End: 2025-07-22

## 2025-07-22 RX ORDER — METOPROLOL TARTRATE 1 MG/ML
INJECTION, SOLUTION INTRAVENOUS PRN
Status: DISCONTINUED | OUTPATIENT
Start: 2025-07-22 | End: 2025-07-22 | Stop reason: SURG

## 2025-07-22 RX ORDER — DEXMEDETOMIDINE HYDROCHLORIDE 100 UG/ML
INJECTION, SOLUTION INTRAVENOUS PRN
Status: DISCONTINUED | OUTPATIENT
Start: 2025-07-22 | End: 2025-07-22 | Stop reason: SURG

## 2025-07-22 RX ORDER — SODIUM CHLORIDE, SODIUM LACTATE, POTASSIUM CHLORIDE, CALCIUM CHLORIDE 600; 310; 30; 20 MG/100ML; MG/100ML; MG/100ML; MG/100ML
INJECTION, SOLUTION INTRAVENOUS CONTINUOUS
Status: DISCONTINUED | OUTPATIENT
Start: 2025-07-22 | End: 2025-07-22 | Stop reason: HOSPADM

## 2025-07-22 RX ORDER — HYDRALAZINE HYDROCHLORIDE 20 MG/ML
INJECTION INTRAMUSCULAR; INTRAVENOUS PRN
Status: DISCONTINUED | OUTPATIENT
Start: 2025-07-22 | End: 2025-07-22 | Stop reason: SURG

## 2025-07-22 RX ORDER — HYDROMORPHONE HYDROCHLORIDE 1 MG/ML
0.2 INJECTION, SOLUTION INTRAMUSCULAR; INTRAVENOUS; SUBCUTANEOUS
Status: DISCONTINUED | OUTPATIENT
Start: 2025-07-22 | End: 2025-07-22 | Stop reason: HOSPADM

## 2025-07-22 RX ORDER — HYDROMORPHONE HYDROCHLORIDE 1 MG/ML
0.1 INJECTION, SOLUTION INTRAMUSCULAR; INTRAVENOUS; SUBCUTANEOUS
Status: DISCONTINUED | OUTPATIENT
Start: 2025-07-22 | End: 2025-07-22 | Stop reason: HOSPADM

## 2025-07-22 RX ORDER — ROCURONIUM BROMIDE 10 MG/ML
INJECTION, SOLUTION INTRAVENOUS PRN
Status: DISCONTINUED | OUTPATIENT
Start: 2025-07-22 | End: 2025-07-22 | Stop reason: SURG

## 2025-07-22 RX ORDER — HYDRALAZINE HYDROCHLORIDE 20 MG/ML
5 INJECTION INTRAMUSCULAR; INTRAVENOUS
Status: DISCONTINUED | OUTPATIENT
Start: 2025-07-22 | End: 2025-07-22 | Stop reason: HOSPADM

## 2025-07-22 RX ORDER — DEXAMETHASONE SODIUM PHOSPHATE 4 MG/ML
INJECTION, SOLUTION INTRA-ARTICULAR; INTRALESIONAL; INTRAMUSCULAR; INTRAVENOUS; SOFT TISSUE PRN
Status: DISCONTINUED | OUTPATIENT
Start: 2025-07-22 | End: 2025-07-22 | Stop reason: SURG

## 2025-07-22 RX ORDER — ALBUTEROL SULFATE 5 MG/ML
2.5 SOLUTION RESPIRATORY (INHALATION)
Status: DISCONTINUED | OUTPATIENT
Start: 2025-07-22 | End: 2025-07-22 | Stop reason: HOSPADM

## 2025-07-22 RX ORDER — OXYCODONE HCL 5 MG/5 ML
5 SOLUTION, ORAL ORAL
Status: COMPLETED | OUTPATIENT
Start: 2025-07-22 | End: 2025-07-22

## 2025-07-22 RX ORDER — HALOPERIDOL 5 MG/ML
1 INJECTION INTRAMUSCULAR
Status: DISCONTINUED | OUTPATIENT
Start: 2025-07-22 | End: 2025-07-22 | Stop reason: HOSPADM

## 2025-07-22 RX ORDER — DIPHENHYDRAMINE HYDROCHLORIDE 50 MG/ML
12.5 INJECTION, SOLUTION INTRAMUSCULAR; INTRAVENOUS
Status: DISCONTINUED | OUTPATIENT
Start: 2025-07-22 | End: 2025-07-22 | Stop reason: HOSPADM

## 2025-07-22 RX ORDER — CEFDINIR 300 MG/1
300 CAPSULE ORAL EVERY 12 HOURS
COMMUNITY
End: 2025-07-24

## 2025-07-22 RX ORDER — BUPIVACAINE HYDROCHLORIDE AND EPINEPHRINE 2.5; 5 MG/ML; UG/ML
INJECTION, SOLUTION EPIDURAL; INFILTRATION; INTRACAUDAL; PERINEURAL
Status: DISCONTINUED | OUTPATIENT
Start: 2025-07-22 | End: 2025-07-22 | Stop reason: HOSPADM

## 2025-07-22 RX ORDER — ONDANSETRON 2 MG/ML
4 INJECTION INTRAMUSCULAR; INTRAVENOUS
Status: DISCONTINUED | OUTPATIENT
Start: 2025-07-22 | End: 2025-07-22 | Stop reason: HOSPADM

## 2025-07-22 RX ORDER — HYDROMORPHONE HYDROCHLORIDE 1 MG/ML
0.4 INJECTION, SOLUTION INTRAMUSCULAR; INTRAVENOUS; SUBCUTANEOUS
Status: DISCONTINUED | OUTPATIENT
Start: 2025-07-22 | End: 2025-07-22 | Stop reason: HOSPADM

## 2025-07-22 RX ORDER — OXYCODONE HCL 5 MG/5 ML
10 SOLUTION, ORAL ORAL
Status: COMPLETED | OUTPATIENT
Start: 2025-07-22 | End: 2025-07-22

## 2025-07-22 RX ORDER — EPHEDRINE SULFATE 50 MG/ML
5 INJECTION, SOLUTION INTRAVENOUS
Status: DISCONTINUED | OUTPATIENT
Start: 2025-07-22 | End: 2025-07-22 | Stop reason: HOSPADM

## 2025-07-22 RX ADMIN — PROPOFOL 15 MG: 10 INJECTION, EMULSION INTRAVENOUS at 16:54

## 2025-07-22 RX ADMIN — METOPROLOL TARTRATE 1 MG: 5 INJECTION INTRAVENOUS at 17:35

## 2025-07-22 RX ADMIN — PROPOFOL 25 MCG/KG/MIN: 10 INJECTION, EMULSION INTRAVENOUS at 16:55

## 2025-07-22 RX ADMIN — HYDRALAZINE HYDROCHLORIDE 2 MG: 20 INJECTION, SOLUTION INTRAMUSCULAR; INTRAVENOUS at 17:10

## 2025-07-22 RX ADMIN — HYDRALAZINE HYDROCHLORIDE 2 MG: 20 INJECTION, SOLUTION INTRAMUSCULAR; INTRAVENOUS at 17:16

## 2025-07-22 RX ADMIN — FENTANYL CITRATE 25 MCG: 50 INJECTION, SOLUTION INTRAMUSCULAR; INTRAVENOUS at 17:13

## 2025-07-22 RX ADMIN — FENTANYL CITRATE 25 MCG: 50 INJECTION, SOLUTION INTRAMUSCULAR; INTRAVENOUS at 18:15

## 2025-07-22 RX ADMIN — ONDANSETRON 4 MG: 2 INJECTION INTRAMUSCULAR; INTRAVENOUS at 17:03

## 2025-07-22 RX ADMIN — SODIUM CHLORIDE, POTASSIUM CHLORIDE, SODIUM LACTATE AND CALCIUM CHLORIDE: 600; 310; 30; 20 INJECTION, SOLUTION INTRAVENOUS at 16:35

## 2025-07-22 RX ADMIN — EPHEDRINE SULFATE 5 MG: 50 INJECTION, SOLUTION INTRAVENOUS at 17:08

## 2025-07-22 RX ADMIN — DEXAMETHASONE SODIUM PHOSPHATE 4 MG: 4 INJECTION INTRA-ARTICULAR; INTRALESIONAL; INTRAMUSCULAR; INTRAVENOUS; SOFT TISSUE at 16:52

## 2025-07-22 RX ADMIN — FENTANYL CITRATE 25 MCG: 50 INJECTION, SOLUTION INTRAMUSCULAR; INTRAVENOUS at 17:45

## 2025-07-22 RX ADMIN — METOPROLOL TARTRATE 2 MG: 5 INJECTION INTRAVENOUS at 17:10

## 2025-07-22 RX ADMIN — OXYCODONE HYDROCHLORIDE 5 MG: 5 SOLUTION ORAL at 18:16

## 2025-07-22 RX ADMIN — ROCURONIUM BROMIDE 25 MG: 10 INJECTION INTRAVENOUS at 16:50

## 2025-07-22 RX ADMIN — FENTANYL CITRATE 25 MCG: 50 INJECTION, SOLUTION INTRAMUSCULAR; INTRAVENOUS at 18:08

## 2025-07-22 RX ADMIN — METOPROLOL TARTRATE 1 MG: 5 INJECTION INTRAVENOUS at 17:14

## 2025-07-22 RX ADMIN — FENTANYL CITRATE 50 MCG: 50 INJECTION, SOLUTION INTRAMUSCULAR; INTRAVENOUS at 16:44

## 2025-07-22 RX ADMIN — DEXMEDETOMIDINE 5 MCG: 100 INJECTION, SOLUTION INTRAVENOUS at 17:43

## 2025-07-22 RX ADMIN — CEFAZOLIN 2 G: 1 INJECTION, POWDER, FOR SOLUTION INTRAMUSCULAR; INTRAVENOUS at 16:53

## 2025-07-22 RX ADMIN — PROPOFOL 25 MG: 10 INJECTION, EMULSION INTRAVENOUS at 16:52

## 2025-07-22 RX ADMIN — SUGAMMADEX 200 MG: 100 INJECTION, SOLUTION INTRAVENOUS at 17:42

## 2025-07-22 RX ADMIN — HYDRALAZINE HYDROCHLORIDE 2 MG: 20 INJECTION, SOLUTION INTRAMUSCULAR; INTRAVENOUS at 17:20

## 2025-07-22 RX ADMIN — PROPOFOL 75 MG: 10 INJECTION, EMULSION INTRAVENOUS at 16:50

## 2025-07-22 ASSESSMENT — PAIN DESCRIPTION - PAIN TYPE
TYPE: SURGICAL PAIN

## 2025-07-22 ASSESSMENT — PAIN SCALES - GENERAL: PAIN_LEVEL: 1

## 2025-07-22 ASSESSMENT — FIBROSIS 4 INDEX: FIB4 SCORE: 0.88

## 2025-07-22 NOTE — ANESTHESIA PREPROCEDURE EVALUATION
Case: [id number] Date/Time: 07/22/25 1435    Procedures:       DIAGNOSTIC LAPAROSCOPY WITH OMENTAL TISSUE BIOPSY      BIOPSY, ABDOMINAL WALL    Pre-op diagnosis: PERITONEUM CANCER    Location: [facility] OR [facility] / SURGERY [facility]    Surgeons: Agapito Lancaster M.D.          79 yo female    P Med Hx:  Lung disease, home O2 dependent 3 liters  Arthritis  Heart burn  Indigestion  Cataract  CAD (coronary artery disease)  Cancer   Hypertension  Urinary incontinence  PONV (postoperative nausea and vomiting)  Dental disorder  Relevant Problems   PULMONARY   (positive) Pneumonia due to infectious organism      CARDIAC   (positive) CAD (coronary artery disease)   (positive) Hypertension       Physical Exam    Airway   Mallampati: II  TM distance: >3 FB  Neck ROM: full       Cardiovascular - normal exam  Rhythm: regular  Rate: normal    (-) murmur     Dental - normal exam           Pulmonary - normal examBreath sounds clear to auscultation     Abdominal    Neurological - normal exam                   Anesthesia Plan    ASA 3   ASA physical status 3 criteria: respiratory insufficiency    Plan - general       Airway plan will be ETT          Induction: intravenous    Postoperative Plan: Postoperative administration of opioids is intended.    Pertinent diagnostic labs and testing reviewed    Informed Consent:    Anesthetic plan and risks discussed with patient.    Use of blood products discussed with: patient whom consented to blood products.

## 2025-07-22 NOTE — PROGRESS NOTES
Pharmacy Medication Reconciliation      ~Medication reconciliation updated and complete per patient & son at bedside  ~Allergies have been verified and updated     ~Is dispense history available in EPIC: yes  ~Patient home pharmacy :  [facility] [phone]      ~Anticoagulants (rivaroxaban, apixaban, edoxaban, dabigatran, warfarin, enoxaparin) taken in the last 14 days? NO         Patient started a 10 day course of Cefdinir 300mg BID on 6/23/25 but only took 3 doses because was admitted to hospital.

## 2025-07-23 LAB
FUNGUS SPEC CULT: NORMAL
FUNGUS SPEC CULT: NORMAL
FUNGUS SPEC FUNGUS STN: NORMAL
FUNGUS SPEC FUNGUS STN: NORMAL
PATHOLOGY CONSULT NOTE: NORMAL
SIGNIFICANT IND 70042: NORMAL
SIGNIFICANT IND 70042: NORMAL
SITE SITE: NORMAL
SITE SITE: NORMAL
SOURCE SOURCE: NORMAL
SOURCE SOURCE: NORMAL

## 2025-07-23 PROCEDURE — 665999 HH PPS REVENUE DEBIT

## 2025-07-23 PROCEDURE — 665998 HH PPS REVENUE CREDIT

## 2025-07-23 NOTE — OP REPORT
PreOp Diagnosis: 20 cm complex ovarian mass  Ascites status post paracentesis with none diagnosis  Status post 1 cycle of neoadjuvant chemotherapy  Abdominal and peritoneal and diaphragm peritoneal seeding  Stage IIIc ovarian cancer        PostOp Diagnosis: Same as above        Procedure(s):  DIAGNOSTIC LAPAROSCOPY WITH OMENTAL TISSUE BIOPSY - Wound Class: Clean     Surgeon(s):  Agapito Lancaster M.D.     Anesthesiologist/Type of Anesthesia:  Anesthesiologist: Primo Christianson M.D./General     Surgical Staff:  Circulator: Raeann Michael R.N.  Scrub Person: Linda Miguel  First Assist: Roxann Meyer P.A.-C.     Specimens removed if any:  ID Type Source Tests Collected by Time Destination   A : DIAPHRAGM PERITONEAL BIOPSY Other Other PATHOLOGY SPECIMEN Agapito Lancaster M.D. 7/22/2025  5:15 PM     B : OMENTAL NODULE Other Other PATHOLOGY SPECIMEN Agapito Lancaster M.D. 7/22/2025  5:21 PM           Estimated Blood Loss: Minimal     Findings: There was minimal ascites that was noted.  There were lots of filmy adhesions throughout the abdomen the omentum grossly did not have omental caking but there were nodules on the portion of the omentum as noted.  The liver was adherent to the diaphragm there were lots of filmy adhesions these filmy adhesions were taken down and after mobilizing the liver off the diaphragm peritoneum we noted seeding along the diaphragm.  We biopsied this 1 lesion that was very very cheesy we sent it off for frozen.  There was an infracolic omentum that had omental nodule adherent to the sidewall which we took down and took a biopsy of this.  There was seeding along the right and left diaphragm peritoneum liver capsule was smooth stomach.  Grossly normal.  Again the omentum did not have gross evidence of omental caking the entire omentum was not completely encased with cancer there were some nodules which are estimated to be maybe about less than 1 cm on the surface of the omentum but majority of the  omentum appeared grossly normal.  The small bowel mesentery was unremarkable there was minimal disease located paracolic gutter peritoneum contained seeding there was seeding along the lower abdomen peritoneum I cannot clearly appreciate the bladder peritoneum the a large cystic mass was noted it was unclear where it was emanating from.  Clinically is consistent with stage IIIc ovarian cancer.  It appears that patient has had some response after 1 cycle of neoadjuvant chemotherapy as the tumor was quite easy and not as vascular.     Complications: none    Indication: Mrs. Garcia is a pleasant 80-year-old female whom I was asked to consult on 2 weeks ago when she presented with abdominal pain to the hospital.  Patient presented with a large 20 cm pelvic mass with abdominal ascites and pleural effusion. Patient underwent a thoracentesis and cytology was negative.  I was consulted on the case.  After reviewing the films extensive discussion was undertaken regarding treatment versus hospice care.  I suspect that the patient has great stage IIIc ovarian cancer possibly stage IV disease.  Patient desires to proceed with treatment the patient was advised to undergo neoadjuvant chemotherapy.  She received 1 cycle of neoadjuvant chemotherapy.  However her thoracentesis fluid came back as negative there is no diagnosis of a definitive diagnosis of cancer thus patient is was advised to undergo diagnostic laparoscopy with biopsy to establish pathological diagnosis.  Thus risk benefits and rationale procedures were reviewed with patient in detail patient is understanding of these risks wished to proceed with surgery as planned.    Surgical assistant was indicated in this procedure.  The surgical assist held the laparoscopy camera so that I can perform the biopsies.  The surgical assistant also provided countertraction and irrigation to expose my operative field so that I can safely perform a biopsy.  Surgical assistant also  "help closure in the laparoscopic incision.    Procedure: Procedure: After achieving adequate anesthesia patient was prepped and draped and placed in supine position.  I elected to enter through the Ascencio's point mainly because of the 20 cm mass that was about a right up against the umbilicus.  Thus a 5 mm incision was made at the left upper quadrant.  Veress needle was introduced without difficulty, pressure was noted to be less than 5.  I did insufflate the peritoneal cavity to abdominal pressure 15 mmHg.  Laparoscopy was then inserted finding was noted above which there were filmy adhesions.  I was then able to identify the plicas and we made a second port entry at the level of the umbilicus a 5 mm port was then placed along here.  This afforded me to place an instrumentation to take the adhesions down and after the adhesions were down taken down appropriately a third port was then placed in the left anterior abdominal wall at the same line as the umbilicus.  This was performed under direct laparoscopic visualization to prevent any internal organ injury at the distal port was then placed my assistant held the camera wire went ahead and performed further lysis of adhesions.  I then took an biopsy of the omentum nodule that was adherent to the anterior abdominal wall this was then delivered through one of our port site.  After taking down the liver I also took a biopsy of the right diaphragm peritoneum for tissue diagnosis.  After completion of this we performed detailed exploratory laparoscopy findings are noted as above.  I think patient is responding to treatment even after 1 cycle based on the fact that the tumor implants were quite 'cheezy\".     After assuring that our hemostasis was established we count for sponges needles instrument was accounted for pneumoperitoneum was allowed to escape through the 5 mm port.  Subcutaneous fat was irrigated warted the skin was reapproximated with 3-0 Monocryl " sutures.    Patient tolerated procedure well without any difficulties was extubated and transferred to the PACU in stable condition.

## 2025-07-23 NOTE — DISCHARGE INSTRUCTIONS
HOME CARE INSTRUCTIONS    ACTIVITY: Rest and take it easy for the first 24 hours.  A responsible adult is recommended to remain with you during that time.  It is normal to feel sleepy.  We encourage you to not do anything that requires balance, judgment or coordination.    FOR 24 HOURS DO NOT:  Drive, operate machinery or run household appliances.  Drink beer or alcoholic beverages.  Make important decisions or sign legal documents.    SPECIAL INSTRUCTIONS:     No heavy lifting greater than 10 pounds until cleared by our office after your physical examination.  2.   Nothing in the vagina (no tampons, douching, intercourse) until cleared by our office after your physical examination.  3.   No driving while taking narcotics.  4.   Call our office [phone] if you develop any fever, chills, nausea/vomiting, heavy vaginal bleeding, or redness, tenderness, and or drainage from your wound, if you have persistent watery discharge while ambulating or stool draining from the vagina.  5.  Showering with warm water is ok, no bathing, swimming or hot tubing for at least 6 weeks to avoid infection.  6.  You may remove post operative dressing day 1,and place loose bandages for two weeks.  7.  You may have vaginal spotting or light bleeding which is normal.  8. If you not had a bowel movement for 2 days, please take over the counter Milk of Magnesia, 1 tablespoon every 4 hours, after 4 doses and if you still have not had a bowel movement, please call your doctor.  9.  You may eat a soft diet such as soup, liquid, for day #1 and if tolerating you may resume your regular diet.      Diagnostic Laparoscopy, Care After  The following information offers guidance on how to care for yourself after your procedure. Your health care provider may also give you more specific instructions. If you have problems or questions, contact your health care provider.  What can I expect after the procedure?  After the procedure, it is common to  have:  Mild discomfort in the abdomen.  Sore throat.  Women who have laparoscopy with a pelvic examination may have mild cramping and fluid coming from the vagina for a few days after the procedure.  Follow these instructions at home:  Medicines  Take over-the-counter and prescription medicines only as told by your health care provider.  If you were prescribed an antibiotic medicine, take it as told by your health care provider. Do not stop taking the antibiotic even if you start to feel better.  Ask your health care provider if the medicine prescribed to you:  Requires you to avoid driving or using machinery.  Can cause constipation. You may need to take these actions to prevent or treat constipation:  Drink enough fluid to keep your urine pale yellow.  Take over-the-counter or prescription medicines.  Eat foods that are high in fiber, such as beans, whole grains, and fresh fruits and vegetables.  Limit foods that are high in fat and processed sugars, such as fried or sweet foods.  Incision care    Follow instructions from your health care provider about how to take care of your incisions. Make sure you:  Wash your hands with soap and water for at least 20 seconds before and after you change your bandage (dressing). If soap and water are not available, use hand .  Change your dressing as told by your health care provider.  Leave stitches (sutures), skin glue, or surgical tape in place. These skin closures may need to stay in place for 2 weeks or longer. If surgical tape edges start to loosen and curl up, you may trim the loose edges. Do not remove the surgical tape completely unless your health care provider tells you to do that.  Check your incision areas every day for signs of infection. Check for:  Redness, swelling, or pain.  Fluid or blood.  Warmth.  Pus or a bad smell.  Activity  Return to your normal activities as told by your health care provider. Ask your health care provider what activities are  safe for you.  Do not lift anything that is heavier than 10 lb (4.5 kg), or the limit that you are told, until your health care provider says that it is safe.  Avoid sitting for a long time without moving. Get up to take short walks every 1-2 hours. This is important to improve blood flow and breathing. Ask for help if you feel weak or unsteady.  General instructions  Do not use any products that contain nicotine or tobacco. These products include cigarettes, chewing tobacco, and vaping devices, such as e-cigarettes. If you need help quitting, ask your health care provider.  If you were given a sedative during the procedure, it can affect you for several hours. Do not drive or operate machinery until your health care provider says that it is safe.  Do not take baths, swim, or use a hot tub until your health care provider approves. Ask your health care provider if you may take showers. You may only be allowed to take sponge baths.  Keep all follow-up visits. This is important.  Contact a health care provider if:  You develop shoulder pain.  You feel light-headed or faint.  You are unable to pass gas or have a bowel movement.  You feel nauseous or you vomit.  You develop a rash.  You have any of these signs of infection:  Redness, swelling, or pain around an incision.  Fluid or blood coming from an incision.  Warmth coming from an incision.  Pus or a bad smell coming from an incision.  A fever or chills.  Get help right away if:  You have severe pain.  You have vomiting that does not go away.  You have heavy bleeding from the vagina.  Any incision opens up.  You have trouble breathing.  You have chest pain.  These symptoms may represent a serious problem that is an emergency. Do not wait to see if the symptoms will go away. Get medical help right away. Call your local emergency services (911 in the [place] Do not drive yourself to the hospital.  Summary  After the procedure, it is common to have mild discomfort in the  abdomen and a sore throat.  Check your incision areas every day for signs of infection.  Return to your normal activities as told by your health care provider. Ask your health care provider what activities are safe for you.  This information is not intended to replace advice given to you by your health care provider. Make sure you discuss any questions you have with your health care provider.  Document Revised: 08/13/2021 Document Reviewed: 08/13/2021  [employer] Patient Education © 2023 [employer]      DIET: To avoid nausea, slowly advance diet as tolerated, avoiding spicy or greasy foods for the first day.  Add more substantial food to your diet according to your physician's instructions.  Babies can be fed formula or breast milk as soon as they are hungry.  INCREASE FLUIDS AND FIBER TO AVOID CONSTIPATION.    SURGICAL DRESSING/BATHING: Okay to shower and remove dressings in 48 hours. No submerging into water until cleared by MD.    MEDICATIONS: Resume taking daily medication.  Take prescribed pain medication with food.  If no medication is prescribed, you may take non-aspirin pain medication if needed.  PAIN MEDICATION CAN BE VERY CONSTIPATING.  Take a stool softener or laxative such as senokot, pericolace, or milk of magnesia if needed.    No new Prescription given. Last pain medication given at 6:16 pm. Oxycodone.    A follow-up appointment is scheduled on August 6, 2025 at 11.30.    You should CALL YOUR PHYSICIAN if you develop:  Fever greater than 101 degrees F.  Pain not relieved by medication, or persistent nausea or vomiting.  Excessive bleeding (blood soaking through dressing) or unexpected drainage from the wound.  Extreme redness or swelling around the incision site, drainage of pus or foul smelling drainage.  Inability to urinate or empty your bladder within 8 hours.  Problems with breathing or chest pain.    You should call 911 if you develop problems with breathing or chest pain.  If you are unable  to contact your doctor or surgical center, you should go to the nearest emergency room or urgent care center.  Physician's telephone #: Dr. Lancaster [phone]     MILD FLU-LIKE SYMPTOMS ARE NORMAL.  YOU MAY EXPERIENCE GENERALIZED MUSCLE ACHES, THROAT IRRITATION, HEADACHE AND/OR SOME NAUSEA.    If any questions arise, call your doctor.  If your doctor is not available, please feel free to call the Surgical Center at [phone].  The Center is open Monday through Friday from 7AM to 7PM.      A registered nurse may call you a few days after your surgery to see how you are doing after your procedure.    You may also receive a survey in the mail within the next two weeks and we ask that you take a few moments to complete the survey and return it to us.  Our goal is to provide you with very good care and we value your comments.     Depression / Suicide Risk    As you are discharged from this [facility] facility, it is important to learn how to keep safe from harming yourself.    Recognize the warning signs:  Abrupt changes in personality, positive or negative- including increase in energy   Giving away possessions  Change in eating patterns- significant weight changes-  positive or negative  Change in sleeping patterns- unable to sleep or sleeping all the time   Unwillingness or inability to communicate  Depression  Unusual sadness, discouragement and loneliness  Talk of wanting to die  Neglect of personal appearance   Rebelliousness- reckless behavior  Withdrawal from people/activities they love  Confusion- inability to concentrate     If you or a loved one observes any of these behaviors or has concerns about self-harm, here's what you can do:  Talk about it- your feelings and reasons for harming yourself  Remove any means that you might use to hurt yourself (examples: pills, rope, extension cords, firearm)  Get professional help from the community (Mental Health, Substance Abuse, psychological counseling)  Do not  be alone:Call your Safe Contact- someone whom you trust who will be there for you.  Call your local CRISIS HOTLINE 791-8787 or 336-783-6653  Call your local Children's Mobile Crisis Response Team [place] (517) 649-9066 or www.BucketFeet  Call the toll free National Suicide Prevention Hotlines   National Suicide Prevention Lifeline 089-489-LBZM (3610)  [facility] 800-DMFKOVG (081-4456)    I acknowledge receipt and understanding of these Home Care instructions.

## 2025-07-23 NOTE — ANESTHESIA TIME REPORT
Anesthesia Start and Stop Event Times       Date Time Event    7/22/2025 1543 Ready for Procedure     1643 Anesthesia Start     1755 Anesthesia Stop          Responsible Staff  07/22/25      Name Role Begin End    Primo Christianson M.D. Anesth 1643 1759          Overtime Reason:  no overtime (within assigned shift)    Comments:

## 2025-07-23 NOTE — ANESTHESIA PROCEDURE NOTES
Airway    Date/Time: 7/22/2025 4:52 PM    Performed by: Primo Christianson M.D.  Authorized by: Primo Christianson M.D.    Location:  OR  Urgency:  Elective  Indications for Airway Management:  Anesthesia      Spontaneous Ventilation: absent    Sedation Level:  Deep  Preoxygenated: Yes    Patient Position:  Sniffing  Mask Difficulty Assessment:  1 - vent by mask  Final Airway Type:  Endotracheal airway  Final Endotracheal Airway:  ETT  Cuffed: Yes    Technique Used for Successful ETT Placement:  Direct laryngoscopy    Insertion Site:  Oral  Blade Type:  Glide  Laryngoscope Blade/Videolaryngoscope Blade Size:  3  ETT Size (mm):  7.0  Measured from:  Lips  ETT to Lips (cm):  20  Placement Verified by: auscultation and capnometry    Cormack-Lehane Classification:  Grade IIa - partial view of glottis  Number of Attempts at Approach:  1

## 2025-07-23 NOTE — OR SURGEON
Immediate Post OP Note    PreOp Diagnosis: 20 cm complex ovarian mass  Ascites status post paracentesis with none diagnosis  Status post 1 cycle of neoadjuvant chemotherapy  Abdominal and peritoneal and diaphragm peritoneal seeding  Stage IIIc ovarian cancer      PostOp Diagnosis: Same as above      Procedure(s):  DIAGNOSTIC LAPAROSCOPY WITH OMENTAL TISSUE BIOPSY - Wound Class: Clean    Surgeon(s):  Agapito Lancaster M.D.    Anesthesiologist/Type of Anesthesia:  Anesthesiologist: Primo Christianson M.D./General    Surgical Staff:  Circulator: Raeann Michael R.N.  Scrub Person: Linda Miguel  First Assist: Roxann Meyer P.A.-C.    Specimens removed if any:  ID Type Source Tests Collected by Time Destination   A : DIAPHRAGM PERITONEAL BIOPSY Other Other PATHOLOGY SPECIMEN Agapito Lancaster M.D. 7/22/2025  5:15 PM    B : OMENTAL NODULE Other Other PATHOLOGY SPECIMEN Agapito Lancaster M.D. 7/22/2025  5:21 PM        Estimated Blood Loss: Minimal    Findings: There was minimal ascites that was noted.  There were lots of filmy adhesions throughout the abdomen the omentum grossly did not have omental caking but there were nodules on the portion of the omentum as noted.  The liver was adherent to the diaphragm there were lots of filmy adhesions these filmy adhesions were taken down and after mobilizing the liver off the diaphragm peritoneum we noted seeding along the diaphragm.  We biopsied this 1 lesion that was very very cheesy we sent it off for frozen.  There was an infracolic omentum that had omental nodule adherent to the sidewall which we took down and took a biopsy of this.  There was seeding along the right and left diaphragm peritoneum liver capsule was smooth stomach.  Grossly normal.  Again the omentum did not have gross evidence of omental caking the entire omentum was not completely encased with cancer there were some nodules which are estimated to be maybe about less than 1 cm on the surface of the omentum but  majority of the omentum appeared grossly normal.  The small bowel mesentery was unremarkable there was minimal disease located paracolic gutter peritoneum contained seeding there was seeding along the lower abdomen peritoneum I cannot clearly appreciate the bladder peritoneum the a large cystic mass was noted it was unclear where it was emanating from.  Clinically is consistent with stage IIIc ovarian cancer.  It appears that patient has had some response after 1 cycle of neoadjuvant chemotherapy as the tumor was quite easy and not as vascular.    Complications: none        7/22/2025 6:18 PM Agapito Lancaster M.D.

## 2025-07-23 NOTE — OR NURSING
Arrived from PACU at 1930 AAXO4, Pt's VSS; denies N/V; states pain is at tolerable level. Dressing CDI to abdomen. Pt already voided in PACU.    D/c orders received. Pt changed into clothing with assistance. IV dc'd. Discharge reviewed, Pt and family verbalized understanding and questions answered. Patient states ready to d/c home. Pt dc'd in w/c with son. Home with 0xygen. (Pt at baseline 3 l/nc). Belongings returned including personal oxygen tank and walker. No report of any missing belongings.

## 2025-07-23 NOTE — ANESTHESIA POSTPROCEDURE EVALUATION
Patient: Sheree Garcia    Procedure Summary       Date: 07/22/25 Room / Location: [place] [id number] / SURGERY [facility]    Anesthesia Start: 1643 Anesthesia Stop: 1755    Procedure: DIAGNOSTIC LAPAROSCOPY WITH OMENTAL TISSUE BIOPSY Diagnosis: (STAGE III OVARIAN CANCER)    Surgeons: Agapito Lancaster M.D. Responsible Provider: Primo Christianson M.D.    Anesthesia Type: general ASA Status: 3            Final Anesthesia Type: general  Last vitals  BP   Blood Pressure : 117/59    Temp   37.3 °C (99.2 °F)    Pulse   73   Resp   19    SpO2   97 %      Anesthesia Post Evaluation    Patient location during evaluation: PACU  Patient participation: complete - patient participated  Level of consciousness: awake and alert  Pain score: 1    Airway patency: patent  Anesthetic complications: no  Cardiovascular status: hemodynamically stable  Respiratory status: acceptable  Hydration status: euvolemic    PONV: none          There were no known notable events for this encounter.     Nurse Pain Score: 3 (NPRS)

## 2025-07-24 ENCOUNTER — HOME CARE VISIT (OUTPATIENT)
Dept: HOME HEALTH SERVICES | Facility: HOME HEALTHCARE | Age: 80
End: 2025-07-24
Payer: MEDICARE

## 2025-07-24 VITALS
TEMPERATURE: 98.4 F | SYSTOLIC BLOOD PRESSURE: 100 MMHG | RESPIRATION RATE: 18 BRPM | BODY MASS INDEX: 25.4 KG/M2 | HEART RATE: 80 BPM | DIASTOLIC BLOOD PRESSURE: 58 MMHG | WEIGHT: 148 LBS | OXYGEN SATURATION: 98 %

## 2025-07-24 PROCEDURE — 665998 HH PPS REVENUE CREDIT

## 2025-07-24 PROCEDURE — G0156 HHCP-SVS OF AIDE,EA 15 MIN: HCPCS

## 2025-07-24 PROCEDURE — G0152 HHCP-SERV OF OT,EA 15 MIN: HCPCS

## 2025-07-24 PROCEDURE — 665999 HH PPS REVENUE DEBIT

## 2025-07-24 PROCEDURE — G0151 HHCP-SERV OF PT,EA 15 MIN: HCPCS

## 2025-07-24 SDOH — ECONOMIC STABILITY: HOUSING INSECURITY: EVIDENCE OF SMOKING MATERIAL: 0

## 2025-07-24 ASSESSMENT — FIBROSIS 4 INDEX: FIB4 SCORE: 0.88

## 2025-07-24 ASSESSMENT — ENCOUNTER SYMPTOMS
PAIN SEVERITY GOAL: 0/10
SUBJECTIVE PAIN PROGRESSION: GRADUALLY IMPROVING
DIFFICULTY THINKING: 1
PAIN LOCATION - EXACERBATING FACTORS: MOVEMENT, BENDING
PAIN LOCATION - PAIN DURATION: DAILY
PAIN LOCATION - PAIN FREQUENCY: CONSTANT
LOWEST PAIN SEVERITY IN PAST 24 HOURS: 4/10
HIGHEST PAIN SEVERITY IN PAST 24 HOURS: 4/10
PAIN: 1
PAIN LOCATION - PAIN SEVERITY: 4/10
PAIN LOCATION - RELIEVING FACTORS: RESTING

## 2025-07-25 ENCOUNTER — HOME CARE VISIT (OUTPATIENT)
Dept: HOME HEALTH SERVICES | Facility: HOME HEALTHCARE | Age: 80
End: 2025-07-25
Payer: MEDICARE

## 2025-07-25 VITALS
OXYGEN SATURATION: 98 % | RESPIRATION RATE: 18 BRPM | TEMPERATURE: 98.4 F | HEART RATE: 80 BPM | SYSTOLIC BLOOD PRESSURE: 102 MMHG | DIASTOLIC BLOOD PRESSURE: 58 MMHG

## 2025-07-25 VITALS
TEMPERATURE: 98.6 F | SYSTOLIC BLOOD PRESSURE: 120 MMHG | DIASTOLIC BLOOD PRESSURE: 70 MMHG | OXYGEN SATURATION: 98 % | RESPIRATION RATE: 18 BRPM | HEART RATE: 80 BPM

## 2025-07-25 PROCEDURE — 665998 HH PPS REVENUE CREDIT

## 2025-07-25 PROCEDURE — G0299 HHS/HOSPICE OF RN EA 15 MIN: HCPCS

## 2025-07-25 PROCEDURE — 665999 HH PPS REVENUE DEBIT

## 2025-07-25 SDOH — ECONOMIC STABILITY: HOUSING INSECURITY: EVIDENCE OF SMOKING MATERIAL: 0

## 2025-07-25 ASSESSMENT — ENCOUNTER SYMPTOMS
STOOL FREQUENCY: DAILY
NAUSEA: DENIES
PAIN: PAIN LIMITS FUNCTION DAILY NOT CONSTANT
MUSCLE WEAKNESS: 1
BOWEL PATTERN NORMAL: 1
PAIN LOCATION - PAIN FREQUENCY: INTERMITTENT
PAIN SEVERITY GOAL: 0/10
LAST BOWEL MOVEMENT: 67411
PAIN LOCATION - PAIN QUALITY: DULL
LOWEST PAIN SEVERITY IN PAST 24 HOURS: 2/10
SUBJECTIVE PAIN PROGRESSION: UNCHANGED
SUBJECTIVE PAIN PROGRESSION: WAXING AND WANING
PAIN LOCATION - PAIN SEVERITY: 4/10
HIGHEST PAIN SEVERITY IN PAST 24 HOURS: 4/10
VOMITING: DENIES
PAIN LOCATION - PAIN QUALITY: ACHE,
HIGHEST PAIN SEVERITY IN PAST 24 HOURS: 3/10
PAIN LOCATION - RELIEVING FACTORS: REST, MEDICATION
PAIN LOCATION: ABD
PAIN: 1
PAIN LOCATION: UPPER ABDOMEN
PAIN: 1
PAIN LOCATION - PAIN FREQUENCY: CONSTANT
FATIGUE: 1
PAIN SEVERITY GOAL: 0/10
PAIN LOCATION - EXACERBATING FACTORS: MOVEMENT
LOWEST PAIN SEVERITY IN PAST 24 HOURS: 3/10
PAIN LOCATION - PAIN SEVERITY: 3/10

## 2025-07-25 ASSESSMENT — PATIENT HEALTH QUESTIONNAIRE - PHQ9: CLINICAL INTERPRETATION OF PHQ2 SCORE: 0

## 2025-07-26 PROCEDURE — 665998 HH PPS REVENUE CREDIT

## 2025-07-26 PROCEDURE — 665999 HH PPS REVENUE DEBIT

## 2025-07-27 PROCEDURE — 665999 HH PPS REVENUE DEBIT

## 2025-07-27 PROCEDURE — 665998 HH PPS REVENUE CREDIT

## 2025-07-28 ENCOUNTER — HOME CARE VISIT (OUTPATIENT)
Dept: HOME HEALTH SERVICES | Facility: HOME HEALTHCARE | Age: 80
End: 2025-07-28
Payer: MEDICARE

## 2025-07-28 PROCEDURE — 665999 HH PPS REVENUE DEBIT

## 2025-07-28 PROCEDURE — 665998 HH PPS REVENUE CREDIT

## 2025-07-29 ENCOUNTER — HOME CARE VISIT (OUTPATIENT)
Dept: HOME HEALTH SERVICES | Facility: HOME HEALTHCARE | Age: 80
End: 2025-07-29
Payer: MEDICARE

## 2025-07-29 VITALS
DIASTOLIC BLOOD PRESSURE: 58 MMHG | HEART RATE: 84 BPM | SYSTOLIC BLOOD PRESSURE: 106 MMHG | TEMPERATURE: 98.5 F | OXYGEN SATURATION: 98 % | RESPIRATION RATE: 14 BRPM

## 2025-07-29 VITALS
DIASTOLIC BLOOD PRESSURE: 58 MMHG | SYSTOLIC BLOOD PRESSURE: 106 MMHG | RESPIRATION RATE: 18 BRPM | OXYGEN SATURATION: 98 % | HEART RATE: 84 BPM | TEMPERATURE: 98.5 F

## 2025-07-29 PROCEDURE — G0156 HHCP-SVS OF AIDE,EA 15 MIN: HCPCS

## 2025-07-29 PROCEDURE — 665998 HH PPS REVENUE CREDIT

## 2025-07-29 PROCEDURE — G0151 HHCP-SERV OF PT,EA 15 MIN: HCPCS

## 2025-07-29 PROCEDURE — 665999 HH PPS REVENUE DEBIT

## 2025-07-29 PROCEDURE — G0299 HHS/HOSPICE OF RN EA 15 MIN: HCPCS

## 2025-07-29 SDOH — ECONOMIC STABILITY: HOUSING INSECURITY: EVIDENCE OF SMOKING MATERIAL: 0

## 2025-07-29 ASSESSMENT — ENCOUNTER SYMPTOMS
PAIN: PAIN NOT LIMITING
PAIN LOCATION - PAIN FREQUENCY: CONSTANT
SUBJECTIVE PAIN PROGRESSION: WAXING AND WANING
BOWEL PATTERN NORMAL: 1
PAIN SEVERITY GOAL: 0/10
PAIN LOCATION - EXACERBATING FACTORS: STRESS
PAIN LOCATION - PAIN QUALITY: ACHE
LAST BOWEL MOVEMENT: 67415
PAIN: 1
LOWEST PAIN SEVERITY IN PAST 24 HOURS: 0/10
PAIN LOCATION - RELIEVING FACTORS: MEDICATION
MUSCLE WEAKNESS: 1
HIGHEST PAIN SEVERITY IN PAST 24 HOURS: 2/10
NAUSEA: DENIES
PAIN LOCATION: HEADACHE
STOOL FREQUENCY: LESS THAN DAILY
DENIES PAIN: 1
PAIN LOCATION - PAIN DURATION: LESS THAN DAILY
VOMITING: DENIES
PAIN LOCATION - PAIN SEVERITY: 2/10

## 2025-07-29 ASSESSMENT — PATIENT HEALTH QUESTIONNAIRE - PHQ9: CLINICAL INTERPRETATION OF PHQ2 SCORE: 0

## 2025-07-30 PROCEDURE — 665998 HH PPS REVENUE CREDIT

## 2025-07-30 PROCEDURE — 665999 HH PPS REVENUE DEBIT

## 2025-07-30 ASSESSMENT — ENCOUNTER SYMPTOMS
PERSON REPORTING PAIN: PATIENT
PAIN: 1

## 2025-07-31 ENCOUNTER — HOME CARE VISIT (OUTPATIENT)
Dept: HOME HEALTH SERVICES | Facility: HOME HEALTHCARE | Age: 80
End: 2025-07-31
Payer: MEDICARE

## 2025-07-31 VITALS
SYSTOLIC BLOOD PRESSURE: 102 MMHG | DIASTOLIC BLOOD PRESSURE: 52 MMHG | RESPIRATION RATE: 20 BRPM | OXYGEN SATURATION: 98 % | TEMPERATURE: 98.2 F | HEART RATE: 72 BPM

## 2025-07-31 PROCEDURE — G0151 HHCP-SERV OF PT,EA 15 MIN: HCPCS

## 2025-07-31 PROCEDURE — G0156 HHCP-SVS OF AIDE,EA 15 MIN: HCPCS

## 2025-07-31 ASSESSMENT — ENCOUNTER SYMPTOMS: DENIES PAIN: 1

## 2025-07-31 NOTE — Clinical Note
Physical therapy reassessment performed 7/31/2025 and I will continue 2 times a week for 4 weeks effective 8/3/2025.  Further insurance authorization may be required

## 2025-08-01 NOTE — HOME HEALTH
PHYSICAL THERAPY REASSESSMENT AND PLAN OF CARE     ·       Patient:  Sheree Garcia            Patient has made good gains with skilled restorative home care physical therapy services.  Patient has met most of her previous goals and would like to continue to have skilled restorative physical therapy services to achieve her prior level of function or at least more independence.  Patient would also like to be able to wean off her oxygen so therapist sent an order to PCP requesting staff to have the ability to start to wean the patient off her oxygen.  Patient does express some concern about backsliding once she starts to have chemotherapy again.  Patient continues to need assistance to safely exit/return home with or without a device due to need for using oxygen at all times.  Patient would like to continue with skilled restorative home care physical therapy services and therapist agrees that she would benefit.  ·      ·       Skilled Therapeutic Need: Decreased activity tolerance, LE weakness, Balance control, Generalized deconditioning, Gait training, Fall prevention and Knowledge of condition     Recommend skilled HHPT to address deficits and improve function-report sent to MD     ·       Frequency:   2 times for 4 weeks,  Effective 8/3/2025     ·       Goals: Effective 8/3/2025  Pt will have improved LE strength as evidenced by ability to perform 20 consecutive stand/sits without UE support in 8 visits.  Pt will demonstrate improved dynamic balance to allow for ambulation in uneven surfaces without a device without loss of balance in 8 visits.  Pt will ambulate at least 500 continuous feet without a rest or significant SOB in 8 visits.  Pt will improve static balance to allow for single balance eye open for at least 15 seconds each leg in 8 visits.  Pt will be independent in up from floor using furniture as needed independently in 8 visits.  Pt will ascend/descend curbs/slopes/ stairs independently using  device as needed in 8 visits.    Does the patient get SOB with Maximum exertion?  yes mild with stair use    How often (if at all) does pain interfere with patient's movements?  not typically

## 2025-08-02 ENCOUNTER — HOME CARE VISIT (OUTPATIENT)
Dept: HOME HEALTH SERVICES | Facility: HOME HEALTHCARE | Age: 80
End: 2025-08-02
Payer: MEDICARE

## 2025-08-02 VITALS
RESPIRATION RATE: 16 BRPM | HEART RATE: 77 BPM | SYSTOLIC BLOOD PRESSURE: 116 MMHG | DIASTOLIC BLOOD PRESSURE: 70 MMHG | OXYGEN SATURATION: 98 % | TEMPERATURE: 97.9 F

## 2025-08-02 PROCEDURE — G0299 HHS/HOSPICE OF RN EA 15 MIN: HCPCS

## 2025-08-02 ASSESSMENT — ENCOUNTER SYMPTOMS: DENIES PAIN: 1

## 2025-08-03 ENCOUNTER — HOME CARE VISIT (OUTPATIENT)
Dept: HOME HEALTH SERVICES | Facility: HOME HEALTHCARE | Age: 80
End: 2025-08-03
Payer: MEDICARE

## 2025-08-03 ASSESSMENT — ENCOUNTER SYMPTOMS
LAST BOWEL MOVEMENT: 67419
STOOL FREQUENCY: DAILY
BOWEL PATTERN NORMAL: 1
MUSCLE WEAKNESS: 1

## 2025-08-05 ENCOUNTER — HOME CARE VISIT (OUTPATIENT)
Dept: HOME HEALTH SERVICES | Facility: HOME HEALTHCARE | Age: 80
End: 2025-08-05
Payer: MEDICARE

## 2025-08-05 VITALS
RESPIRATION RATE: 16 BRPM | SYSTOLIC BLOOD PRESSURE: 126 MMHG | TEMPERATURE: 98.4 F | OXYGEN SATURATION: 97 % | HEART RATE: 72 BPM | DIASTOLIC BLOOD PRESSURE: 64 MMHG

## 2025-08-05 VITALS
OXYGEN SATURATION: 98 % | SYSTOLIC BLOOD PRESSURE: 126 MMHG | DIASTOLIC BLOOD PRESSURE: 64 MMHG | RESPIRATION RATE: 17 BRPM | HEART RATE: 73 BPM | TEMPERATURE: 98.3 F

## 2025-08-05 VITALS
RESPIRATION RATE: 17 BRPM | DIASTOLIC BLOOD PRESSURE: 64 MMHG | OXYGEN SATURATION: 98 % | HEART RATE: 73 BPM | TEMPERATURE: 98.3 F | SYSTOLIC BLOOD PRESSURE: 126 MMHG

## 2025-08-05 PROCEDURE — G0152 HHCP-SERV OF OT,EA 15 MIN: HCPCS

## 2025-08-05 PROCEDURE — G0299 HHS/HOSPICE OF RN EA 15 MIN: HCPCS

## 2025-08-05 PROCEDURE — G0151 HHCP-SERV OF PT,EA 15 MIN: HCPCS

## 2025-08-05 PROCEDURE — G0156 HHCP-SVS OF AIDE,EA 15 MIN: HCPCS

## 2025-08-05 SDOH — ECONOMIC STABILITY: HOUSING INSECURITY: EVIDENCE OF SMOKING MATERIAL: 0

## 2025-08-05 SDOH — ECONOMIC STABILITY: HOUSING INSECURITY: HOME SAFETY: EDUCATION ON GRAB BAR PLACEMENT AND/OR USE OF TOILET RAILS IN ENSUITE BATHROOM.

## 2025-08-05 ASSESSMENT — ENCOUNTER SYMPTOMS
DENIES PAIN: 1
DENIES PAIN: 1
PERSON REPORTING PAIN: PATIENT
DIFFICULTY THINKING: 1
DENIES PAIN: 1

## 2025-08-05 ASSESSMENT — ACTIVITIES OF DAILY LIVING (ADL)
DRESSING_LB_CURRENT_FUNCTION: INDEPENDENT
PREPARING MEALS: NEEDS ASSISTANCE
LAUNDRY ASSESSED: 1
TRANSPORTATION ASSESSED: 1
FEEDING ASSESSED: 1
USING THE TELPHONE: INDEPENDENT
CURRENT_FUNCTION: SUPERVISION
ORAL_CARE_CURRENT_FUNCTION: INDEPENDENT
FEEDING: INDEPENDENT
ORAL_CARE_ASSESSED: 1
HOUSEKEEPING ASSESSED: 1
TOILETING: INDEPENDENT
SHOPPING: DEPENDENT
GROOMING ASSESSED: 1
SHOPPING ASSESSED: 1
PHYSICAL_TRANSFERS_DEVICES: TTB
LAUNDRY: NEEDS ASSISTANCE
LIGHT HOUSEKEEPING: DEPENDENT
BATHING_CURRENT_FUNCTION: SUPERVISION
DRESSING_UB_CURRENT_FUNCTION: INDEPENDENT
GROOMING_CURRENT_FUNCTION: INDEPENDENT
TOILETING: 1
TOILETING EQUIPMENT USED: RAISED TOILET SEAT WITH HANDLES
TELEPHONE USE ASSESSED: 1
AMBULATION ASSISTANCE: SUPERVISION
TRANSPORTATION: DEPENDENT
BATHING ASSESSED: 1
PHYSICAL TRANSFERS ASSESSED: 1
AMBULATION ASSISTANCE: 1

## 2025-08-06 VITALS
RESPIRATION RATE: 17 BRPM | TEMPERATURE: 98.3 F | SYSTOLIC BLOOD PRESSURE: 126 MMHG | OXYGEN SATURATION: 98 % | HEART RATE: 73 BPM | DIASTOLIC BLOOD PRESSURE: 64 MMHG

## 2025-08-06 ASSESSMENT — ENCOUNTER SYMPTOMS
STOOL FREQUENCY: LESS THAN DAILY
LAST BOWEL MOVEMENT: 67422
BOWEL PATTERN NORMAL: 1
VOMITING: DENIES
DENIES PAIN: 1
NAUSEA: DENIES
FATIGUE: 1
MUSCLE WEAKNESS: 1

## 2025-08-06 ASSESSMENT — PATIENT HEALTH QUESTIONNAIRE - PHQ9: CLINICAL INTERPRETATION OF PHQ2 SCORE: 0

## 2025-08-07 ENCOUNTER — HOME CARE VISIT (OUTPATIENT)
Dept: HOME HEALTH SERVICES | Facility: HOME HEALTHCARE | Age: 80
End: 2025-08-07
Payer: MEDICARE

## 2025-08-07 VITALS
HEART RATE: 84 BPM | SYSTOLIC BLOOD PRESSURE: 130 MMHG | OXYGEN SATURATION: 98 % | TEMPERATURE: 98.2 F | RESPIRATION RATE: 24 BRPM | DIASTOLIC BLOOD PRESSURE: 62 MMHG

## 2025-08-07 PROCEDURE — G0151 HHCP-SERV OF PT,EA 15 MIN: HCPCS

## 2025-08-07 ASSESSMENT — ENCOUNTER SYMPTOMS: DENIES PAIN: 1

## 2025-08-08 ENCOUNTER — HOME CARE VISIT (OUTPATIENT)
Dept: HOME HEALTH SERVICES | Facility: HOME HEALTHCARE | Age: 80
End: 2025-08-08
Payer: MEDICARE

## 2025-08-08 VITALS
OXYGEN SATURATION: 97 % | TEMPERATURE: 97.2 F | SYSTOLIC BLOOD PRESSURE: 124 MMHG | RESPIRATION RATE: 18 BRPM | DIASTOLIC BLOOD PRESSURE: 62 MMHG | HEART RATE: 69 BPM

## 2025-08-08 PROCEDURE — G0156 HHCP-SVS OF AIDE,EA 15 MIN: HCPCS

## 2025-08-08 ASSESSMENT — ENCOUNTER SYMPTOMS
DENIES PAIN: 1
PERSON REPORTING PAIN: PATIENT

## 2025-08-11 ENCOUNTER — HOSPITAL ENCOUNTER (OUTPATIENT)
Dept: LAB | Facility: MEDICAL CENTER | Age: 80
End: 2025-08-11
Attending: SPECIALIST
Payer: MEDICARE

## 2025-08-11 ENCOUNTER — HOSPITAL ENCOUNTER (EMERGENCY)
Facility: MEDICAL CENTER | Age: 80
End: 2025-08-11
Payer: MEDICARE

## 2025-08-11 ENCOUNTER — HOME CARE VISIT (OUTPATIENT)
Dept: HOME HEALTH SERVICES | Facility: HOME HEALTHCARE | Age: 80
End: 2025-08-11
Payer: MEDICARE

## 2025-08-11 ENCOUNTER — HOSPITAL ENCOUNTER (OUTPATIENT)
Facility: MEDICAL CENTER | Age: 80
End: 2025-08-11
Attending: SPECIALIST
Payer: MEDICARE

## 2025-08-11 VITALS
SYSTOLIC BLOOD PRESSURE: 110 MMHG | OXYGEN SATURATION: 98 % | RESPIRATION RATE: 18 BRPM | TEMPERATURE: 97.7 F | DIASTOLIC BLOOD PRESSURE: 65 MMHG | HEART RATE: 70 BPM

## 2025-08-11 VITALS
SYSTOLIC BLOOD PRESSURE: 141 MMHG | TEMPERATURE: 97.4 F | DIASTOLIC BLOOD PRESSURE: 77 MMHG | OXYGEN SATURATION: 97 % | HEART RATE: 81 BPM | RESPIRATION RATE: 18 BRPM

## 2025-08-11 LAB
ALBUMIN SERPL BCP-MCNC: 3.9 G/DL (ref 3.2–4.9)
ALBUMIN/GLOB SERPL: 1.4 G/DL
ALP SERPL-CCNC: 110 U/L (ref 30–99)
ALT SERPL-CCNC: 18 U/L (ref 2–50)
ANION GAP SERPL CALC-SCNC: 12 MMOL/L (ref 7–16)
APPEARANCE UR: CLEAR
AST SERPL-CCNC: 21 U/L (ref 12–45)
BACTERIA #/AREA URNS HPF: NORMAL /HPF
BASOPHILS # BLD AUTO: 1 % (ref 0–1.8)
BASOPHILS # BLD: 0.06 K/UL (ref 0–0.12)
BILIRUB SERPL-MCNC: 0.3 MG/DL (ref 0.1–1.5)
BILIRUB UR QL STRIP.AUTO: NEGATIVE
BUN SERPL-MCNC: 11 MG/DL (ref 8–22)
CALCIUM ALBUM COR SERPL-MCNC: 9.5 MG/DL (ref 8.5–10.5)
CALCIUM SERPL-MCNC: 9.4 MG/DL (ref 8.5–10.5)
CANCER AG125 SERPL-ACNC: 713 U/ML (ref 0–35)
CASTS URNS QL MICRO: NORMAL /LPF (ref 0–2)
CHLORIDE SERPL-SCNC: 101 MMOL/L (ref 96–112)
CO2 SERPL-SCNC: 21 MMOL/L (ref 20–33)
COLOR UR: YELLOW
CREAT SERPL-MCNC: 0.56 MG/DL (ref 0.5–1.4)
CREAT UR-MCNC: 79.3 MG/DL
EOSINOPHIL # BLD AUTO: 0.56 K/UL (ref 0–0.51)
EOSINOPHIL NFR BLD: 9.2 % (ref 0–6.9)
EPITHELIAL CELLS 1715: NORMAL /HPF (ref 0–5)
ERYTHROCYTE [DISTWIDTH] IN BLOOD BY AUTOMATED COUNT: 64.6 FL (ref 35.9–50)
GFR SERPLBLD CREATININE-BSD FMLA CKD-EPI: 92 ML/MIN/1.73 M 2
GLOBULIN SER CALC-MCNC: 2.8 G/DL (ref 1.9–3.5)
GLUCOSE SERPL-MCNC: 96 MG/DL (ref 65–99)
GLUCOSE UR STRIP.AUTO-MCNC: NEGATIVE MG/DL
HCT VFR BLD AUTO: 37.2 % (ref 37–47)
HGB BLD-MCNC: 11.5 G/DL (ref 12–16)
IMM GRANULOCYTES # BLD AUTO: 0.02 K/UL (ref 0–0.11)
IMM GRANULOCYTES NFR BLD AUTO: 0.3 % (ref 0–0.9)
KETONES UR STRIP.AUTO-MCNC: NEGATIVE MG/DL
LEUKOCYTE ESTERASE UR QL STRIP.AUTO: ABNORMAL
LYMPHOCYTES # BLD AUTO: 1.37 K/UL (ref 1–4.8)
LYMPHOCYTES NFR BLD: 22.6 % (ref 22–41)
MCH RBC QN AUTO: 26.6 PG (ref 27–33)
MCHC RBC AUTO-ENTMCNC: 30.9 G/DL (ref 32.2–35.5)
MCV RBC AUTO: 85.9 FL (ref 81.4–97.8)
MICRO URNS: ABNORMAL
MONOCYTES # BLD AUTO: 0.41 K/UL (ref 0–0.85)
MONOCYTES NFR BLD AUTO: 6.8 % (ref 0–13.4)
NEUTROPHILS # BLD AUTO: 3.65 K/UL (ref 1.82–7.42)
NEUTROPHILS NFR BLD: 60.1 % (ref 44–72)
NITRITE UR QL STRIP.AUTO: NEGATIVE
NRBC # BLD AUTO: 0 K/UL
NRBC BLD-RTO: 0 /100 WBC (ref 0–0.2)
PH UR STRIP.AUTO: 5.5 [PH] (ref 5–8)
PLATELET # BLD AUTO: 306 K/UL (ref 164–446)
PMV BLD AUTO: 10.4 FL (ref 9–12.9)
POTASSIUM SERPL-SCNC: 4.3 MMOL/L (ref 3.6–5.5)
PROT SERPL-MCNC: 6.7 G/DL (ref 6–8.2)
PROT UR QL STRIP: NEGATIVE MG/DL
PROT UR-MCNC: 14.5 MG/DL (ref 0–15)
PROT/CREAT UR: 183 MG/G (ref 10–107)
RBC # BLD AUTO: 4.33 M/UL (ref 4.2–5.4)
RBC # URNS HPF: NORMAL /HPF (ref 0–2)
RBC UR QL AUTO: NEGATIVE
SODIUM SERPL-SCNC: 134 MMOL/L (ref 135–145)
SP GR UR STRIP.AUTO: 1.01
UROBILINOGEN UR STRIP.AUTO-MCNC: 0.2 EU/DL
WBC # BLD AUTO: 6.1 K/UL (ref 4.8–10.8)
WBC #/AREA URNS HPF: NORMAL /HPF

## 2025-08-11 PROCEDURE — 86304 IMMUNOASSAY TUMOR CA 125: CPT

## 2025-08-11 PROCEDURE — 84156 ASSAY OF PROTEIN URINE: CPT

## 2025-08-11 PROCEDURE — 302449 STATCHG TRIAGE ONLY (STATISTIC)

## 2025-08-11 PROCEDURE — 80053 COMPREHEN METABOLIC PANEL: CPT

## 2025-08-11 PROCEDURE — 36415 COLL VENOUS BLD VENIPUNCTURE: CPT

## 2025-08-11 PROCEDURE — G0299 HHS/HOSPICE OF RN EA 15 MIN: HCPCS

## 2025-08-11 PROCEDURE — 85025 COMPLETE CBC W/AUTO DIFF WBC: CPT

## 2025-08-11 PROCEDURE — 81001 URINALYSIS AUTO W/SCOPE: CPT

## 2025-08-11 PROCEDURE — 82570 ASSAY OF URINE CREATININE: CPT

## 2025-08-11 ASSESSMENT — ENCOUNTER SYMPTOMS
LAST BOWEL MOVEMENT: 67428
BOWEL PATTERN NORMAL: 1
NAUSEA: DENIES
DENIES PAIN: 1
VOMITING: DENIES
STOOL FREQUENCY: LESS THAN DAILY

## 2025-08-11 ASSESSMENT — PATIENT HEALTH QUESTIONNAIRE - PHQ9: CLINICAL INTERPRETATION OF PHQ2 SCORE: 0

## 2025-08-14 ENCOUNTER — HOME CARE VISIT (OUTPATIENT)
Dept: HOME HEALTH SERVICES | Facility: HOME HEALTHCARE | Age: 80
End: 2025-08-14
Payer: MEDICARE

## 2025-08-14 VITALS
OXYGEN SATURATION: 98 % | TEMPERATURE: 97.3 F | HEART RATE: 66 BPM | DIASTOLIC BLOOD PRESSURE: 60 MMHG | RESPIRATION RATE: 16 BRPM | SYSTOLIC BLOOD PRESSURE: 132 MMHG

## 2025-08-14 VITALS
RESPIRATION RATE: 16 BRPM | TEMPERATURE: 98.1 F | OXYGEN SATURATION: 98 % | SYSTOLIC BLOOD PRESSURE: 130 MMHG | DIASTOLIC BLOOD PRESSURE: 70 MMHG | HEART RATE: 68 BPM

## 2025-08-14 PROCEDURE — G0151 HHCP-SERV OF PT,EA 15 MIN: HCPCS

## 2025-08-14 PROCEDURE — G0152 HHCP-SERV OF OT,EA 15 MIN: HCPCS

## 2025-08-14 ASSESSMENT — ENCOUNTER SYMPTOMS
PAIN LOCATION - RELIEVING FACTORS: REST, MEDICATION
PAIN LOCATION - PAIN FREQUENCY: INTERMITTENT
PAIN LOCATION - EXACERBATING FACTORS: NO PATTERN
PAIN LOCATION - PAIN DURATION: DAILY
PAIN LOCATION - PAIN FREQUENCY: INTERMITTENT
PAIN LOCATION - PAIN SEVERITY: 4/10
PAIN LOCATION - PAIN QUALITY: ACHE
PAIN LOCATION - PAIN SEVERITY: 4/10
LOWEST PAIN SEVERITY IN PAST 24 HOURS: 0/10
PAIN LOCATION: R SHOULDER
SUBJECTIVE PAIN PROGRESSION: WAXING AND WANING
PAIN LOCATION - EXACERBATING FACTORS: MOVEMENT
HIGHEST PAIN SEVERITY IN PAST 24 HOURS: 4/10
PAIN LOCATION - PAIN QUALITY: ACHE
PAIN LOCATION - RELIEVING FACTORS: MEDICATION
PAIN LOCATION: HEADACHE
PAIN: 1
PAIN SEVERITY GOAL: 2/10
PAIN LOCATION - PAIN DURATION: LESS THAN DAILY

## 2025-08-15 ENCOUNTER — HOME CARE VISIT (OUTPATIENT)
Dept: HOME HEALTH SERVICES | Facility: HOME HEALTHCARE | Age: 80
End: 2025-08-15
Payer: MEDICARE

## 2025-08-15 ASSESSMENT — ENCOUNTER SYMPTOMS
PAIN LOCATION - PAIN SEVERITY: 4/10
PAIN LOCATION - PAIN DURATION: INTERMITTENT
PAIN LOCATION - EXACERBATING FACTORS: ACTIVITY
HIGHEST PAIN SEVERITY IN PAST 24 HOURS: 4/10
PAIN SEVERITY GOAL: 0/10
PAIN LOCATION - PAIN QUALITY: ACHE
PAIN LOCATION: R SHOULDER
SUBJECTIVE PAIN PROGRESSION: UNCHANGED
PAIN: 1
PAIN LOCATION - PAIN FREQUENCY: WITH ACTIVITY
PAIN LOCATION - RELIEVING FACTORS: REST
LOWEST PAIN SEVERITY IN PAST 24 HOURS: 0/10

## 2025-08-15 ASSESSMENT — ACTIVITIES OF DAILY LIVING (ADL)
TELEPHONE USE ASSESSED: 1
FEEDING ASSESSED: 1
GROOMING_CURRENT_FUNCTION: INDEPENDENT
TRANSPORTATION: NEEDS ASSISTANCE
TRANSPORTATION ASSESSED: 1
ORAL_CARE_CURRENT_FUNCTION: INDEPENDENT
USING THE TELPHONE: INDEPENDENT
DRESSING_LB_CURRENT_FUNCTION: INDEPENDENT
BATHING_CURRENT_FUNCTION: INDEPENDENT
PREPARING MEALS: INDEPENDENT
ORAL_CARE_ASSESSED: 1
LAUNDRY: NEEDS ASSISTANCE
SHOPPING ASSESSED: 1
HOUSEKEEPING ASSESSED: 1
LIGHT HOUSEKEEPING: NEEDS ASSISTANCE
SHOPPING: NEEDS ASSISTANCE
FEEDING: INDEPENDENT
GROOMING ASSESSED: 1
LAUNDRY ASSESSED: 1
AMBULATION ASSISTANCE: INDEPENDENT
TOILETING: INDEPENDENT
DRESSING_UB_CURRENT_FUNCTION: INDEPENDENT
PHYSICAL TRANSFERS ASSESSED: 1
BATHING ASSESSED: 1
TOILETING: 1
CURRENT_FUNCTION: INDEPENDENT
AMBULATION ASSISTANCE: 1

## 2025-08-16 ENCOUNTER — HOME CARE VISIT (OUTPATIENT)
Dept: HOME HEALTH SERVICES | Facility: HOME HEALTHCARE | Age: 80
End: 2025-08-16
Payer: MEDICARE

## 2025-08-18 ENCOUNTER — HOME CARE VISIT (OUTPATIENT)
Dept: HOME HEALTH SERVICES | Facility: HOME HEALTHCARE | Age: 80
End: 2025-08-18
Payer: MEDICARE

## 2025-08-18 ENCOUNTER — DOCUMENTATION (OUTPATIENT)
Dept: MEDICAL GROUP | Facility: PHYSICIAN GROUP | Age: 80
End: 2025-08-18
Payer: MEDICARE

## 2025-08-18 VITALS
OXYGEN SATURATION: 98 % | RESPIRATION RATE: 16 BRPM | BODY MASS INDEX: 25.4 KG/M2 | DIASTOLIC BLOOD PRESSURE: 80 MMHG | WEIGHT: 148 LBS | HEART RATE: 77 BPM | SYSTOLIC BLOOD PRESSURE: 120 MMHG | TEMPERATURE: 97.7 F

## 2025-08-18 PROCEDURE — G0299 HHS/HOSPICE OF RN EA 15 MIN: HCPCS

## 2025-08-18 ASSESSMENT — ACTIVITIES OF DAILY LIVING (ADL)
TOILETING: SUPERVISION
BATHING_WITHIN_DEFINED_LIMITS: 1
ORAL_CARE_CURRENT_FUNCTION: NEEDS ASSISTANCE
CURRENT_FUNCTION: SUPERVISION
SHOPPING ASSESSED: 1
GROOMING_WITHIN_DEFINED_LIMITS: 1
AMBULATION ASSISTANCE: SUPERVISION
TRANSPORTATION: DEPENDENT
TOILETING: 1
PREPARING MEALS: NEEDS ASSISTANCE
AMBULATION ASSISTANCE: 1
BATHING_CURRENT_FUNCTION: SUPERVISION
FEEDING: SUPERVISION
USING THE TELPHONE: NEEDS ASSISTANCE
LIGHT HOUSEKEEPING: DEPENDENT
ORAL_CARE_ASSESSED: 1
HOUSEKEEPING ASSESSED: 1
DRESSING_LB_CURRENT_FUNCTION: SUPERVISION
TELEPHONE USE ASSESSED: 1
BATHING ASSESSED: 1
PHYSICAL TRANSFERS ASSESSED: 1
FEEDING ASSESSED: 1
LAUNDRY ASSESSED: 1
SHOPPING: DEPENDENT
GROOMING_CURRENT_FUNCTION: SUPERVISION
GROOMING ASSESSED: 1
TRANSPORTATION ASSESSED: 1
LAUNDRY: DEPENDENT
FEEDING_WITHIN_DEFINED_LIMITS: 1
DRESSING_UB_CURRENT_FUNCTION: SUPERVISION

## 2025-08-18 ASSESSMENT — FIBROSIS 4 INDEX: FIB4 SCORE: 1.29

## 2025-08-18 ASSESSMENT — ENCOUNTER SYMPTOMS
LAST BOWEL MOVEMENT: 67435
DENIES PAIN: 1
STOOL FREQUENCY: DAILY
BOWEL PATTERN NORMAL: 1

## 2025-08-18 ASSESSMENT — PATIENT HEALTH QUESTIONNAIRE - PHQ9: CLINICAL INTERPRETATION OF PHQ2 SCORE: 0

## 2025-08-19 ENCOUNTER — HOME CARE VISIT (OUTPATIENT)
Dept: HOME HEALTH SERVICES | Facility: HOME HEALTHCARE | Age: 80
End: 2025-08-19
Payer: MEDICARE

## 2025-08-19 VITALS
SYSTOLIC BLOOD PRESSURE: 80 MMHG | DIASTOLIC BLOOD PRESSURE: 56 MMHG | TEMPERATURE: 97.6 F | HEART RATE: 88 BPM | RESPIRATION RATE: 20 BRPM | OXYGEN SATURATION: 97 %

## 2025-08-19 PROCEDURE — G0151 HHCP-SERV OF PT,EA 15 MIN: HCPCS

## 2025-08-19 ASSESSMENT — ENCOUNTER SYMPTOMS: DENIES PAIN: 1

## 2025-08-21 ENCOUNTER — HOME CARE VISIT (OUTPATIENT)
Dept: HOME HEALTH SERVICES | Facility: HOME HEALTHCARE | Age: 80
End: 2025-08-21
Payer: MEDICARE

## 2025-08-21 VITALS
HEART RATE: 80 BPM | RESPIRATION RATE: 16 BRPM | DIASTOLIC BLOOD PRESSURE: 68 MMHG | TEMPERATURE: 97.7 F | SYSTOLIC BLOOD PRESSURE: 118 MMHG | OXYGEN SATURATION: 98 %

## 2025-08-21 PROCEDURE — G0151 HHCP-SERV OF PT,EA 15 MIN: HCPCS

## 2025-08-21 ASSESSMENT — ENCOUNTER SYMPTOMS: DENIES PAIN: 1

## 2025-08-22 LAB
FINAL REPORT Q0603: NORMAL
PRELIMINARY RPT Q0601: NORMAL
RHODAMINE-AURAMINE STN SPEC: NORMAL

## 2025-08-26 ENCOUNTER — HOME CARE VISIT (OUTPATIENT)
Dept: HOME HEALTH SERVICES | Facility: HOME HEALTHCARE | Age: 80
End: 2025-08-26
Payer: MEDICARE

## 2025-08-26 VITALS
OXYGEN SATURATION: 98 % | DIASTOLIC BLOOD PRESSURE: 60 MMHG | RESPIRATION RATE: 18 BRPM | SYSTOLIC BLOOD PRESSURE: 120 MMHG | TEMPERATURE: 98.6 F | HEART RATE: 80 BPM

## 2025-08-26 PROCEDURE — G0151 HHCP-SERV OF PT,EA 15 MIN: HCPCS

## 2025-08-26 ASSESSMENT — ENCOUNTER SYMPTOMS: DENIES PAIN: 1

## 2025-08-27 ENCOUNTER — HOME CARE VISIT (OUTPATIENT)
Dept: HOME HEALTH SERVICES | Facility: HOME HEALTHCARE | Age: 80
End: 2025-08-27
Payer: MEDICARE

## 2025-08-28 ENCOUNTER — HOME CARE VISIT (OUTPATIENT)
Dept: HOME HEALTH SERVICES | Facility: HOME HEALTHCARE | Age: 80
End: 2025-08-28
Payer: MEDICARE

## 2025-08-28 VITALS
HEART RATE: 80 BPM | DIASTOLIC BLOOD PRESSURE: 64 MMHG | SYSTOLIC BLOOD PRESSURE: 120 MMHG | TEMPERATURE: 98.2 F | OXYGEN SATURATION: 97 % | RESPIRATION RATE: 16 BRPM

## 2025-08-28 PROCEDURE — G0151 HHCP-SERV OF PT,EA 15 MIN: HCPCS

## 2025-08-28 ASSESSMENT — ACTIVITIES OF DAILY LIVING (ADL): OASIS_M1830: 01

## 2025-08-28 ASSESSMENT — PATIENT HEALTH QUESTIONNAIRE - PHQ9: CLINICAL INTERPRETATION OF PHQ2 SCORE: 0

## 2025-08-28 ASSESSMENT — ENCOUNTER SYMPTOMS: DENIES PAIN: 1

## 2025-08-29 ENCOUNTER — HOME CARE VISIT (OUTPATIENT)
Dept: HOME HEALTH SERVICES | Facility: HOME HEALTHCARE | Age: 80
End: 2025-08-29
Payer: MEDICARE

## 2025-08-29 ENCOUNTER — DOCUMENTATION (OUTPATIENT)
Dept: MEDICAL GROUP | Facility: PHYSICIAN GROUP | Age: 80
End: 2025-08-29
Payer: MEDICARE

## 2025-08-29 ENCOUNTER — HOSPITAL ENCOUNTER (OUTPATIENT)
Facility: MEDICAL CENTER | Age: 80
End: 2025-08-29
Attending: SPECIALIST
Payer: MEDICARE

## 2025-08-29 VITALS
TEMPERATURE: 97.6 F | RESPIRATION RATE: 16 BRPM | OXYGEN SATURATION: 98 % | DIASTOLIC BLOOD PRESSURE: 78 MMHG | SYSTOLIC BLOOD PRESSURE: 128 MMHG | HEART RATE: 76 BPM

## 2025-08-29 LAB
ALBUMIN SERPL BCP-MCNC: 3.8 G/DL (ref 3.2–4.9)
ALBUMIN/GLOB SERPL: 1.4 G/DL
ALP SERPL-CCNC: 142 U/L (ref 30–99)
ALT SERPL-CCNC: 39 U/L (ref 2–50)
ANION GAP SERPL CALC-SCNC: 9 MMOL/L (ref 7–16)
ANISOCYTOSIS BLD QL SMEAR: ABNORMAL
APPEARANCE UR: CLEAR
AST SERPL-CCNC: 31 U/L (ref 12–45)
BASOPHILS # BLD AUTO: 0.5 % (ref 0–1.8)
BASOPHILS # BLD: 0.04 K/UL (ref 0–0.12)
BILIRUB SERPL-MCNC: 0.3 MG/DL (ref 0.1–1.5)
BILIRUB UR QL STRIP.AUTO: NEGATIVE
BUN SERPL-MCNC: 18 MG/DL (ref 8–22)
CALCIUM ALBUM COR SERPL-MCNC: 9.4 MG/DL (ref 8.5–10.5)
CALCIUM SERPL-MCNC: 9.2 MG/DL (ref 8.5–10.5)
CANCER AG125 SERPL-ACNC: 307 U/ML (ref 0–35)
CHLORIDE SERPL-SCNC: 106 MMOL/L (ref 96–112)
CO2 SERPL-SCNC: 23 MMOL/L (ref 20–33)
COLOR UR: YELLOW
COMMENT 1642: NORMAL
CREAT SERPL-MCNC: 0.54 MG/DL (ref 0.5–1.4)
EOSINOPHIL # BLD AUTO: 0.03 K/UL (ref 0–0.51)
EOSINOPHIL NFR BLD: 0.4 % (ref 0–6.9)
ERYTHROCYTE [DISTWIDTH] IN BLOOD BY AUTOMATED COUNT: 75.8 FL (ref 35.9–50)
GFR SERPLBLD CREATININE-BSD FMLA CKD-EPI: 93 ML/MIN/1.73 M 2
GLOBULIN SER CALC-MCNC: 2.7 G/DL (ref 1.9–3.5)
GLUCOSE SERPL-MCNC: 86 MG/DL (ref 65–99)
GLUCOSE UR STRIP.AUTO-MCNC: NEGATIVE MG/DL
HCT VFR BLD AUTO: 39.4 % (ref 37–47)
HGB BLD-MCNC: 11.7 G/DL (ref 12–16)
IMM GRANULOCYTES # BLD AUTO: 0.01 K/UL (ref 0–0.11)
IMM GRANULOCYTES NFR BLD AUTO: 0.1 % (ref 0–0.9)
KETONES UR STRIP.AUTO-MCNC: NEGATIVE MG/DL
LEUKOCYTE ESTERASE UR QL STRIP.AUTO: NEGATIVE
LYMPHOCYTES # BLD AUTO: 1.2 K/UL (ref 1–4.8)
LYMPHOCYTES NFR BLD: 14.5 % (ref 22–41)
MCH RBC QN AUTO: 27 PG (ref 27–33)
MCHC RBC AUTO-ENTMCNC: 29.7 G/DL (ref 32.2–35.5)
MCV RBC AUTO: 91 FL (ref 81.4–97.8)
MICRO URNS: NORMAL
MICROCYTES BLD QL SMEAR: ABNORMAL
MONOCYTES # BLD AUTO: 0.34 K/UL (ref 0–0.85)
MONOCYTES NFR BLD AUTO: 4.1 % (ref 0–13.4)
MORPHOLOGY BLD-IMP: NORMAL
NEUTROPHILS # BLD AUTO: 6.68 K/UL (ref 1.82–7.42)
NEUTROPHILS NFR BLD: 80.4 % (ref 44–72)
NITRITE UR QL STRIP.AUTO: NEGATIVE
NRBC # BLD AUTO: 0 K/UL
NRBC BLD-RTO: 0 /100 WBC (ref 0–0.2)
PH UR STRIP.AUTO: 6.5 [PH] (ref 5–8)
PLATELET # BLD AUTO: 289 K/UL (ref 164–446)
PLATELET BLD QL SMEAR: NORMAL
PMV BLD AUTO: 10.3 FL (ref 9–12.9)
POTASSIUM SERPL-SCNC: 4.7 MMOL/L (ref 3.6–5.5)
PROT SERPL-MCNC: 6.5 G/DL (ref 6–8.2)
PROT UR QL STRIP: NEGATIVE MG/DL
RBC # BLD AUTO: 4.33 M/UL (ref 4.2–5.4)
RBC BLD AUTO: PRESENT
RBC UR QL AUTO: NEGATIVE
SODIUM SERPL-SCNC: 138 MMOL/L (ref 135–145)
SP GR UR STRIP.AUTO: 1.01
UROBILINOGEN UR STRIP.AUTO-MCNC: 0.2 EU/DL
WBC # BLD AUTO: 8.3 K/UL (ref 4.8–10.8)

## 2025-08-29 PROCEDURE — 85025 COMPLETE CBC W/AUTO DIFF WBC: CPT

## 2025-08-29 PROCEDURE — G0299 HHS/HOSPICE OF RN EA 15 MIN: HCPCS

## 2025-08-29 PROCEDURE — 86304 IMMUNOASSAY TUMOR CA 125: CPT

## 2025-08-29 PROCEDURE — 80053 COMPREHEN METABOLIC PANEL: CPT

## 2025-08-29 PROCEDURE — 81003 URINALYSIS AUTO W/O SCOPE: CPT

## 2025-08-29 SDOH — ECONOMIC STABILITY: HOUSING INSECURITY: HOME SAFETY: PT SAID SHE IS WAITING FOR OXYGEN COMPANY TO PICK UP OXYGEN ,CONCENTRATOR

## 2025-08-29 ASSESSMENT — ENCOUNTER SYMPTOMS
BOWEL PATTERN NORMAL: 1
LIMITED RANGE OF MOTION: 1
MUSCLE WEAKNESS: 1
DENIES PAIN: 1
LAST BOWEL MOVEMENT: 67445
FATIGUES EASILY: 1
FATIGUE: 1
STOOL FREQUENCY: LESS THAN DAILY
DRY SKIN: 1
HYPERTENSION: 1

## 2025-08-29 ASSESSMENT — PATIENT HEALTH QUESTIONNAIRE - PHQ9: CLINICAL INTERPRETATION OF PHQ2 SCORE: 0

## (undated) DEVICE — SODIUM CHL IRRIGATION 0.9% 1000ML (12EA/CA)

## (undated) DEVICE — CATHETER IV 20 GA X 1-1/4 ---SURG.& SDS ONLY--- (50EA/BX)

## (undated) DEVICE — SET LEADWIRE 5 LEAD BEDSIDE DISPOSABLE ECG (1SET OF 5/EA)

## (undated) DEVICE — WATER IRRIGATION STERILE 1000ML (12EA/CA)

## (undated) DEVICE — KIT  I.V. START (100EA/CA)

## (undated) DEVICE — GLOVE SZ 6 BIOGEL PI MICRO - PF LF (50PR/BX 4BX/CA)

## (undated) DEVICE — SUT NABSB 4-0 P-3 18IN PRLN - (12/BX)

## (undated) DEVICE — SUTURE 3-0 VICRYL PLUS SH - 27 INCH (36/BX)

## (undated) DEVICE — SLEEVE VASO DVT COMPRESSION CALF MED - (10PR/CA)

## (undated) DEVICE — SLEEVE, VASO, THIGH, MED

## (undated) DEVICE — GLOVE BIOGEL SZ 7 SURGICAL PF LTX - (50PR/BX 4BX/CA)

## (undated) DEVICE — COVER FOOT UNIVERSAL DISP. - (25EA/CA)

## (undated) DEVICE — TRAY SKIN SCRUB PVP WET (20EA/CA) PART #DYND70356 DISCONTINUED

## (undated) DEVICE — CLIP MED INTNL HRZN TI ESCP - (25/BX)

## (undated) DEVICE — KIT ANESTHESIA W/CIRCUIT & 3/LT BAG W/FILTER (20EA/CA)

## (undated) DEVICE — GLOVE BIOGEL INDICATOR SZ 7SURGICAL PF LTX - (50/BX 4BX/CA)

## (undated) DEVICE — BOVIE BLADE COATED &INSULATED (50EA/PK)

## (undated) DEVICE — DRAPE SURGICAL U 77X120 - (10/CA)

## (undated) DEVICE — DRAPESURG STERI-DRAPE LONG - (10/BX 4BX/CA)

## (undated) DEVICE — CANISTER SUCTION 3000ML MECHANICAL FILTER AUTO SHUTOFF MEDI-VAC NONSTERILE LF DISP (40EA/CA)

## (undated) DEVICE — SHEAR HS FOCUS 9CM CVD - (6/BX)

## (undated) DEVICE — SUTURE GENERAL

## (undated) DEVICE — DRAPE UNDER BUTTOCKS FLUID - (20/CA)

## (undated) DEVICE — TUBE EMG NIM TRIVANTAGE 7MM (3EA/PK)

## (undated) DEVICE — CLIP SM INTNL HRZN TI ESCP LGT - (24EA/PK 25PK/BX)

## (undated) DEVICE — GVL 3 STAT DISPOSABLE - (10/BX)

## (undated) DEVICE — ELECTRODE DUAL RETURN W/ CORD - (50/PK)

## (undated) DEVICE — SPONGE PEANUT - (5/PK 50PK/CA)

## (undated) DEVICE — SUTURE 3-0 MONOCRYL PLUS PS-1 - 27 INCH (36/BX)

## (undated) DEVICE — GOWN WARMING STANDARD FLEX - (30/CA)

## (undated) DEVICE — SYRINGE ASEPTO - (50EA/CA

## (undated) DEVICE — CANISTER SUCTION 3000ML MECHANICAL FILTER AUTO SHUTOFF MEDI-VAC NONSTERILE LF DISP  (40EA/CA)

## (undated) DEVICE — TUBE CONNECTING SUCTION - CLEAR PLASTIC STERILE 72 IN (50EA/CA)

## (undated) DEVICE — SUTURE 3-0 VICRYL PLUS RB-1 - 8 X 18 INCH (12/BX)

## (undated) DEVICE — PROTECTOR ULNA NERVE - (36PR/CA)

## (undated) DEVICE — Device

## (undated) DEVICE — PROBE PRASS STAND STIMULATING (5EA/PK)

## (undated) DEVICE — SUTURE 3-0 VICRYL PLUS SH - 8X 18 INCH (12/BX)

## (undated) DEVICE — CANISTER SUCTION RIGID RED 1500CC (40EA/CA)

## (undated) DEVICE — LEGGING LITHOTOMY 31 X 48 IN - (2EA/PK 20PK/CA)

## (undated) DEVICE — DRESSING NON-ADHERING 8 X 3 - (50/BX)

## (undated) DEVICE — LACTATED RINGERS INJ 1000 ML - (14EA/CA 60CA/PF)

## (undated) DEVICE — SENSOR OXIMETER ADULT SPO2 RD SET (20EA/BX)

## (undated) DEVICE — GOWN SURGEONS X-LARGE - DISP. (30/CA)

## (undated) DEVICE — CHLORAPREP 26 ML APPLICATOR - ORANGE TINT(25/CA)

## (undated) DEVICE — NEEDLE INSUFFLATION FOR STEP - (12/BX)

## (undated) DEVICE — GLOVE BIOGEL PI ORTHO SZ 7.5 PF LF (40PR/BX)

## (undated) DEVICE — PACK MINOR BASIN - (2EA/CA)

## (undated) DEVICE — HEAD HOLDER JUNIOR/ADULT

## (undated) DEVICE — MASK ANESTHESIA ADULT  - (100/CA)

## (undated) DEVICE — GLOVE COTTON STERILE (50/CA)

## (undated) DEVICE — DRAPE STRLE REG TOWEL 18X24 - (10/BX 4BX/CA)"

## (undated) DEVICE — SUTURE 2-0 SILK 12 X 18" (36PK/BX)"

## (undated) DEVICE — SET EXTENSION WITH 2 PORTS (48EA/CA) ***PART #2C8610 IS A SUBSTITUTE*****

## (undated) DEVICE — TUBING CLEARLINK DUO-VENT - C-FLO (48EA/CA)

## (undated) DEVICE — ARMREST CRADLE FOAM - (2PR/PK 12PR/CA)

## (undated) DEVICE — CANNULA W/SEAL 5X100 Z-THRE - ADED KII (12/BX)

## (undated) DEVICE — FIBRILLAR SURGICEL 4X4 - 10/CA

## (undated) DEVICE — TROCAR 5X100 BLADED Z-THREAD - KII (6/BX)

## (undated) DEVICE — STAPLER SKIN DISP - (6/BX 10BX/CA) VISISTAT

## (undated) DEVICE — SUCTION INSTRUMENT YANKAUER BULBOUS TIP W/O VENT (50EA/CA)

## (undated) DEVICE — SENSOR SPO2 NEO LNCS ADHESIVE (20/BX) SEE USER NOTES

## (undated) DEVICE — TRAY CATHETER FOLEY URINE METER W/STATLOCK 350ML (10EA/CA)

## (undated) DEVICE — SPONGE XRAY 8X4 STERL. 12PL - (10EA/TY 80TY/CA)

## (undated) DEVICE — DERMABOND ADVANCED - (12EA/BX)

## (undated) DEVICE — DRESSING TRANSPARENT FILM TEGADERM 2.375 X 2.75" (100EA/BX)"

## (undated) DEVICE — GOWN SURGEONS LARGE - (32/CA)

## (undated) DEVICE — SUTURE 0 VICRYL PLUS CT-2 - 27 INCH (36/BX)

## (undated) DEVICE — ELECTRODE 850 FOAM ADHESIVE - HYDROGEL RADIOTRNSPRNT (50/PK)

## (undated) DEVICE — DRAPE MAGNETIC (INSTRA-MAG) - (30/CA)

## (undated) DEVICE — TROCAR STEP 12MM - (3EA/CA)